# Patient Record
Sex: MALE | Race: WHITE | NOT HISPANIC OR LATINO | Employment: OTHER | URBAN - NONMETROPOLITAN AREA
[De-identification: names, ages, dates, MRNs, and addresses within clinical notes are randomized per-mention and may not be internally consistent; named-entity substitution may affect disease eponyms.]

---

## 2018-11-06 ENCOUNTER — TRANSCRIBE ORDERS (OUTPATIENT)
Dept: ADMINISTRATIVE | Facility: HOSPITAL | Age: 73
End: 2018-11-06

## 2018-11-06 DIAGNOSIS — R94.31 ABNORMAL EKG: Primary | ICD-10-CM

## 2018-11-07 ENCOUNTER — TRANSCRIBE ORDERS (OUTPATIENT)
Dept: ADMINISTRATIVE | Facility: HOSPITAL | Age: 73
End: 2018-11-07

## 2018-11-07 DIAGNOSIS — R94.31 ABNORMAL EKG: Primary | ICD-10-CM

## 2018-11-08 ENCOUNTER — HOSPITAL ENCOUNTER (OUTPATIENT)
Dept: CARDIOLOGY | Facility: HOSPITAL | Age: 73
Discharge: HOME | End: 2018-11-08
Admitting: NURSE PRACTITIONER

## 2018-11-08 ENCOUNTER — APPOINTMENT (OUTPATIENT)
Dept: CARDIOLOGY | Facility: HOSPITAL | Age: 73
End: 2018-11-08

## 2018-11-08 DIAGNOSIS — R94.31 ABNORMAL EKG: ICD-10-CM

## 2018-11-08 PROCEDURE — 93306 TTE W/DOPPLER COMPLETE: CPT | Performed by: INTERNAL MEDICINE

## 2018-11-08 PROCEDURE — 93306 TTE W/DOPPLER COMPLETE: CPT

## 2018-11-11 LAB
BH CV ECHO MEAS - % IVS THICK: 95.5 %
BH CV ECHO MEAS - % LVPW THICK: 95 %
BH CV ECHO MEAS - ACS: 2.6 CM
BH CV ECHO MEAS - AO ROOT AREA (BSA CORRECTED): 1.9
BH CV ECHO MEAS - AO ROOT AREA: 12.3 CM^2
BH CV ECHO MEAS - AO ROOT DIAM: 4 CM
BH CV ECHO MEAS - BSA(HAYCOCK): 2.1 M^2
BH CV ECHO MEAS - BSA: 2.1 M^2
BH CV ECHO MEAS - BZI_BMI: 29.5 KILOGRAMS/M^2
BH CV ECHO MEAS - BZI_METRIC_HEIGHT: 175.3 CM
BH CV ECHO MEAS - BZI_METRIC_WEIGHT: 90.7 KG
BH CV ECHO MEAS - EDV(CUBED): 171.6 ML
BH CV ECHO MEAS - EDV(MOD-SP4): 59 ML
BH CV ECHO MEAS - EDV(TEICH): 150.9 ML
BH CV ECHO MEAS - EF(CUBED): 82.2 %
BH CV ECHO MEAS - EF(MOD-SP4): 83.1 %
BH CV ECHO MEAS - EF(TEICH): 74.4 %
BH CV ECHO MEAS - ESV(CUBED): 30.5 ML
BH CV ECHO MEAS - ESV(MOD-SP4): 10 ML
BH CV ECHO MEAS - ESV(TEICH): 38.7 ML
BH CV ECHO MEAS - FS: 43.8 %
BH CV ECHO MEAS - IVS/LVPW: 1.1
BH CV ECHO MEAS - IVSD: 0.96 CM
BH CV ECHO MEAS - IVSS: 1.9 CM
BH CV ECHO MEAS - LA DIMENSION: 3.7 CM
BH CV ECHO MEAS - LA/AO: 0.94
BH CV ECHO MEAS - LV DIASTOLIC VOL/BSA (35-75): 28.6 ML/M^2
BH CV ECHO MEAS - LV MASS(C)D: 192.3 GRAMS
BH CV ECHO MEAS - LV MASS(C)DI: 93.1 GRAMS/M^2
BH CV ECHO MEAS - LV MASS(C)S: 223.8 GRAMS
BH CV ECHO MEAS - LV MASS(C)SI: 108.3 GRAMS/M^2
BH CV ECHO MEAS - LV SYSTOLIC VOL/BSA (12-30): 4.8 ML/M^2
BH CV ECHO MEAS - LVIDD: 5.6 CM
BH CV ECHO MEAS - LVIDS: 3.1 CM
BH CV ECHO MEAS - LVLD AP4: 8.3 CM
BH CV ECHO MEAS - LVLS AP4: 6.9 CM
BH CV ECHO MEAS - LVOT AREA (M): 3.5 CM^2
BH CV ECHO MEAS - LVOT AREA: 3.3 CM^2
BH CV ECHO MEAS - LVOT DIAM: 2.1 CM
BH CV ECHO MEAS - LVPWD: 0.87 CM
BH CV ECHO MEAS - LVPWS: 1.7 CM
BH CV ECHO MEAS - MV A MAX VEL: 79 CM/SEC
BH CV ECHO MEAS - MV E MAX VEL: 56.3 CM/SEC
BH CV ECHO MEAS - MV E/A: 0.71
BH CV ECHO MEAS - RVDD: 1.6 CM
BH CV ECHO MEAS - SI(CUBED): 68.3 ML/M^2
BH CV ECHO MEAS - SI(MOD-SP4): 23.7 ML/M^2
BH CV ECHO MEAS - SI(TEICH): 54.3 ML/M^2
BH CV ECHO MEAS - SV(CUBED): 141 ML
BH CV ECHO MEAS - SV(MOD-SP4): 49 ML
BH CV ECHO MEAS - SV(TEICH): 112.3 ML
MAXIMAL PREDICTED HEART RATE: 146 BPM
STRESS TARGET HR: 124 BPM

## 2021-11-09 ENCOUNTER — TELEPHONE (OUTPATIENT)
Dept: UROLOGY | Facility: CLINIC | Age: 76
End: 2021-11-09

## 2021-11-18 ENCOUNTER — OFFICE VISIT (OUTPATIENT)
Dept: UROLOGY | Facility: CLINIC | Age: 76
End: 2021-11-18

## 2021-11-18 VITALS — BODY MASS INDEX: 29.1 KG/M2 | HEIGHT: 68 IN | WEIGHT: 192 LBS

## 2021-11-18 DIAGNOSIS — N40.1 BENIGN PROSTATIC HYPERPLASIA WITH WEAK URINARY STREAM: ICD-10-CM

## 2021-11-18 DIAGNOSIS — N40.0 BPH WITHOUT OBSTRUCTION/LOWER URINARY TRACT SYMPTOMS: ICD-10-CM

## 2021-11-18 DIAGNOSIS — N40.0 BPH WITH ELEVATED PSA: Primary | ICD-10-CM

## 2021-11-18 DIAGNOSIS — R39.12 BENIGN PROSTATIC HYPERPLASIA WITH WEAK URINARY STREAM: ICD-10-CM

## 2021-11-18 DIAGNOSIS — R97.20 BPH WITH ELEVATED PSA: Primary | ICD-10-CM

## 2021-11-18 LAB
BILIRUB BLD-MCNC: NEGATIVE MG/DL
CLARITY, POC: CLEAR
COLOR UR: YELLOW
EXPIRATION DATE: NORMAL
GLUCOSE UR STRIP-MCNC: NEGATIVE MG/DL
KETONES UR QL: NEGATIVE
LEUKOCYTE EST, POC: NEGATIVE
Lab: NORMAL
NITRITE UR-MCNC: NEGATIVE MG/ML
PH UR: 6 [PH] (ref 5–8)
PROT UR STRIP-MCNC: NEGATIVE MG/DL
RBC # UR STRIP: NEGATIVE /UL
SP GR UR: 1.02 (ref 1–1.03)
UROBILINOGEN UR QL: NORMAL

## 2021-11-18 PROCEDURE — 99204 OFFICE O/P NEW MOD 45 MIN: CPT | Performed by: NURSE PRACTITIONER

## 2021-11-18 PROCEDURE — 36415 COLL VENOUS BLD VENIPUNCTURE: CPT | Performed by: NURSE PRACTITIONER

## 2021-11-18 PROCEDURE — 81003 URINALYSIS AUTO W/O SCOPE: CPT | Performed by: NURSE PRACTITIONER

## 2021-11-18 PROCEDURE — 51798 US URINE CAPACITY MEASURE: CPT | Performed by: NURSE PRACTITIONER

## 2021-11-18 RX ORDER — PRAVASTATIN SODIUM 20 MG
1 TABLET ORAL DAILY
COMMUNITY
Start: 2021-10-13

## 2021-11-18 RX ORDER — LOSARTAN POTASSIUM 100 MG/1
1 TABLET ORAL DAILY
COMMUNITY
Start: 2021-10-13

## 2021-11-18 RX ORDER — DILTIAZEM HYDROCHLORIDE 180 MG/1
1 CAPSULE, COATED, EXTENDED RELEASE ORAL DAILY
COMMUNITY
Start: 2021-10-13

## 2021-11-18 NOTE — PATIENT INSTRUCTIONS
Prostate Cancer Screening    Prostate cancer screening is a test that is done to check for the presence of prostate cancer in men. The prostate gland is a walnut-sized gland that is located below the bladder and in front of the rectum in males. The function of the prostate is to add fluid to semen during ejaculation. Prostate cancer is the second most common type of cancer in men.  Who should have prostate cancer screening?   Screening recommendations vary based on age and other risk factors.  Screening is recommended if:  · You are older than age 55. If you are age 55-69, talk with your health care provider about your need for screening and how often screening should be done. Because most prostate cancers are slow growing and will not cause death, screening is generally reserved in this age group for men who have a 10-15-year life expectancy.  · You are younger than age 55, and you have these risk factors:  ? Being a black male or a male of  descent.  ? Having a father, brother, or uncle who has been diagnosed with prostate cancer. The risk is higher if your family member's cancer occurred at an early age.  Screening is not recommended if:  · You are younger than age 40.  · You are between the ages of 40 and 54 and you have no risk factors.  · You are 70 years of age or older. At this age, the risks that screening can cause are greater than the benefits that it may provide.  If you are at high risk for prostate cancer, your health care provider may recommend that you have screenings more often or that you start screening at a younger age.  How is screening for prostate cancer done?  The recommended prostate cancer screening test is a blood test called the prostate-specific antigen (PSA) test. PSA is a protein that is made in the prostate. As you age, your prostate naturally produces more PSA. Abnormally high PSA levels may be caused by:  · Prostate cancer.  · An enlarged prostate that is not caused by cancer  (benign prostatic hyperplasia, BPH). This condition is very common in older men.  · A prostate gland infection (prostatitis).  Depending on the PSA results, you may need more tests, such as:  · A physical exam to check the size of your prostate gland.  · Blood and imaging tests.  · A procedure to remove tissue samples from your prostate gland for testing (biopsy).  What are the benefits of prostate cancer screening?  · Screening can help to identify cancer at an early stage, before symptoms start and when the cancer can be treated more easily.  · There is a small chance that screening may lower your risk of dying from prostate cancer. The chance is small because prostate cancer is a slow-growing cancer, and most men with prostate cancer die from a different cause.  What are the risks of prostate cancer screening?  The main risk of prostate cancer screening is diagnosing and treating prostate cancer that would never have caused any symptoms or problems. This is called overdiagnosisand overtreatment. PSA screening cannot tell you if your PSA is high due to cancer or a different cause. A prostate biopsy is the only procedure to diagnose prostate cancer. Even the results of a biopsy may not tell you if your cancer needs to be treated. Slow-growing prostate cancer may not need any treatment other than monitoring, so diagnosing and treating it may cause unnecessary stress or other side effects.  A prostate biopsy may also cause:  · Infection or fever.  · A false negative. This is a result that shows that you do not have prostate cancer when you actually do have prostate cancer.  Questions to ask your health care provider  · When should I start prostate cancer screening?  · What is my risk for prostate cancer?  · How often do I need screening?  · What type of screening tests do I need?  · How do I get my test results?  · What do my results mean?  · Do I need treatment?  Where to find more information  · The American Cancer  Society: www.cancer.org  · American Urological Association: www.auanet.org  Contact a health care provider if:  · You have difficulty urinating.  · You have pain when you urinate or ejaculate.  · You have blood in your urine or semen.  · You have pain in your back or in the area of your prostate.  Summary  · Prostate cancer is a common type of cancer in men. The prostate gland is located below the bladder and in front of the rectum. This gland adds fluid to semen during ejaculation.  · Prostate cancer screening may identify cancer at an early stage, when the cancer can be treated more easily.  · The prostate-specific antigen (PSA) test is the recommended screening test for prostate cancer.  · Discuss the risks and benefits of prostate cancer screening with your health care provider. If you are age 70 or older, the risks that screening can cause are greater than the benefits that it may provide.  This information is not intended to replace advice given to you by your health care provider. Make sure you discuss any questions you have with your health care provider.  Document Revised: 04/09/2021 Document Reviewed: 07/30/2020  [employer] Patient Education © 2021 [employer]  Prostate Cancer    The prostate is a male gland that helps make semen. It is located below a man's bladder, in front of the rectum. Prostate cancer is when abnormal cells grow in this gland.  What are the causes?  The cause of this condition is not known.  What increases the risk?  You are more likely to develop this condition if:  · You are 65 years of age or older.  · You are .  · You have a family history of prostate cancer.  · You have a family history of breast cancer.  What are the signs or symptoms?  Symptoms of this condition include:  · A need to pee often.  · Peeing that is weak, or pee that stops and starts.  · Trouble starting or stopping your pee.  · Inability to pee.  · Blood in your pee or semen.  · Pain in the lower  back, lower belly (abdomen), hips, or upper thighs.  · Trouble getting an erection.  · Trouble emptying all of your pee.  How is this treated?  Treatment for this condition depends on your age, your health, the kind of treatment you like, and how far the cancer has spread. Treatments include:  · Being watched. This is called observation. You will be tested from time to time, but you will not get treated. Tests are to make sure that the cancer is not growing.  · Surgery. This may be done to remove the prostate, to remove the testicles, or to freeze or kill cancer cells.  · Radiation. This uses a strong beam to kill cancer cells.  · Ultrasound energy. This uses strong sound waves to kill cancer cells.  · Chemotherapy. This uses medicines that stop cancer cells from increasing. This kills cancer cells and healthy cells.  · Targeted therapy. This kills cancer cells only. Healthy cells are not affected.  · Hormone treatment. This stops the body from making hormones that help the cancer cells to grow.  Follow these instructions at home:  · Take over-the-counter and prescription medicines only as told by your doctor.  · Eat a healthy diet.  · Get plenty of sleep.  · Ask your doctor for help to find a support group for men with prostate cancer.  · If you have to go to the hospital, let your cancer doctor (oncologist) know.  · Treatment may affect your ability to have sex. Touch, hold, hug, and caress your partner to have intimate moments.  · Keep all follow-up visits as told by your doctor. This is important.  Contact a doctor if:  · You have new or more trouble peeing.  · You have new or more blood in your pee.  · You have new or more pain in your hips, back, or chest.  Get help right away if:  · You have weakness in your legs.  · You lose feeling in your legs.  · You cannot control your pee or your poop (stool).  · You have chills or a fever.  Summary  · The prostate is a male gland that helps make semen.  · Prostate  cancer is when abnormal cells grow in this gland.  · Treatment includes doing surgery, using medicines, using very strong beams, or watching without treatment.  · Ask your doctor for help to find a support group for men with prostate cancer.  · Contact a doctor if you have problems peeing or have any new pain that you did not have before.  This information is not intended to replace advice given to you by your health care provider. Make sure you discuss any questions you have with your health care provider.  Document Revised: 12/01/2020 Document Reviewed: 12/01/2020  [employer] Patient Education © 2021 [employer]

## 2021-11-18 NOTE — PROGRESS NOTES
"Chief Complaint  Elevated PSA (New Patient )    Subjective          Crow Zeng presents to [facility]  History of Present Illness    Mr. Crow Zeng is a very pleasant 76year-old male who presents  to clinic today for evaluation. He has been referred to us by his PCP for an Elevated PSA of 5.1. He is unsure of his PSA prior to this, but reports an increasing trend over the last few years. He has a significant family history of Prostate Cancer-His Brother, diagnosed over 20 years ago- still alive in his 80s     He is glad to be doing better at this age, Patient denies any issues with BPH recently. Besides occasional Nocturia 1-2 times, a weak urinary stream, He denies  having any urinary symptoms of frequency, urgency or dysuria, He does not have Recurrent UTIs, he denies any burning on urination, Urinary hesitancy or post void dribbling. He apparently is on No medication for his prostate.    He is also here for his yearly prostate exam, states his hast PACO was over 30 years ago and was unremarkable. His DREtoday is negative for nodules, He has good rectal tone, AN Enlarged, smooth BUT VERY FIRMHARD AND SUSPICIOUS prostate. There is no nodularity or any suspicious rectal abnormalities.  Denies pelvic pressure or supra pubic discomfort. Denies abdominal, back or flank pain. No N/V/D. Denies chills or fevers. His urine dipstick today is completely negative for any infection, it is negative for gross/microscopic hematuria. His IPSS score is 6, his PVR>24CC    Objective   Vital Signs:   Ht 172.7 cm (68\")   Wt 87.1 kg (192 lb)   BMI 29.19 kg/m²     Physical Exam  Constitutional:       General: He is in acute distress.      Appearance: He is well-developed. He is obese.   HENT:      Head: Normocephalic and atraumatic.      Right Ear: External ear normal.      Left Ear: External ear normal.   Eyes:      General:         Right eye: No discharge.         Left eye: No " discharge.      Conjunctiva/sclera: Conjunctivae normal.      Pupils: Pupils are equal, round, and reactive to light.   Neck:      Thyroid: No thyromegaly.      Trachea: No tracheal deviation.   Cardiovascular:      Rate and Rhythm: Normal rate and regular rhythm.      Heart sounds: No murmur heard.  No friction rub.   Pulmonary:      Effort: Pulmonary effort is normal. No respiratory distress.      Breath sounds: Normal breath sounds. No stridor.   Abdominal:      General: Bowel sounds are normal. There is distension.      Palpations: Abdomen is soft.      Tenderness: There is abdominal tenderness. There is no guarding.   Genitourinary:     Penis: Normal and uncircumcised. No tenderness or discharge.       Testes: Normal.      Rectum: Normal. Guaiac result negative.      Comments: His PACO is negative for nodules, He has good rectal tone, AN Enlarged,  smooth BUT VERY firm/HARD AND SUSPICIOUS prostate. There is no nodularity or any suspicious rectal abnormalities.    Musculoskeletal:         General: Tenderness present. No deformity. Normal range of motion.      Cervical back: Normal range of motion and neck supple.      Comments: ESSENTIAL TREMMORS     Skin:     General: Skin is warm and dry.      Capillary Refill: Capillary refill takes less than 2 seconds.      Coloration: Skin is not pale.   Neurological:      Mental Status: He is alert and oriented to person, place, and time.      Cranial Nerves: No cranial nerve deficit.      Coordination: Coordination normal.   Psychiatric:         Behavior: Behavior normal.         Thought Content: Thought content normal.         Judgment: Judgment normal.     IPSS Questionnaire (AUA-7):  Over the past month…    1)  How often have you had a sensation of not emptying your bladder completely after you finish urinating?  0 - Not at all   2)  How often have you had to urinate again less than two hours after you finished urinating? 1 - Less than 1 time in 5   3)  How often have  you found you stopped and started again several times when you urinated?  0 - Not at all   4) How difficult have you found it to postpone urination?  0 - Not at all   5) How often have you had a weak urinary stream?  3 - About half the time   6) How often have you had to push or strain to begin urination?  0 - Not at all   7) How many times did you most typically get up to urinate from the time you went to bed until the time you got up in the morning?  2 - 2 times   Total score:  0-7 mildly symptomatic                                                          6    8-19 moderately symptomatic    20-35 severely symptomatic        Result Review :e Hesitancy or Post void Dribbling.           UA    Urinalysis 11/18/21   Ketones, UA Negative   Leukocytes, UA Negative               PSA    PSA 11/18/21   PSA 3.7      Comments are available for some flowsheets but are not being displayed.                     Assessment and Plan    Diagnoses and all orders for this visit:    1. BPH with elevated PSA (Primary)  -     PSA, Total & Free  -     POC Urinalysis Dipstick, Automated    2. BPH without obstruction/lower urinary tract symptoms  -     PSA, Total & Free    Other orders  -     Bladder Scan    BPH: WE Discussed the pathophysiology of BPH and obstruction. We discussed the static and dynamic effects of BPH as well as using 5 alpha reductase inhibitors versus alpha blockade.  We discussed the indications for transurethral surgery as well.  And/ or other therapeutic options available including all of the newer techniques. He has  real symptomatology referable to his prostate other than moderate enlargement.  Recommending we proceed with extensive workup if his repeat PSA/free and total percentages become concerning(FREE PSA<10).  I am also recommending an alpha blocker tamsulosin 0.4 mg capsule daily FOR his weak urinary strength.      Elevated PSA :Pt has been referred to us with a PSA of 5.1, He has a fairly enlarged  enlarged/firm/hard prostate with only minor urinary symptoms of nocturia 3-4 times depending on his fluid intake or how late he consumes any beverage prior to bedtime.He has a significant family history of Prostate Cancer-His Brother, diagnosed over 20 years ago- still alive    We discussed the diagnosis of Elevated Prostate-Specific antigen (PSA).  I explained the pathophysiology of PSA.  It is a serine protease. It's  function in the male reproductive tract is to facilitate the liquefaction of semen.  It is for this reason the body does not want it freely floating in the serum and why it is typically bound tightly to albumin.     We discussed why we most typically will use both a PSA free and Total to determine the need for more aggressive therapy. I discussed the normal range, and how it varies with age groups and descent.  Additionally, I explained to him typically PSA was in the range of 1-4 but more recently has been downgraded to something less than 2 or even approaching 1.      I discussed the risk of family history particularly the fact that the average male has a 14% risk of prostate cancer and that in the face of a positive diagnosis in a father it will tablet and any other first-generation relative continued tablet insofar that a father and brother with prostate cancer will produce almost a 50% risk of prostate cancer.  I discussed the use of the temporal use of PSA as the best option for monitoring.  We also discussed the fact that an elevated PSA is an isolated event does not mean that this is prostate cancer and should not engender worry in this regard.     FINALLY, I also discussed other things that can elevate PSA including constipation, prostatitis, infection, recent intercourse etc, as well as the risks and benefits associated with this.  Also discussed the fact that this is a dilutional test and as a consequence of such, will occasionally produce slight variations on a single specimen.                                              PLAN  Further discussed the risks and benefits of a prostate biopsy as well if indicated.      His PSA was repeated today: Free and total PSA pending to complete the work-up    I will call patient with results and definitive plan of care.    DiscussedSTARTING Flomax 0.4 mg as prescribed for BPH.    Patient/wife agreeable plan of care.      Follow Up   Return in about 6 months (around 5/18/2022) for Next scheduled follow up repeat PSA, Next scheduled follow up.  Patient was given instructions and counseling regarding his condition or for health maintenance advice. Please see specific information pulled into the AVS if appropriate.

## 2021-11-18 NOTE — PROGRESS NOTES
"Chief Complaint  Elevated PSA (New Patient )    Subjective          Crow Zeng presents to [facility]  History of Present Illness    Objective   Vital Signs:   Ht 172.7 cm (68\")   Wt 87.1 kg (192 lb)   BMI 29.19 kg/m²     Physical Exam   Result Review :                 Assessment and Plan    Diagnoses and all orders for this visit:    1. BPH with elevated PSA (Primary)  -     PSA, Total & Free    2. BPH without obstruction/lower urinary tract symptoms  -     PSA, Total & Free        Follow Up   No follow-ups on file.  Patient was given instructions and counseling regarding his condition or for health maintenance advice. Please see specific information pulled into the AVS if appropriate.       "

## 2021-11-19 ENCOUNTER — TELEPHONE (OUTPATIENT)
Dept: UROLOGY | Facility: CLINIC | Age: 76
End: 2021-11-19

## 2021-11-19 LAB
PSA FREE MFR SERPL: 22.2 %
PSA FREE SERPL-MCNC: 0.82 NG/ML
PSA SERPL-MCNC: 3.7 NG/ML (ref 0–4)

## 2021-11-30 RX ORDER — TAMSULOSIN HYDROCHLORIDE 0.4 MG/1
1 CAPSULE ORAL DAILY
Qty: 30 CAPSULE | Refills: 5 | Status: SHIPPED | OUTPATIENT
Start: 2021-11-30

## 2022-05-18 ENCOUNTER — OFFICE VISIT (OUTPATIENT)
Dept: UROLOGY | Facility: CLINIC | Age: 77
End: 2022-05-18

## 2022-05-18 VITALS — BODY MASS INDEX: 29.1 KG/M2 | WEIGHT: 192 LBS | HEIGHT: 68 IN

## 2022-05-18 DIAGNOSIS — R33.9 INCOMPLETE EMPTYING OF BLADDER: ICD-10-CM

## 2022-05-18 DIAGNOSIS — N40.1 BENIGN PROSTATIC HYPERPLASIA WITH WEAK URINARY STREAM: ICD-10-CM

## 2022-05-18 DIAGNOSIS — R39.12 BENIGN PROSTATIC HYPERPLASIA WITH WEAK URINARY STREAM: ICD-10-CM

## 2022-05-18 DIAGNOSIS — R97.20 BPH WITH ELEVATED PSA: Primary | ICD-10-CM

## 2022-05-18 DIAGNOSIS — N40.0 BPH WITH ELEVATED PSA: Primary | ICD-10-CM

## 2022-05-18 DIAGNOSIS — N40.0 BPH WITHOUT OBSTRUCTION/LOWER URINARY TRACT SYMPTOMS: ICD-10-CM

## 2022-05-18 PROCEDURE — 84153 ASSAY OF PSA TOTAL: CPT | Performed by: NURSE PRACTITIONER

## 2022-05-18 PROCEDURE — 84154 ASSAY OF PSA FREE: CPT | Performed by: NURSE PRACTITIONER

## 2022-05-18 PROCEDURE — 99214 OFFICE O/P EST MOD 30 MIN: CPT | Performed by: NURSE PRACTITIONER

## 2022-05-18 NOTE — PROGRESS NOTES
"Chief Complaint  Benign Prostatic Hypertrophy (6 MONTH FOLLOW UP FOR BPH/WEAK URINE STREAM/RECHECK PSA)    Subjective          Crow Zeng presents to [facility] for   History of Present Illness     Mr. Crow Zeng is a very pleasant 76-year-old male who returns to clinic today 05/18/2020, for his 6 months follow-up on elevated PSA.  Patient's most  recent PSA IS 3.7, with a free PSA of 22.2% done ON 11/18/2021.  Patient is here for PSA recheck.  He is in no apparent discomfort and reports doing relatively well.    He HAD been referred to us by his PCP for an Elevated PSA of 5.1. He was unsure of his PSA prior to this, but reports an increasing trend over the last few years. He has a significant family history of Prostate Cancer-His Brother, diagnosed over 20 years ago- still alive in his 80s     TODAY, He is glad to be doing better at this age, Patient denies any issues with BPH recently. Besides occasional Nocturia 1-2 times, a weak urinary stream, He denies  having any urinary symptoms of frequency, urgency or dysuria, He does not have Recurrent UTIs, he denies any burning on urination, Urinary hesitancy or post void dribbling. He apparently is on No medication for his prostate.     The patient was started on Flomax 6 months ago but reports he has been unable to tolerate medications due to dizzy spells.  He Denies pelvic pressure or supra pubic discomfort. Denies abdominal, back or flank pain. No N/V/D. Denies chills or fevers. His urine dipstick today is completely negative for any infection, it is negative for gross/microscopic hematuria. His IPSS score is 5, his PVR> 64CC     Objective   Vital Signs:   Ht 172.7 cm (67.99\")   Wt 87.1 kg (192 lb)   BMI 29.20 kg/m²     Physical Exam  Constitutional:       General: He is in acute distress.      Appearance: He is well-developed. He is obese.   HENT:      Head: Normocephalic and atraumatic.      Right Ear: External " ear normal.      Left Ear: External ear normal.   Eyes:      General:         Right eye: No discharge.         Left eye: No discharge.      Conjunctiva/sclera: Conjunctivae normal.      Pupils: Pupils are equal, round, and reactive to light.   Neck:      Thyroid: No thyromegaly.      Trachea: No tracheal deviation.   Cardiovascular:      Rate and Rhythm: Normal rate and regular rhythm.      Heart sounds: No murmur heard.    No friction rub.   Pulmonary:      Effort: Pulmonary effort is normal. No respiratory distress.      Breath sounds: Normal breath sounds. No stridor.   Abdominal:      General: Bowel sounds are normal. There is distension.      Palpations: Abdomen is soft.      Tenderness: There is abdominal tenderness. There is no guarding.   Genitourinary:     Penis: Normal and uncircumcised. No tenderness or discharge.       Testes: Normal.      Rectum: Normal. Guaiac result negative.   Musculoskeletal:         General: Tenderness present. No deformity. Normal range of motion.      Cervical back: Normal range of motion and neck supple.   Skin:     General: Skin is warm and dry.      Capillary Refill: Capillary refill takes less than 2 seconds.      Coloration: Skin is pale.   Neurological:      General: No focal deficit present.      Mental Status: He is alert and oriented to person, place, and time.      Cranial Nerves: No cranial nerve deficit.      Motor: Weakness present.      Coordination: Coordination normal.   Psychiatric:         Behavior: Behavior normal.         Thought Content: Thought content normal.         Judgment: Judgment normal.      IPSS Questionnaire (AUA-7):  Over the past month…    1)  How often have you had a sensation of not emptying your bladder completely after you finish urinating?  1 - Less than 1 time in 5   2)  How often have you had to urinate again less than two hours after you finished urinating? 1 - Less than 1 time in 5   3)  How often have you found you stopped and started  again several times when you urinated?  1 - Less than 1 time in 5   4) How difficult have you found it to postpone urination?  0 - Not at all   5) How often have you had a weak urinary stream?  2 - Less than half the time   6) How often have you had to push or strain to begin urination?  0 - Not at all   7) How many times did you most typically get up to urinate from the time you went to bed until the time you got up in the morning?  0 - None   Total score:  0-7 mildly symptomatic                                                    5    8-19 moderately symptomatic    20-35 severely symptomatic       Result Review :     UA    Urinalysis 11/18/21   Ketones, UA Negative   Leukocytes, UA Negative               PSA    PSA 11/18/21   PSA 3.7      Comments are available for some flowsheets but are not being displayed.                     Assessment and Plan    Problem List Items Addressed This Visit    None     Visit Diagnoses     BPH with elevated PSA    -  Primary    Relevant Orders    PSA Total+% Free (Serial)    Benign prostatic hyperplasia with weak urinary stream        Relevant Orders    PSA Total+% Free (Serial)    BPH without obstruction/lower urinary tract symptoms        Relevant Orders    PSA Total+% Free (Serial)    Incomplete emptying of bladder        Relevant Orders    Bladder Scan (Completed)           ASSESSMMENT       BPH WITH ELEVATED PSA/WEAK URINARY STREAM/LUTS  Mr. Crow Zeng is a very pleasant 76-year-old male who returns to clinic today 05/18/2020, for his 6 months follow-up on elevated PSA.  Patient's most  recent PSA IS 3.7, with a free PSA of 22.2% done ON 11/18/2021.  Patient is here for PSA recheck.  He is in no apparent discomfort and reports doing relatively well.  His PSA on initial evaluation was 5.1, his urine dipstick is negative, his IPSS score is 5    BPH: Again, WE Discussed the pathophysiology of BPH and obstruction.   We discussed the static and dynamic effects of BPH as well as  using 5 alpha reductase inhibitors versus alpha blockade.  We discussed the indications for transurethral surgery as well.  And/ or other therapeutic options available including all of the newer techniques. He has NO  real symptomatology referable to his prostate other than moderate enlargement.    Rather than proceed  with any extensive workup at this time, I am recommending we monitor his PSAs as indicated.  Reports he is unable to tolerate Flomax.    Elevated PSA :Pt HAD been referred to us with a PSA of 5.1, his repeat PSA was 3.7.  He has a fairly enlarged enlarged/firm/hard prostate with only minor urinary symptoms of nocturia depending on his fluid intake or how late he consumes any beverage prior to bedtime.He has a significant family history of Prostate Cancer-His Brother, diagnosed over 20 years ago- still alive.      We discussed the diagnosis of Elevated Prostate-Specific antigen (PSA).  I explained the pathophysiology of PSA.  It is a serine protease. It's  function in the male reproductive tract is to facilitate the liquefaction of semen.  It is for this reason the body does not want it freely floating in the serum and why it is typically bound tightly to albumin.      Again, we discussed why we most typically will use both a PSA free and Total to determine the need for more aggressive therapy. I discussed the normal range, and how it varies with age groups and descent.  Additionally, I explained to him typically PSA was in the range of 1-4 but more recently has been downgraded to something less than 2 or even approaching 1.       I discussed the risk of family history particularly the fact that the average male has a 14% risk of prostate cancer and that in the face of a positive diagnosis in a father it will tablet and any other first-generation relative continued tablet insofar that a father and brother with prostate cancer will produce almost a 50% risk of prostate cancer.  I discussed the use of  the temporal use of PSA as the best option for monitoring.  We also discussed the fact that an elevated PSA is an isolated event does not mean that this is prostate cancer and should not engender worry in this regard.      FINALLY, I also discussed other things that can elevate PSA including constipation, prostatitis, infection, recent intercourse etc, as well as the risks and benefits associated with this.  Also discussed the fact that this is a dilutional test and as a consequence of such, will occasionally produce slight variations on a single specimen.                                              PLAN  Further discussed the risks and benefits of a prostate biopsy as well if indicated, LAST PSA RECHECK WAS 3.7 WITH 22.2 %FREE PSA     His PSA was repeated today: Free and total PSA p    I will call patient with results and definitive plan of care.     Discussed STOPPING Flomax 0.4 mg as prescribed for BPH(patient reports unable to tolerate medication due to dizziness on numerous occasions.  He tried multiple times and failed)    He will follow-up in 1 year, may return sooner if need be     Patient  agreeable plan of care.     Patient reports that he is not currently experiencing any symptoms of urinary incontinence.    BMI is >= 25 and < 30. (Overweight) The following options were offered after discussion: weight loss educational material (shared in after visit summary), exercise counseling/recommendations and nutrition counseling/recommendations    Smoking Cessation Counseling:  Never a smoker.  Patient does not currently use any tobacco products.     Follow Up   Return in about 1 year (around 5/18/2023) for BPH WITH WEAK URINARY STREAM/CHECK PSA/PVR.  Patient was given instructions and counseling regarding his condition or for health maintenance advice. Please see specific information pulled into the AVS if appropriate.

## 2022-05-19 LAB
PSA FREE MFR SERPL: 17.1 %
PSA FREE SERPL-MCNC: 0.84 NG/ML
PSA SERPL-MCNC: 4.9 NG/ML (ref 0–4)

## 2022-05-23 ENCOUNTER — TELEPHONE (OUTPATIENT)
Dept: UROLOGY | Facility: CLINIC | Age: 77
End: 2022-05-23

## 2022-05-23 NOTE — TELEPHONE ENCOUNTER
I called the pt for Vicente to let him know his PSA was slightly elevated at 4.3 compared to last level of 3.7. I told him we would monitor it and make sure it is ok. Left a voicemail with all this because no answer.

## 2023-01-18 ENCOUNTER — OFFICE VISIT (OUTPATIENT)
Dept: UROLOGY | Facility: CLINIC | Age: 78
End: 2023-01-18
Payer: MEDICARE

## 2023-01-18 VITALS
BODY MASS INDEX: 29.55 KG/M2 | HEIGHT: 68 IN | HEART RATE: 67 BPM | DIASTOLIC BLOOD PRESSURE: 110 MMHG | SYSTOLIC BLOOD PRESSURE: 183 MMHG | WEIGHT: 195 LBS

## 2023-01-18 DIAGNOSIS — N40.0 BPH WITHOUT OBSTRUCTION/LOWER URINARY TRACT SYMPTOMS: ICD-10-CM

## 2023-01-18 DIAGNOSIS — R97.20 BPH WITH ELEVATED PSA: Primary | ICD-10-CM

## 2023-01-18 DIAGNOSIS — N40.0 BPH WITH ELEVATED PSA: Primary | ICD-10-CM

## 2023-01-18 DIAGNOSIS — Z80.42 FAMILY HISTORY OF PROSTATE CANCER: ICD-10-CM

## 2023-01-18 LAB
BILIRUB BLD-MCNC: NEGATIVE MG/DL
CLARITY, POC: CLEAR
COLOR UR: YELLOW
EXPIRATION DATE: NORMAL
GLUCOSE UR STRIP-MCNC: NEGATIVE MG/DL
KETONES UR QL: NEGATIVE
LEUKOCYTE EST, POC: NEGATIVE
Lab: NORMAL
NITRITE UR-MCNC: NEGATIVE MG/ML
PH UR: 6.5 [PH] (ref 5–8)
PROT UR STRIP-MCNC: NEGATIVE MG/DL
RBC # UR STRIP: NEGATIVE /UL
SP GR UR: 1.01 (ref 1–1.03)
UROBILINOGEN UR QL: NORMAL

## 2023-01-18 PROCEDURE — 51798 US URINE CAPACITY MEASURE: CPT | Performed by: NURSE PRACTITIONER

## 2023-01-18 PROCEDURE — 81003 URINALYSIS AUTO W/O SCOPE: CPT | Performed by: NURSE PRACTITIONER

## 2023-01-18 PROCEDURE — 99214 OFFICE O/P EST MOD 30 MIN: CPT | Performed by: NURSE PRACTITIONER

## 2023-01-18 RX ORDER — PREDNISONE 2.5 MG
TABLET ORAL
COMMUNITY
Start: 2023-01-08

## 2023-01-18 NOTE — PROGRESS NOTES
Chief Complaint  Benign Prostatic Hypertrophy (6 MONTH Follow up ON BPH WITH ELEVATED PSA) and Elevated PSA    Subjective          Crow Zeng presents to [facility] for BPH WITH ELEVATED PSA  History of Present Illness     Mr. Crow Zeng is a very pleasant 77-year-old male who returns to clinic today 01/19/22 for evaluation.  This is his his 6 months follow-up on elevated PSA.  Patient's most  recent PSA IS 4.9, done 8 months ago on 05/18/2022.  His PSA prior was 3.7, with a free PSA of 22.2% done ON 11/18/2021. Patient is here for PSA recheck. He is in no apparent discomfort and reports doing relatively well.     He HAD  initially been referred to us by his PCP for an Elevated PSA of 5.1. He was unsure of his PSA prior to this, but reports an increasing trend over the last few years. He has a significant family history of Prostate Cancer-His Brother, diagnosed over 20 years ago- still alive in his 80s     TODAY, He is glad to be doing better at this age, Patient denies any issues with BPH recently. Besides occasional Nocturia 1-2 times, a weak urinary stream, He denies  having any urinary symptoms of frequency, urgency or dysuria, He does not have Recurrent UTIs, he denies any burning on urination, Urinary hesitancy or post void dribbling. He apparently is on No medication for his prostate.     The patient was started on Flomax 6 months ago but reports he has been unable to tolerate medications due to dizzy spells.  He Denies pelvic pressure or supra pubic discomfort. Denies abdominal, back or flank pain. No N/V/D. Denies chills or fevers. His urine dipstick today is completely negative for any infection, it is negative for gross/microscopic hematuria. His IPSS score is 10, his PVR> 32    We also discussed his systolic blood pressure 183 and 108 today patient will continue blood pressure monitoring and follow-up with PCP    We also discussed getting a prostate MRI  "due to his rising PSA    Patient is agreeable plan of care     Objective   Vital Signs:   BP (!) 183/110 (BP Location: Right arm, Patient Position: Sitting, Cuff Size: Adult)   Pulse 67   Ht 172.7 cm (68\")   Wt 88.5 kg (195 lb)   BMI 29.65 kg/m²       ROS:   Review of Systems   Constitutional: Negative for activity change, appetite change, diaphoresis, fatigue, fever, unexpected weight gain and unexpected weight loss.   HENT: Negative for congestion, ear discharge, ear pain, nosebleeds, rhinorrhea, sinus pressure and sore throat.    Eyes: Negative for blurred vision, double vision, photophobia, pain, redness and visual disturbance.   Respiratory: Negative for apnea, cough, chest tightness, shortness of breath, wheezing and stridor.    Cardiovascular: Negative for chest pain and palpitations.   Gastrointestinal: Negative for abdominal distention, abdominal pain, constipation, diarrhea, nausea and vomiting.   Endocrine: Negative for polydipsia, polyphagia and polyuria.   Genitourinary: Negative for decreased urine volume, difficulty urinating, dysuria, flank pain, frequency, hematuria, urgency and urinary incontinence.   Musculoskeletal: Negative for arthralgias and joint swelling.   Skin: Negative for pallor, rash and wound.   Neurological: Negative for dizziness, tremors, syncope, weakness, light-headedness, memory problem and confusion.   Hematological: Bruises/bleeds easily.   Psychiatric/Behavioral: Negative for behavioral problems.        Physical Exam  Constitutional:       General: He is in acute distress.      Appearance: He is well-developed. He is obese.   HENT:      Head: Normocephalic and atraumatic.      Right Ear: External ear normal.      Left Ear: External ear normal.   Eyes:      General:         Right eye: No discharge.         Left eye: No discharge.      Conjunctiva/sclera: Conjunctivae normal.      Pupils: Pupils are equal, round, and reactive to light.   Neck:      Thyroid: No thyromegaly. "      Trachea: No tracheal deviation.   Cardiovascular:      Rate and Rhythm: Normal rate and regular rhythm.      Heart sounds: No murmur heard.    No friction rub.   Pulmonary:      Effort: Pulmonary effort is normal. No respiratory distress.      Breath sounds: Normal breath sounds. No stridor.   Abdominal:      General: Bowel sounds are normal. There is distension.      Palpations: Abdomen is soft.      Tenderness: There is abdominal tenderness. There is no guarding.   Genitourinary:     Penis: Normal and uncircumcised. No tenderness or discharge.       Testes: Normal.      Rectum: Normal. Guaiac result negative.      Comments: PACO DEFERRED  Musculoskeletal:         General: Tenderness present. No deformity. Normal range of motion.      Cervical back: Normal range of motion and neck supple.   Skin:     General: Skin is warm and dry.      Capillary Refill: Capillary refill takes less than 2 seconds.      Coloration: Skin is not pale.      Findings: Bruising, erythema and rash present.   Neurological:      Mental Status: He is alert and oriented to person, place, and time.      Cranial Nerves: No cranial nerve deficit.      Coordination: Coordination normal.   Psychiatric:         Behavior: Behavior normal.         Thought Content: Thought content normal.         Judgment: Judgment normal.        Result Review :     UA    Urinalysis 1/18/23   Ketones, UA Negative   Leukocytes, UA Negative               PSA    PSA 5/18/22 1/18/23   PSA 4.9 (A) 5.5 (A)   (A) Abnormal value       Comments are available for some flowsheets but are not being displayed.                Assessment and Plan    Problem List Items Addressed This Visit    None  Visit Diagnoses     BPH with elevated PSA    -  Primary    Relevant Orders    POC Urinalysis Dipstick, Automated (Completed)    PSA Total+% Free (Serial) (Completed)    MRI Prostate With & Without Contrast    BPH without obstruction/lower urinary tract symptoms        Relevant Orders     PSA Total+% Free (Serial) (Completed)    MRI Prostate With & Without Contrast    Family history of prostate cancer        Relevant Orders    PSA Total+% Free (Serial) (Completed)    MRI Prostate With & Without Contrast                                            ASSESSMMENT       BPH WITH ELEVATED PSA/WEAK URINARY STREAM/LUTS  Mr. Crow Zeng is a very pleasant 77-year-old male who returns to clinic today 01/18/23, EARLIER than anticipated  for his 12months follow-up on BPH elevated PSA.  Patient's most  recent PSA is 4.9, with a free PSA of 17%, done on 05/18/22. HIS PSA prior was  3.7, with a free PSA of 22.2%, done on 11/18/2021 .  Patient is here for PSA recheck.  He is in no apparent discomfort and reports doing relatively well.  His PSA on initial evaluation was 5.1, his urine dipstick today is negative, his IPSS score is 10, PVR is 32CC.  Patient reports a family history of prostate cancer-his 2 brothers diagnosed in the 60s now age 80 and 83.     BPH: Again, WE Discussed the pathophysiology of BPH and obstruction.   We discussed the static and dynamic effects of BPH as well as using 5 alpha reductase inhibitors versus alpha blockade.  We discussed the indications for transurethral surgery as well.  And/ or other therapeutic options available including all of the newer techniques. He has NO  real symptomatology referable to his prostate other than moderate enlargement.   He denies any irritative LUTS symptoms. Rather than proceed  with any extensive workup at this time, I am recommending we monitor his PSAs as indicated.  Reports he is unable to tolerate Flomax.     Elevated PSA :Pt HAD been referred to us with a PSA of 5.1, his repeat PSA was 3.7, then 4.9.  He has a fairly enlarged enlarged/firm/hard prostate with only minor urinary symptoms of nocturia depending on his fluid intake or how late he consumes any beverage prior to bedtime.He has a significant family history of Prostate Cancer-His Brother,  diagnosed over 20 years ago- still alive.      We discussed the diagnosis of Elevated Prostate-Specific antigen (PSA).  I explained the pathophysiology of PSA.  It is a serine protease. It's  function in the male reproductive tract is to facilitate the liquefaction of semen.  It is for this reason the body does not want it freely floating in the serum and why it is typically bound tightly to albumin.      Again, we discussed why we most typically will use both a PSA free and Total to determine the need for more aggressive therapy. I discussed the normal range, and how it varies with age groups and descent.  Additionally, I explained to him typically PSA was in the range of 1-4 but more recently has been downgraded to something less than 2 or even approaching 1.       I discussed the risk of family history particularly the fact that the average male has a 14% risk of prostate cancer and that in the face of a positive diagnosis in a father it will tablet and any other first-generation relative continued tablet insofar that a father and brother with prostate cancer will produce almost a 50% risk of prostate cancer.  I discussed the use of the temporal use of PSA as the best option for monitoring.  We also discussed the fact that an elevated PSA is an isolated event does not mean that this is prostate cancer and should not engender worry in this regard.      FINALLY, I also discussed other things that can elevate PSA including constipation, prostatitis, infection, recent intercourse etc, as well as the risks and benefits associated with this.  Also discussed the fact that this is a dilutional test and as a consequence of such, will occasionally produce slight variations on a single specimen.                                              PLAN  Further discussed the risks and benefits of a prostate biopsy as well if indicated.     His PSA was repeated today: Free and total PSA pending    We discussed getting an MRI of his  prostate. If PIRADS >4, we discussed getting a biopsy due to his positive family his of prostate CA (2 brothers now in 80S).    Pt had Stopped Flomax 0.4 mg as prescribed for BPH(patient reports unable to tolerate medication due to dizziness on numerous occasions.  He tried multiple times and failed)    I will call patient with results and definitive plan of care. if OK,      He will follow-up in 6 months      may return sooner if need be     Patient  agreeable plan of care.    Patient reports that he is not currently experiencing any symptoms of urinary incontinence.    BMI is >= 25 and <30. (Overweight) The following options were offered after discussion;: weight loss educational material (shared in after visit summary), exercise counseling/recommendations and nutrition counseling/recommendations    RADIOLOGY (CT AND/OR KUB):    CT Abdomen and Pelvis: No results found for this or any previous visit.     CT Stone Protocol: No results found for this or any previous visit.     KUB: No results found for this or any previous visit.     LABS (3 MONTHS):    Office Visit on 01/18/2023   Component Date Value Ref Range Status   • Color 01/18/2023 Yellow  Yellow, Straw, Dark Yellow, Maureen Final   • Clarity, UA 01/18/2023 Clear  Clear Final   • Specific Gravity  01/18/2023 1.015  1.005 - 1.030 Final   • pH, Urine 01/18/2023 6.5  5.0 - 8.0 Final   • Leukocytes 01/18/2023 Negative  Negative Final   • Nitrite, UA 01/18/2023 Negative  Negative Final   • Protein, POC 01/18/2023 Negative  Negative mg/dL Final   • Glucose, UA 01/18/2023 Negative  Negative mg/dL Final   • Ketones, UA 01/18/2023 Negative  Negative Final   • Urobilinogen, UA 01/18/2023 Normal  Normal, 0.2 E.U./dL Final   • Bilirubin 01/18/2023 Negative  Negative Final   • Blood, UA 01/18/2023 Negative  Negative Final   • Lot Number 01/18/2023 n   Final   • Expiration Date 01/18/2023 n   Final   • PSA 01/18/2023 5.5 (H)  0.0 - 4.0 ng/mL Final    Comment: Roche ECLIA  methodology.  According to the American Urological Association, Serum PSA should  decrease and remain at undetectable levels after radical  prostatectomy. The AUA defines biochemical recurrence as an initial  PSA value 0.2 ng/mL or greater followed by a subsequent confirmatory  PSA value 0.2 ng/mL or greater.  Values obtained with different assay methods or kits cannot be used  interchangeably. Results cannot be interpreted as absolute evidence  of the presence or absence of malignant disease.     • PSA, Free 01/18/2023 1.09  N/A ng/mL Final    Roche ECLIA methodology.   • PSA, Free % 01/18/2023 19.8  % Final    Comment: The table below lists the probability of prostate cancer for  men with non-suspicious PACO results and total PSA between  4 and 10 ng/mL, by patient age (Nina et al, RENNY 1998,  279:1542).                    % Free PSA       50-64 yr        65-75 yr                    0.00-10.00%        56%             55%                   10.01-15.00%        24%             35%                   15.01-20.00%        17%             23%                   20.01-25.00%        10%             20%                        >25.00%         5%              9%  Please note:  Nina et al did not make specific                recommendations regarding the use of                percent free PSA for any other population                of men.          IPSS Questionnaire (AUA-7):  Over the past month…    1)  How often have you had a sensation of not emptying your bladder completely after you finish urinating?  1 - Less than 1 time in 5   2)  How often have you had to urinate again less than two hours after you finished urinating? 2 - Less than half the time   3)  How often have you found you stopped and started again several times when you urinated?  0 - Not at all   4) How difficult have you found it to postpone urination?  2 - Less than half the time   5) How often have you had a weak urinary stream?  2 - Less than half the  time   6) How often have you had to push or strain to begin urination?  1 - Less than 1 time in 5   7) How many times did you most typically get up to urinate from the time you went to bed until the time you got up in the morning?  2 - 2 times   Total Score:  10     Smoking Cessation Counseling:  Never a smoker.  Patient does not currently use any tobacco products.     Follow Up   Return in about 6 months (around 7/18/2023) for Next scheduled follow up, FOR BPH WITH LUTS/PSA.    Patient was given instructions and counseling regarding his condition or for health maintenance advice. Please see specific information pulled into the AVS if appropriate.          This document has been electronically signed by Griselda Cheng-Akwa, APRN   January 19, 2023 20:35 EST      Dictated Utilizing Dragon Dictation: Part of this note may be an electronic transcription/translation of spoken language to printed text using the Dragon Dictation System.

## 2023-01-19 LAB
PSA FREE MFR SERPL: 19.8 %
PSA FREE SERPL-MCNC: 1.09 NG/ML
PSA SERPL-MCNC: 5.5 NG/ML (ref 0–4)

## 2023-01-20 ENCOUNTER — TELEPHONE (OUTPATIENT)
Dept: UROLOGY | Facility: CLINIC | Age: 78
End: 2023-01-20
Payer: MEDICARE

## 2023-01-20 NOTE — TELEPHONE ENCOUNTER
I called the pt and he said they called him andt it scheduled fo2/6    ----- Message from Griselda Cheng-Akwa, APRN sent at 1/19/2023  3:19 PM EST -----  Please kindly let patient know PSA results elevated at 5.5 at this time with a free PSA of 19.8%.  Although this only gives him a 20% chance of prostate cancer, due to significant family history I recommend a prostate MRI at this point.  Tell him it has been scheduled, and I will call him with results after he gets it for definitive plan of care including prostate biopsy if indicated.

## 2023-02-06 ENCOUNTER — HOSPITAL ENCOUNTER (OUTPATIENT)
Dept: MRI IMAGING | Facility: HOSPITAL | Age: 78
Discharge: HOME | End: 2023-02-06
Admitting: NURSE PRACTITIONER
Payer: MEDICARE

## 2023-02-06 LAB — CREAT BLDA-MCNC: 1.1 MG/DL (ref 0.6–1.3)

## 2023-02-06 PROCEDURE — 72197 MRI PELVIS W/O & W/DYE: CPT

## 2023-02-06 PROCEDURE — 72197 MRI PELVIS W/O & W/DYE: CPT | Performed by: RADIOLOGY

## 2023-02-06 PROCEDURE — A9577 INJ MULTIHANCE: HCPCS | Performed by: NURSE PRACTITIONER

## 2023-02-06 PROCEDURE — 82565 ASSAY OF CREATININE: CPT

## 2023-02-06 PROCEDURE — 0 GADOBENATE DIMEGLUMINE 529 MG/ML SOLUTION: Performed by: NURSE PRACTITIONER

## 2023-02-06 RX ADMIN — GADOBENATE DIMEGLUMINE 18 ML: 529 INJECTION, SOLUTION INTRAVENOUS at 11:05

## 2023-02-21 ENCOUNTER — TELEPHONE (OUTPATIENT)
Dept: UROLOGY | Facility: CLINIC | Age: 78
End: 2023-02-21
Payer: MEDICARE

## 2023-02-21 NOTE — TELEPHONE ENCOUNTER
I called the pt and read him the radiologist report and told him that Vicente would discuss this further with him and she would call or have us call if anything was abnormal

## 2023-04-24 ENCOUNTER — TELEPHONE (OUTPATIENT)
Dept: GASTROENTEROLOGY | Facility: CLINIC | Age: 78
End: 2023-04-24
Payer: MEDICARE

## 2023-04-24 NOTE — TELEPHONE ENCOUNTER
CALLED PATIENT TO CHECK ON HIM, ALSO REVIEWED MRI RESULTS WITH HIM/WIFE AT THIS TIME WHICH SHOW                                      FINDINGS:    PROSTATE:  Left anterior transitional zone 8 mm heterogeneous T2  signal with diffusion restriction consistent with PI-RAD 4 lesion. The  gland is enlarged and shows multiple otherwise probable BPH nodules.    SEMINAL VESICLES:  Unremarkable.  Normal signal.  No lesion  identified.    NEUROVASCULAR BUNDLES:  Unremarkable.    LYMPH NODES:  Unremarkable.  No enlarged lymph nodes.      IMPRESSION:    Left anterior transitional zone 8 mm heterogeneous T2 signal with  diffusion restriction consistent with PI-RAD 4 lesion.    Patient was initially seen in clinic in February WITH PSA results elevated at 5.5  with a free PSA of 19.8%.  Although this only gives him a 20% chance of prostate cancer, due to significant family history-HIS BROTHER,  I recommendED a prostate MRI at this point.     PATIENT HAD DECLINED A PACO ON INTIAL EVALUATION.    HE WANTS TO PROCEED WITH BIOPSY.    PLEASE CALL TO SCHEDULE. THANK YOU

## 2023-04-24 NOTE — TELEPHONE ENCOUNTER
Called patient back and he wanted MRI prostate results. Route to Vicente to call back or tell me what to tell patient.

## 2023-04-25 DIAGNOSIS — R97.20 BPH WITH ELEVATED PSA: Primary | ICD-10-CM

## 2023-04-25 DIAGNOSIS — N40.0 BPH WITH ELEVATED PSA: Primary | ICD-10-CM

## 2023-04-25 RX ORDER — CIPROFLOXACIN 500 MG/1
TABLET, FILM COATED ORAL
Qty: 4 TABLET | Refills: 0 | Status: SHIPPED | OUTPATIENT
Start: 2023-04-25

## 2023-04-25 RX ORDER — DIAZEPAM 10 MG/1
TABLET ORAL
Qty: 2 TABLET | Refills: 0 | Status: SHIPPED | OUTPATIENT
Start: 2023-04-25

## 2023-04-25 RX ORDER — SODIUM PHOSPHATE,MONO-DIBASIC 19G-7G/118
ENEMA (ML) RECTAL
Qty: 1 ENEMA | Refills: 0 | Status: SHIPPED | OUTPATIENT
Start: 2023-04-25

## 2023-04-25 RX ORDER — CLINDAMYCIN HYDROCHLORIDE 300 MG/1
300 CAPSULE ORAL 2 TIMES DAILY
Qty: 6 CAPSULE | Refills: 0 | Status: SHIPPED | OUTPATIENT
Start: 2023-04-25 | End: 2023-04-28

## 2023-06-06 ENCOUNTER — TELEPHONE (OUTPATIENT)
Dept: UROLOGY | Facility: CLINIC | Age: 78
End: 2023-06-06
Payer: MEDICARE

## 2023-06-07 ENCOUNTER — TELEPHONE (OUTPATIENT)
Dept: UROLOGY | Facility: CLINIC | Age: 78
End: 2023-06-07
Payer: MEDICARE

## 2023-06-08 ENCOUNTER — PROCEDURE VISIT (OUTPATIENT)
Dept: UROLOGY | Facility: CLINIC | Age: 78
End: 2023-06-08
Payer: MEDICARE

## 2023-06-08 VITALS
WEIGHT: 200.2 LBS | HEIGHT: 68 IN | HEART RATE: 72 BPM | DIASTOLIC BLOOD PRESSURE: 95 MMHG | SYSTOLIC BLOOD PRESSURE: 170 MMHG | BODY MASS INDEX: 30.34 KG/M2

## 2023-06-08 DIAGNOSIS — R97.20 BPH WITH ELEVATED PSA: Primary | ICD-10-CM

## 2023-06-08 DIAGNOSIS — N40.0 BPH WITH ELEVATED PSA: Primary | ICD-10-CM

## 2023-06-08 RX ORDER — GENTAMICIN SULFATE 40 MG/ML
80 INJECTION, SOLUTION INTRAMUSCULAR; INTRAVENOUS ONCE
Status: COMPLETED | OUTPATIENT
Start: 2023-06-08 | End: 2023-06-08

## 2023-06-08 RX ADMIN — GENTAMICIN SULFATE 80 MG: 40 INJECTION, SOLUTION INTRAMUSCULAR; INTRAVENOUS at 14:47

## 2023-06-08 NOTE — PROGRESS NOTES
Chief Complaint:      Chief Complaint   Patient presents with    Elevated PSA     Prostate Bx        HPI:   77 y.o. male 77-year-old male who returns to clinic today 01/19/22 for evaluation.  This is his his 6 months follow-up on elevated PSA.  Patient's most  recent PSA IS 4.9, done 8 months ago on 05/18/2022.  His PSA prior was 3.7, with a free PSA of 22.2% done ON 11/18/2021. Patient is here for PSA recheck. He is in no apparent discomfort and reports doing relatively well.     He HAD  initially been referred to us by his PCP for an Elevated PSA of 5.1. He was unsure of his PSA prior to this, but reports an increasing trend over the last few years. He has a significant family history of Prostate Cancer-His Brother, diagnosed over 20 years ago- still alive in his 80s     TODAY, He is glad to be doing better at this age, Patient denies any issues with BPH recently. Besides occasional Nocturia 1-2 times, a weak urinary stream, He denies  having any urinary symptoms of frequency, urgency or dysuria, He does not have Recurrent UTIs, he denies any burning on urination, Urinary hesitancy or post void dribbling. He apparently is on No medication for his prostate.     The patient was started on Flomax 6 months ago but reports he has been unable to tolerate medications due to dizzy spells.  He Denies pelvic pressure or supra pubic discomfort. Denies abdominal, back or flank pain. No N/V/D. Denies chills or fevers. His urine dipstick today is completely negative for any infection, it is negative for gross/microscopic hematuria. His IPSS score is 10, his PVR> 32     We also discussed his systolic blood pressure 183 and 108 today patient will continue blood pressure monitoring and follow-up with PCP     We also discussed getting a prostate MRI due to his rising PSA     He is here  for ultrasound with biopsy.  He has an impressive history of longevity with his father living well past the age of 100          Past Medical  History:     Past Medical History:   Diagnosis Date    Elevated PSA     Hypertension        Current Meds:     Current Outpatient Medications   Medication Sig Dispense Refill    ciprofloxacin (Cipro) 500 MG tablet Take one tablet twice a day before procedure 4 tablet 0    diazePAM (VALIUM) 10 MG tablet One tablet night before procedure at bedtime and one morning of procedure 2 tablet 0    dilTIAZem CD (CARDIZEM CD) 180 MG 24 hr capsule Take 1 capsule by mouth Daily.      losartan (COZAAR) 100 MG tablet Take 1 tablet by mouth Daily.      pravastatin (PRAVACHOL) 20 MG tablet Take 1 tablet by mouth Daily.      predniSONE (DELTASONE) 2.5 MG tablet       Sodium Phosphates ([employer] Enema Disposable) 19-7 GM/118ML enema Use morning of the procedure 1 enema 0    tamsulosin (FLOMAX) 0.4 MG capsule 24 hr capsule Take 1 capsule by mouth Daily. 30 capsule 5     No current facility-administered medications for this visit.        Allergies:      No Known Allergies     Past Surgical History:     Past Surgical History:   Procedure Laterality Date    APPENDECTOMY      SKIN GRAFT         Social History:     Social History     Socioeconomic History    Marital status:    Tobacco Use    Smoking status: Never    Smokeless tobacco: Never   Substance and Sexual Activity    Alcohol use: Never    Drug use: Never    Sexual activity: Defer       Family History:     Family History   Problem Relation Age of Onset    No Known Problems Father     Cancer Mother        Review of Systems:     Review of Systems   Constitutional: Negative.    HENT: Negative.     Eyes: Negative.    Respiratory: Negative.     Cardiovascular: Negative.    Gastrointestinal: Negative.    Endocrine: Negative.    Musculoskeletal: Negative.    Allergic/Immunologic: Negative.    Neurological: Negative.    Hematological: Negative.    Psychiatric/Behavioral: Negative.       Physical Exam:     Physical Exam  Vitals and nursing note reviewed.   Constitutional:       Appearance:  He is well-developed.   HENT:      Head: Normocephalic and atraumatic.   Eyes:      Conjunctiva/sclera: Conjunctivae normal.      Pupils: Pupils are equal, round, and reactive to light.   Cardiovascular:      Rate and Rhythm: Normal rate and regular rhythm.      Heart sounds: Normal heart sounds.   Pulmonary:      Effort: Pulmonary effort is normal.      Breath sounds: Normal breath sounds.   Abdominal:      General: Bowel sounds are normal.      Palpations: Abdomen is soft.   Genitourinary:     Penis: Normal.       Testes: Normal.      Prostate: Normal.      Rectum: Normal.   Musculoskeletal:         General: Normal range of motion.      Cervical back: Normal range of motion.   Skin:     General: Skin is warm and dry.   Neurological:      Mental Status: He is alert and oriented to person, place, and time.      Deep Tendon Reflexes: Reflexes are normal and symmetric.   Psychiatric:         Behavior: Behavior normal.         Thought Content: Thought content normal.         Judgment: Judgment normal.       I have reviewed the following portions of the patient's history: Allergies, current medications, past family history, past medical history, past social history, past surgical history, problem list, and ROS and confirm it is accurate.    Recent Image (CT and/or KUB):      CT Abdomen and Pelvis: No results found for this or any previous visit.       CT Stone Protocol: No results found for this or any previous visit.       KUB: No results found for this or any previous visit.       Labs (past 3 months):      No visits with results within 3 Month(s) from this visit.   Latest known visit with results is:   Hospital Outpatient Visit on 02/06/2023   Component Date Value Ref Range Status    Creatinine 02/06/2023 1.10  0.60 - 1.30 mg/dL Final    Serial Number: [id number]  [id number]        Procedure:   Prostate ultrasound and biopsy:  77 year-old white male with a history of an elevated PSA and a significant increase in his  risk for prostate cancer.  We discussed the prostate biopsy at length.  We discussed the significant risks of anesthesia, bleeding, infection, urosepsis, purported effects on erectile function, and rectal bleeding requiring surgical intervention.  He was given a pre-procedural enema.  He was pretreated with ciprofloxacin for 3 days.  He was given periprocedural gentamicin at the dose of 80 mg in an intramuscular fashion and given post biopsy clindamycin for 3 days.  Following an extensive informed consent, he was brought to the operative suite, placed in the left lateral decubitus position.  He was given his prophylactic gentamicin.  The rectum was anesthetized with 20 mL of 2% viscous Xylocaine jelly.  I then used the BK biplanar probe inserted into the rectum and made measurements of the prostate.  The height was 3.85 cm, the width was 5.02 cm, and the length was 4.74 cm for a volume of 47.63 g.  There were no obvious hypoechoic areas.  There was no intravesical median lobe.  There was minimal calcification between the transition and peripheral zone.  I then injected 20 mL of 1% Xylocaine in the perirectal area and raised of skin wheal to provide anesthesia after an adequate period of topical anesthesia. I used the Biopty gun to take a total of 10 cores without complication.  He tolerated this extremely well.  Cores.  There was no bleeding or complications.   Impression: Elevated PSA s/p biopsy.    Assessment/Plan:   Elevated prostate specific antigen-we discussed the diagnosis of elevated prostate-specific antigen.  I explained the pathophysiology of PSA.  It is a serine protease that's function in the male reproductive tract is to facilitate the liquefaction of semen.  It is for this reason the body does not want it freely floating in the serum and why typically bound tightly to albumin.  We discussed why we used both a PSA free and total to determine the need for more aggressive therapy. I discussed the normal  range.  Additionally, it was in the range of 1 to 4, but more recently has been downgraded to something less than 2 or even approaching 1.  I discussed the risk of family history, particularly the fact that the average male has a 14% risk of prostate cancer and that in the face of a positive diagnosis in a father it will tablet and any other first-generation relative continued tablet insofar that a father and brother with prostate cancer will produce almost a 50% risk of prostate cancer.  I discussed the use of the temporal use of PSA as the best option for monitoring.  We also discussed the fact that an elevated PSA is an isolated event does not mean that this is prostate cancer and should not engender worry in this regard. I discussed other things that can elevate PSA including constipation, prostatitis, infection, recent intercourse etc., as well as the risks and benefits associated with this.  Also discussed the fact that this is with a dilutional test and as a consequence of such were present produce slight variations on a single specimen.  Further discussed the risks and benefits of a prostate biopsy as well.  Results                         This document has been electronically signed by FRANCISCO ELAINE MD June 8, 2023 13:21 EDT    Dictated Utilizing Dragon Dictation: Part of this note may be an electronic transcription/translation of spoken language to printed text using the Dragon Dictation System.

## 2023-06-09 PROBLEM — N40.0 BPH WITH ELEVATED PSA: Status: ACTIVE | Noted: 2023-06-09

## 2023-06-09 PROBLEM — R97.20 BPH WITH ELEVATED PSA: Status: ACTIVE | Noted: 2023-06-09

## 2023-06-15 ENCOUNTER — OFFICE VISIT (OUTPATIENT)
Dept: UROLOGY | Facility: CLINIC | Age: 78
End: 2023-06-15
Payer: MEDICARE

## 2023-06-15 VITALS
HEIGHT: 68 IN | DIASTOLIC BLOOD PRESSURE: 97 MMHG | BODY MASS INDEX: 30.31 KG/M2 | WEIGHT: 200 LBS | HEART RATE: 87 BPM | SYSTOLIC BLOOD PRESSURE: 169 MMHG

## 2023-06-15 DIAGNOSIS — C61 GLEASON GRADE GROUP 1 ADENOCARCINOMA OF PROSTATE: Primary | ICD-10-CM

## 2023-06-15 NOTE — PROGRESS NOTES
"Chief Complaint:    Chief Complaint   Patient presents with    BPH with elevated PSA       Vital Signs:   /97 (BP Location: Left arm, Patient Position: Sitting, Cuff Size: Adult)   Pulse 87   Ht 172.7 cm (67.99\")   Wt 90.7 kg (200 lb)   BMI 30.42 kg/m²   Body mass index is 30.42 kg/m².      HPI:  Crow Zeng is a 77 y.o. male who presents today for initial evaluation     History of Present Illness  Mr. Hobson presents to the clinic for a 1 week follow-up after undergoing a prostate biopsy by Dr. Hernandez.  He did have an MRI completed by [facility] that showed a PI-RADS 4 lesion measuring roughly 8 mm.  Patient denies any significant problems since biopsy including but not limited to fever, chills, obstipation, hematochezia, or gross hematuria.  His pathology report shows that he has 1 core positive out of 8.  All other core biopsy results showed BPH tissue with atrophic and acute/chronic inflammation.  He has 1 core biopsy prostatic acinar adenocarcinoma was a grade group 1 with a Jason score 3+3.  His prostate cancer involves less than 5% of the specimen.  He has no other complaints at this time.  It is to note that he does have a significant family history of prostate cancer in his brother who was diagnosed and treated at the age of 62 with no reoccurrence.    Past Medical History:  Past Medical History:   Diagnosis Date    BPH with elevated PSA 6/9/2023    Elevated PSA     Hypertension        Current Meds:  Current Outpatient Medications   Medication Sig Dispense Refill    diazePAM (VALIUM) 10 MG tablet One tablet night before procedure at bedtime and one morning of procedure 2 tablet 0    dilTIAZem CD (CARDIZEM CD) 180 MG 24 hr capsule Take 1 capsule by mouth Daily.      losartan (COZAAR) 100 MG tablet Take 1 tablet by mouth Daily.      pravastatin (PRAVACHOL) 20 MG tablet Take 1 tablet by mouth Daily.      predniSONE (DELTASONE) 2.5 MG tablet       Sodium Phosphates ([employer] Enema Disposable) 19-7 GM/118ML " enema Use morning of the procedure 1 enema 0    tamsulosin (FLOMAX) 0.4 MG capsule 24 hr capsule Take 1 capsule by mouth Daily. 30 capsule 5    ciprofloxacin (Cipro) 500 MG tablet Take one tablet twice a day before procedure (Patient not taking: Reported on 6/15/2023) 4 tablet 0     No current facility-administered medications for this visit.        Allergies:   No Known Allergies     Past Surgical History:  Past Surgical History:   Procedure Laterality Date    APPENDECTOMY      SKIN GRAFT         Social History:  Social History     Socioeconomic History    Marital status:    Tobacco Use    Smoking status: Never    Smokeless tobacco: Never   Substance and Sexual Activity    Alcohol use: Never    Drug use: Never    Sexual activity: Defer       Family History:  Family History   Problem Relation Age of Onset    No Known Problems Father     Cancer Mother        Review of Systems:  Review of Systems   Constitutional:  Negative for fatigue, fever and unexpected weight change.   Respiratory:  Negative for chest tightness and shortness of breath.    Cardiovascular:  Negative for chest pain.   Gastrointestinal:  Negative for abdominal pain, constipation, diarrhea, nausea and vomiting.   Genitourinary:  Negative for decreased urine volume, difficulty urinating, dysuria, enuresis, flank pain, frequency, genital sores, hematuria, penile discharge, penile pain, penile swelling, scrotal swelling, testicular pain and urgency.   Skin:  Negative for rash.   Psychiatric/Behavioral:  Negative for confusion and suicidal ideas.      Physical Exam:  Physical Exam  Constitutional:       General: He is not in acute distress.     Appearance: Normal appearance.   HENT:      Head: Normocephalic and atraumatic.      Nose: Nose normal.      Mouth/Throat:      Mouth: Mucous membranes are moist.   Eyes:      Conjunctiva/sclera: Conjunctivae normal.   Cardiovascular:      Rate and Rhythm: Normal rate and regular rhythm.      Pulses: Normal  pulses.      Heart sounds: Normal heart sounds.   Pulmonary:      Effort: Pulmonary effort is normal.      Breath sounds: Normal breath sounds.   Abdominal:      General: Bowel sounds are normal.      Palpations: Abdomen is soft.      Tenderness: There is no right CVA tenderness or left CVA tenderness.   Musculoskeletal:         General: Normal range of motion.      Cervical back: Normal range of motion.   Skin:     General: Skin is warm.   Neurological:      General: No focal deficit present.      Mental Status: He is alert and oriented to person, place, and time.   Psychiatric:         Mood and Affect: Mood normal.         Behavior: Behavior normal.         Thought Content: Thought content normal.         Judgment: Judgment normal.         Recent Image (CT and/or KUB):   CT Abdomen and Pelvis: No results found for this or any previous visit.     CT Stone Protocol: No results found for this or any previous visit.     KUB: No results found for this or any previous visit.       Labs:  Brief Urine Lab Results  (Last result in the past 365 days)        Color   Clarity   Blood   Leuk Est   Nitrite   Protein   CREAT   Urine HCG        01/18/23 1121 Yellow   Clear   Negative   Negative   Negative   Negative                 No visits with results within 3 Month(s) from this visit.   Latest known visit with results is:   Hospital Outpatient Visit on 02/06/2023   Component Date Value Ref Range Status    Creatinine 02/06/2023 1.10  0.60 - 1.30 mg/dL Final    Serial Number: [id number]:  [id number]        Procedure: None  Procedures     I have reviewed and agree with the above PMH, PSH, FMH, social history, medications, allergies, and labs.     Assessment/Plan:   Problem List Items Addressed This Visit          Other    [place]ust grade group 1 adenocarcinoma of prostate - Primary       Health Maintenance:   Health Maintenance Due   Topic Date Due    COVID-19 Vaccine (1) Never done    TDAP/TD VACCINES (1 - Tdap) Never done     ZOSTER VACCINE (1 of 2) Never done    Pneumococcal Vaccine 65+ (1 - PCV) Never done    HEPATITIS C SCREENING  Never done        Smoking Counseling: Never smoked or use smokeless tobacco    Urine Incontinence: Patient reports that he is not currently experiencing any symptoms of urinary incontinence.    Patient was given instructions and counseling regarding his condition or for health maintenance advice. Please see specific information pulled into the AVS if appropriate.    Patient Education:   Prostate carcinoma -I discussed with the patient his most recent pathology report showing a grade group 1 with a [place] score of 3+3 adenocarcinoma of the prostate.  Advised him that there was only 1 core positive out of 7.  This was only 5% of the tissue sample.  Given the patient's low risk prostate carcinoma at this time I will send off for an Oncotype.  I did advise patient that the Oncotype will give us an informed look of his risk for death within 10 years, metastasis within 10 years, and recurrence rate posttreatment within 10 years.  I did discuss with the patient the use of hormonal replacement therapy such as Lupron injections and chemotherapy tablets such as Casodex.  Also discussed the use of referral to radiation/oncology for further treatment.  Given the patient's 1 core positive biopsy will hold off until he received Oncotype.  I will call patient with results.  I will also discuss this with Dr. Hernandez upon his return.  I believe patient will most likely benefit from careful watching and waiting.  Advised patient that if he begins to have any problems to return to the clinic sooner.  Patient verbalized understanding and agreed to plan of care.    Visit Diagnoses:    ICD-10-CM ICD-9-CM   1. Jason grade group 1 adenocarcinoma of prostate  [id number] [id number]       Meds Ordered During Visit:  No orders of the defined types were placed in this encounter.      Follow Up Appointment: 1 month pending Oncotype.  No follow-ups  on file.      This document has been electronically signed by Dhruv Rider PA-C   June 16, 2023 08:26 EDT    Part of this note may be an electronic transcription/translation of spoken language to printed text using the Dragon Dictation System.

## 2023-06-16 PROBLEM — C61 GLEASON GRADE GROUP 1 ADENOCARCINOMA OF PROSTATE: Status: ACTIVE | Noted: 2023-06-16

## 2024-01-08 ENCOUNTER — OFFICE VISIT (OUTPATIENT)
Dept: UROLOGY | Facility: CLINIC | Age: 79
End: 2024-01-08
Payer: MEDICARE

## 2024-01-08 VITALS
HEIGHT: 68 IN | HEART RATE: 72 BPM | SYSTOLIC BLOOD PRESSURE: 168 MMHG | BODY MASS INDEX: 31.13 KG/M2 | WEIGHT: 205.4 LBS | DIASTOLIC BLOOD PRESSURE: 104 MMHG

## 2024-01-08 DIAGNOSIS — C61 GLEASON GRADE GROUP 1 ADENOCARCINOMA OF PROSTATE: Primary | ICD-10-CM

## 2024-01-08 DIAGNOSIS — R33.9 INCOMPLETE EMPTYING OF BLADDER: ICD-10-CM

## 2024-01-08 PROCEDURE — 36415 COLL VENOUS BLD VENIPUNCTURE: CPT

## 2024-01-08 PROCEDURE — 99213 OFFICE O/P EST LOW 20 MIN: CPT

## 2024-01-08 PROCEDURE — 1159F MED LIST DOCD IN RCRD: CPT

## 2024-01-08 PROCEDURE — 1160F RVW MEDS BY RX/DR IN RCRD: CPT

## 2024-01-08 RX ORDER — DOXAZOSIN MESYLATE 4 MG/1
4 TABLET ORAL NIGHTLY
Qty: 30 TABLET | Refills: 5 | Status: SHIPPED | OUTPATIENT
Start: 2024-01-08

## 2024-01-08 NOTE — PROGRESS NOTES
"Chief Complaint:    Chief Complaint   Patient presents with    Prostate Cancer    Benign Prostatic Hypertrophy     6 month fu        Vital Signs:   BP (!) 168/104   Pulse 72   Ht 172.7 cm (67.99\")   Wt 93.2 kg (205 lb 6.4 oz)   BMI 31.24 kg/m²   Body mass index is 31.24 kg/m².      HPI:  Crow Zeng is a 78 y.o. male who presents today for follow up    History of Present Illness  Mr. Hobson presents to the clinic for elevated PSA and prostate adenocarcinoma.  Patient had a elevated PSA and underwent an MRI that revealed a PI-RADS 4 lesion.  He then underwent a biopsy by Dr. Hernandez in mid 2023.  Pathology report came back with 1 core +3+3 Jason score with only 5% of tissue sampled including the prostate adenocarcinoma.  Recommended to send the patient's pathology off for Oncotype and.  Due to the insufficient amount of cancer present on biopsy piece they are unable to perform an Oncotype in.  Patient was scheduled to follow-up a month later however no showed several appointments.  He presents today for reevaluation.  He complains of some difficulty urinating.  He has a current IPSS score of 10.  He gets up roughly 2 times throughout the night.  He endorses weak stream, urgency, frequency, and sensation of incomplete emptying.  He has not had a PSA checked since last office visit.  He has taken Flomax in the past however discontinued medication due to side effects.      Past Medical History:  Past Medical History:   Diagnosis Date    BPH with elevated PSA 6/9/2023    Elevated PSA     Hypertension        Current Meds:  Current Outpatient Medications   Medication Sig Dispense Refill    dilTIAZem CD (CARDIZEM CD) 180 MG 24 hr capsule Take 1 capsule by mouth Daily.      losartan (COZAAR) 100 MG tablet Take 1 tablet by mouth Daily.      pravastatin (PRAVACHOL) 20 MG tablet Take 1 tablet by mouth Daily.      predniSONE (DELTASONE) 2.5 MG tablet       doxazosin (CARDURA) 4 MG tablet Take 1 tablet by mouth Every " Night. 30 tablet 5     No current facility-administered medications for this visit.        Allergies:   No Known Allergies     Past Surgical History:  Past Surgical History:   Procedure Laterality Date    APPENDECTOMY      SKIN GRAFT         Social History:  Social History     Socioeconomic History    Marital status:    Tobacco Use    Smoking status: Never    Smokeless tobacco: Never   Substance and Sexual Activity    Alcohol use: Never    Drug use: Never    Sexual activity: Defer       Family History:  Family History   Problem Relation Age of Onset    No Known Problems Father     Cancer Mother        Review of Systems:  Review of Systems   Constitutional:  Negative for chills, fatigue and fever.   HENT:  Negative for congestion and sinus pressure.    Respiratory:  Negative for chest tightness and shortness of breath.    Cardiovascular:  Negative for chest pain.   Gastrointestinal:  Positive for constipation. Negative for abdominal pain, diarrhea, nausea and vomiting.   Genitourinary:  Positive for difficulty urinating and frequency. Negative for dysuria, flank pain, hematuria and urgency.   Musculoskeletal:  Negative for back pain and neck pain.   Skin:  Negative for rash.   Allergic/Immunologic: Negative for food allergies.   Neurological:  Negative for dizziness and headaches.   Hematological:  Bruises/bleeds easily.   Psychiatric/Behavioral:  The patient is not nervous/anxious.        Physical Exam:  Physical Exam  Constitutional:       General: He is not in acute distress.     Appearance: Normal appearance.   HENT:      Head: Normocephalic and atraumatic.      Nose: Nose normal.      Mouth/Throat:      Mouth: Mucous membranes are moist.   Eyes:      Conjunctiva/sclera: Conjunctivae normal.   Cardiovascular:      Rate and Rhythm: Normal rate and regular rhythm.      Pulses: Normal pulses.      Heart sounds: Normal heart sounds.   Pulmonary:      Effort: Pulmonary effort is normal.      Breath sounds:  Normal breath sounds.   Abdominal:      General: Bowel sounds are normal.      Palpations: Abdomen is soft.   Musculoskeletal:         General: Normal range of motion.      Cervical back: Normal range of motion.   Skin:     General: Skin is warm.   Neurological:      General: No focal deficit present.      Mental Status: He is alert and oriented to person, place, and time.   Psychiatric:         Mood and Affect: Mood normal.         Behavior: Behavior normal.         Thought Content: Thought content normal.         Judgment: Judgment normal.         IPSS Questionnaire (AUA-7):  IPSS Questionnaire (AUA-7):                  IPSS Questionnaire (AUA-7):  Over the past month…    1)  Incomplete Emptying  How often have you had a sensation of not emptying your bladder?  2 - Less than half the time   2)  Frequency  How often have you had to urinate less than every two hours? 1 - Less than 1 time in 5   3)  Intermittency  How often have you found you stopped and started again several times when you urinated?  1 - Less than 1 time in 5   4) Urgency  How often have you found it difficult to postpone urination?  2 - Less than half the time   5) Weak Stream  How often have you had a weak urinary stream?  2 - Less than half the time   6) Straining  How often have you had to push or strain to begin urination?  0 - Not at all   7) Nocturia  How many times did you typically get up at night to urinate?  2 - 2 times   Total Score:  10   The International Prostate Symptom Score (IPSS) is used to screen, diagnose, track symptoms of benign prostatic hyperplasia (BPH).    0-7 pts (Mild Symptoms)  / 8-19 pts (Moderate) / 20-35 (Severe)    Quality of life due to urinary symptoms:  If you were to spend the rest of your life with your urinary condition the way it is now, how would you feel about that? 3-Mixed   Urine Leakage (Incontinence) 0-No Leakage       Recent Image (CT and/or KUB):   CT Abdomen and Pelvis: No results found for this or  any previous visit.     CT Stone Protocol: No results found for this or any previous visit.     KUB: No results found for this or any previous visit.       Labs:  Brief Urine Lab Results  (Last result in the past 365 days)        Color   Clarity   Blood   Leuk Est   Nitrite   Protein   CREAT   Urine HCG        01/18/23 1121 Yellow   Clear   Negative   Negative   Negative   Negative                 No visits with results within 3 Month(s) from this visit.   Latest known visit with results is:   Hospital Outpatient Visit on 02/06/2023   Component Date Value Ref Range Status    Creatinine 02/06/2023 1.10  0.60 - 1.30 mg/dL Final    Serial Number: [id number]:  [id number]        Procedure: None  Procedures     Assessment/Plan:   Problem List Items Addressed This Visit          Other    [place] grade group 1 adenocarcinoma of prostate - Primary    Relevant Medications    doxazosin (CARDURA) 4 MG tablet    Other Relevant Orders    PSA Total+% Free (Serial)     Other Visit Diagnoses       Incomplete emptying of bladder        Relevant Medications    doxazosin (CARDURA) 4 MG tablet    Other Relevant Orders    PSA Total+% Free (Serial)            Health Maintenance:   Health Maintenance Due   Topic Date Due    COVID-19 Vaccine (1) Never done    TDAP/TD VACCINES (1 - Tdap) Never done    ZOSTER VACCINE (1 of 2) Never done    Pneumococcal Vaccine 65+ (1 of 1 - PCV) Never done    HEPATITIS C SCREENING  Never done    INFLUENZA VACCINE  Never done    ANNUAL WELLNESS VISIT  01/06/2024        Smoking Counseling: Never smoked or use smokeless tobacco    Urine Incontinence: Patient reports that he is not currently experiencing any symptoms of urinary incontinence.    Patient was given instructions and counseling regarding his condition or for health maintenance advice. Please see specific information pulled into the AVS if appropriate.    Patient Education:   Jason grade group 1 adenocarcinoma the prostate with incomplete emptying  -at this time I am recommending a repeat PSA free and total in office today.  Did discuss the use of PSA including identifying for any increased risk for spreading of prostatic carcinoma.  Advised patient that if PSA remains elevated we will continue forward with a CT scan of the abdomen pelvis with and without contrast as well as nuclear medicine whole-body scan.  Discussed these procedures in detail including risk and benefits.  Given patient's lower urinary tract symptoms and failure of therapy in the past due to side effects and recommended trial of doxazosin 4 mg once nightly.  Discussed the risk benefits of this medication.  Advised him to take as prescribed.  I will call patient with PSA results once obtained.  Advised him if PSA is high we can also consider hormonal androgen deprivation therapy versus chemotherapy tablet such as Casodex.  Discussed the risk and benefits of both of these in detail with the patient.  Also discussed referral to oncology if there is concerns for metastatic disease.  Otherwise we will place him back in 3 months.    Visit Diagnoses:    ICD-10-CM ICD-9-CM   1. [place] grade group 1 adenocarcinoma of prostate  C61 185   2. Incomplete emptying of bladder  R33.9 788.21       Meds Ordered During Visit:  New Medications Ordered This Visit   Medications    doxazosin (CARDURA) 4 MG tablet     Sig: Take 1 tablet by mouth Every Night.     Dispense:  30 tablet     Refill:  5       Follow Up Appointment: 3 months  No follow-ups on file.      This document has been electronically signed by Dhruv Rider PA-C   January 8, 2024 15:24 EST    Part of this note may be an electronic transcription/translation of spoken language to printed text using the Dragon Dictation System.

## 2024-01-09 LAB
PSA FREE MFR SERPL: 25.2 %
PSA FREE SERPL-MCNC: 1.26 NG/ML
PSA SERPL-MCNC: 5 NG/ML (ref 0–4)

## 2024-01-10 ENCOUNTER — TELEPHONE (OUTPATIENT)
Dept: UROLOGY | Facility: CLINIC | Age: 79
End: 2024-01-10
Payer: MEDICARE

## 2024-01-10 PROBLEM — N40.0 BPH WITH ELEVATED PSA: Status: RESOLVED | Noted: 2023-06-09 | Resolved: 2024-01-10

## 2024-01-10 PROBLEM — R97.20 BPH WITH ELEVATED PSA: Status: RESOLVED | Noted: 2023-06-09 | Resolved: 2024-01-10

## 2024-01-10 NOTE — TELEPHONE ENCOUNTER
Called patient advised that PSA had decreased from 5.5-5.  His free percentage was still great.  Recommend to continue with observation.  Advised patient to report to the clinic sooner if needed.

## 2024-04-11 ENCOUNTER — OFFICE VISIT (OUTPATIENT)
Dept: UROLOGY | Facility: CLINIC | Age: 79
End: 2024-04-11
Payer: MEDICARE

## 2024-04-11 VITALS
DIASTOLIC BLOOD PRESSURE: 92 MMHG | HEIGHT: 68 IN | SYSTOLIC BLOOD PRESSURE: 170 MMHG | BODY MASS INDEX: 30.98 KG/M2 | HEART RATE: 69 BPM | WEIGHT: 204.4 LBS

## 2024-04-11 DIAGNOSIS — R33.9 INCOMPLETE EMPTYING OF BLADDER: ICD-10-CM

## 2024-04-11 DIAGNOSIS — C61 GLEASON GRADE GROUP 1 ADENOCARCINOMA OF PROSTATE: Primary | ICD-10-CM

## 2024-04-11 RX ORDER — TADALAFIL 5 MG/1
5 TABLET ORAL DAILY
Qty: 90 TABLET | Refills: 3 | Status: SHIPPED | OUTPATIENT
Start: 2024-04-11

## 2024-04-11 NOTE — PROGRESS NOTES
"Chief Complaint:    Chief Complaint   Patient presents with    [place] grade group 1 adenocarcinoma of prostate     3 month follow up       Vital Signs:   /92   Pulse 69   Ht 172.7 cm (67.99\")   Wt 92.7 kg (204 lb 6.4 oz)   BMI 31.09 kg/m²   Body mass index is 31.09 kg/m².      HPI:  Crow Zeng is a 78 y.o. male who presents today for follow up    History of Present Illness  Mr. Zeng presents to the clinic for elevated PSA and prostate adenocarcinoma.  Patient had a elevated PSA and underwent an MRI that revealed a PI-RADS 4 lesion.  He then underwent a biopsy by Dr. Hernandez in mid 2023.  Pathology report came back with 1 core 3+3 Jason score with only 5% of tissue sampled including the prostate adenocarcinoma.  Recommended to send the patient's pathology off for Oncotype and due to the insufficient amount of cancer present on biopsy piece they are unable to perform an Oncotype.  Patient was scheduled to follow-up a month later however no showed several appointments.  He was last seen 3 months ago due to worsening lower urinary tract symptoms.  He has taken Flomax in the past with minimal improvement was started on doxazosin at that time.  Patient states he discontinued medication due to significant sinus pressure and congestion.  He had a PSA taken in January of this year that remained stable at roughly 5 with a free percentage of 25.  He has a current IPSS score is 7.  He gets up roughly 2 times throughout the night and endorses a weak stream with intermittency and some straining to begin with urination.  He denies any urgency, frequency, or sensation of incomplete emptying.      Past Medical History:  Past Medical History:   Diagnosis Date    BPH with elevated PSA 6/9/2023    Elevated PSA     Hypertension        Current Meds:  Current Outpatient Medications   Medication Sig Dispense Refill    dilTIAZem CD (CARDIZEM CD) 180 MG 24 hr capsule Take 1 capsule by mouth Daily.      losartan (COZAAR) 100 " MG tablet Take 1 tablet by mouth Daily.      pravastatin (PRAVACHOL) 20 MG tablet Take 1 tablet by mouth Daily.      predniSONE (DELTASONE) 2.5 MG tablet       tadalafil (CIALIS) 5 MG tablet Take 1 tablet by mouth Daily. 90 tablet 3     No current facility-administered medications for this visit.        Allergies:   No Known Allergies     Past Surgical History:  Past Surgical History:   Procedure Laterality Date    APPENDECTOMY      SKIN GRAFT         Social History:  Social History     Socioeconomic History    Marital status:    Tobacco Use    Smoking status: Never    Smokeless tobacco: Never   Vaping Use    Vaping status: Never Used   Substance and Sexual Activity    Alcohol use: Never    Drug use: Never    Sexual activity: Defer       Family History:  Family History   Problem Relation Age of Onset    No Known Problems Father     Cancer Mother        Review of Systems:  Review of Systems   Constitutional:  Negative for chills, fatigue, fever and unexpected weight change.   HENT:  Negative for congestion and sinus pressure.    Respiratory:  Negative for chest tightness and shortness of breath.    Cardiovascular:  Negative for chest pain.   Gastrointestinal:  Negative for abdominal pain, constipation, diarrhea, nausea and vomiting.   Genitourinary:  Positive for difficulty urinating and frequency. Negative for dysuria, flank pain, hematuria and urgency.   Musculoskeletal:  Negative for back pain and neck pain.   Skin:  Negative for rash.   Allergic/Immunologic: Negative for food allergies.   Neurological:  Negative for dizziness and headaches.   Hematological:  Bruises/bleeds easily.   Psychiatric/Behavioral:  Negative for confusion and suicidal ideas. The patient is not nervous/anxious.        Physical Exam:  Physical Exam  Constitutional:       General: He is not in acute distress.     Appearance: Normal appearance.   HENT:      Head: Normocephalic and atraumatic.      Nose: Nose normal.      Mouth/Throat:       Mouth: Mucous membranes are moist.   Eyes:      Conjunctiva/sclera: Conjunctivae normal.   Cardiovascular:      Rate and Rhythm: Normal rate and regular rhythm.      Pulses: Normal pulses.      Heart sounds: Normal heart sounds.   Pulmonary:      Effort: Pulmonary effort is normal.      Breath sounds: Normal breath sounds.   Abdominal:      General: Bowel sounds are normal.      Palpations: Abdomen is soft.   Musculoskeletal:         General: Normal range of motion.      Cervical back: Normal range of motion.   Skin:     General: Skin is warm.   Neurological:      General: No focal deficit present.      Mental Status: He is alert and oriented to person, place, and time.   Psychiatric:         Mood and Affect: Mood normal.         Behavior: Behavior normal.         Thought Content: Thought content normal.         Judgment: Judgment normal.         IPSS Questionnaire (AUA-7):  IPSS Questionnaire (AUA-7):                  IPSS Questionnaire (AUA-7):  Over the past month…    1)  Incomplete Emptying  How often have you had a sensation of not emptying your bladder?  0 - Not at all   2)  Frequency  How often have you had to urinate less than every two hours? 0 - Not at all   3)  Intermittency  How often have you found you stopped and started again several times when you urinated?  2 - Less than half the time   4) Urgency  How often have you found it difficult to postpone urination?  0 - Not at all   5) Weak Stream  How often have you had a weak urinary stream?  2 - Less than half the time   6) Straining  How often have you had to push or strain to begin urination?  1 - Less than 1 time in 5   7) Nocturia  How many times did you typically get up at night to urinate?  2 - 2 times   Total Score:  7   The International Prostate Symptom Score (IPSS) is used to screen, diagnose, track symptoms of benign prostatic hyperplasia (BPH).    0-7 pts (Mild Symptoms)  / 8-19 pts (Moderate) / 20-35 (Severe)    Quality of life due to  urinary symptoms:  If you were to spend the rest of your life with your urinary condition the way it is now, how would you feel about that? 2-Mostly Satisfied   Urine Leakage (Incontinence) 0-No Leakage       Recent Image (CT and/or KUB):   CT Abdomen and Pelvis: No results found for this or any previous visit.     CT Stone Protocol: No results found for this or any previous visit.     KUB: No results found for this or any previous visit.       Labs:  Brief Urine Lab Results       None          No visits with results within 3 Month(s) from this visit.   Latest known visit with results is:   Office Visit on 01/08/2024   Component Date Value Ref Range Status    PSA 01/08/2024 5.0 (H)  0.0 - 4.0 ng/mL Final    Comment: Roche ECLIA methodology.  According to the American Urological Association, Serum PSA should  decrease and remain at undetectable levels after radical  prostatectomy. The AUA defines biochemical recurrence as an initial  PSA value 0.2 ng/mL or greater followed by a subsequent confirmatory  PSA value 0.2 ng/mL or greater.  Values obtained with different assay methods or kits cannot be used  interchangeably. Results cannot be interpreted as absolute evidence  of the presence or absence of malignant disease.      PSA, Free 01/08/2024 1.26  N/A ng/mL Final    Roche ECLIA methodology.    PSA, Free % 01/08/2024 25.2  % Final    Comment: The table below lists the probability of prostate cancer for  men with non-suspicious PACO results and total PSA between  4 and 10 ng/mL, by patient age (Nina et al, RENNY 1998,  279:1542).                    % Free PSA       50-64 yr        65-75 yr                    0.00-10.00%        56%             55%                   10.01-15.00%        24%             35%                   15.01-20.00%        17%             23%                   20.01-25.00%        10%             20%                        >25.00%         5%              9%  Please note:  [facility]y did not  make specific                recommendations regarding the use of                percent free PSA for any other population                of men.          Procedure: None  Procedures     I have reviewed and agree with the above PMH, PSH, FMH, social history, medications, allergies, and labs.     Assessment/Plan:   Problem List Items Addressed This Visit          Other    [place] grade group 1 adenocarcinoma of prostate - Primary    Relevant Medications    tadalafil (CIALIS) 5 MG tablet    Other Relevant Orders    PSA Total+% Free (Serial)     Other Visit Diagnoses       Incomplete emptying of bladder        Relevant Medications    tadalafil (CIALIS) 5 MG tablet            Health Maintenance:   Health Maintenance Due   Topic Date Due    TDAP/TD VACCINES (1 - Tdap) Never done    ZOSTER VACCINE (1 of 2) Never done    RSV Vaccine - Adults (1 - 1-dose 60+ series) Never done    Pneumococcal Vaccine 65+ (1 of 1 - PCV) Never done    HEPATITIS C SCREENING  Never done    COVID-19 Vaccine (1 - 2023-24 season) Never done    ANNUAL WELLNESS VISIT  01/06/2024    BMI FOLLOWUP  01/18/2024        Smoking Counseling: Never smoked or use smokeless tobacco    Urine Incontinence: Patient reports that he is not currently experiencing any symptoms of urinary incontinence.    Patient was given instructions and counseling regarding his condition or for health maintenance advice. Please see specific information pulled into the AVS if appropriate.    Patient Education:   Prostate carcinoma -patient had positive biopsy results with 5% tissue concerning for grade group 1 prostate adenocarcinoma.  We did attempt to send this off for Oncotype and however was unable to perform due to insufficient amount of cancer.  Patient's most recent PSA was remaining stable with a high free percentage.  Given patient's continuation of lower urinary tract symptoms with no improvement on doxycycline or Flomax we will start the patient on trial of tadalafil 5 mg  once daily.  Discussed the risk and benefits of this medication in detail with the patient.  Vies him to discontinue if he begins to experience lightheadedness, dizziness, chest pain, shortness of breath, or syncope.  Advised patient to discontinue medication if he experience an erection lasting longer than 2 hours or when that becomes painful.  Advised him to seek urgent medical attention to the risk of for priapism.  Otherwise I will repeat a PSA in office today and call patient with results.  Will see him back in 3 months for reevaluation of lower urinary tract symptoms.    Visit Diagnoses:    ICD-10-CM ICD-9-CM   1. [place] grade group 1 adenocarcinoma of prostate  C61 185   2. Incomplete emptying of bladder  R33.9 788.21       Meds Ordered During Visit:  New Medications Ordered This Visit   Medications    tadalafil (CIALIS) 5 MG tablet     Sig: Take 1 tablet by mouth Daily.     Dispense:  90 tablet     Refill:  3       Follow Up Appointment: 3 months or sooner if needed  No follow-ups on file.      This document has been electronically signed by Dhruv Rider PA-C   April 11, 2024 14:43 EDT    Part of this note may be an electronic transcription/translation of spoken language to printed text using the Dragon Dictation System.

## 2024-04-12 ENCOUNTER — TELEPHONE (OUTPATIENT)
Dept: UROLOGY | Facility: CLINIC | Age: 79
End: 2024-04-12
Payer: MEDICARE

## 2024-04-12 LAB
PSA FREE MFR SERPL: 27.4 %
PSA FREE SERPL-MCNC: 0.93 NG/ML
PSA SERPL-MCNC: 3.4 NG/ML (ref 0–4)

## 2024-04-12 NOTE — TELEPHONE ENCOUNTER
Called patient to discuss PSA results.  Advised him PSA dropped a whole point and a half.  Recommend to keep 6-month follow-up with observation.  He verbalized understanding.

## 2024-05-22 ENCOUNTER — TELEPHONE (OUTPATIENT)
Dept: UROLOGY | Facility: CLINIC | Age: 79
End: 2024-05-22
Payer: MEDICARE

## 2024-05-22 DIAGNOSIS — C61 GLEASON GRADE GROUP 1 ADENOCARCINOMA OF PROSTATE: ICD-10-CM

## 2024-05-22 DIAGNOSIS — R33.9 INCOMPLETE EMPTYING OF BLADDER: ICD-10-CM

## 2024-05-22 RX ORDER — TADALAFIL 5 MG/1
5 TABLET ORAL DAILY
Qty: 90 TABLET | Refills: 3 | Status: SHIPPED | OUTPATIENT
Start: 2024-05-22

## 2024-05-22 NOTE — TELEPHONE ENCOUNTER
Patient came in to office with approval letter from [facility] for tadalafil 5 mg tablet. Authorization good un 12/31/24. He is asking that a prescription be sent to [facility] on [address]

## 2024-07-11 ENCOUNTER — OFFICE VISIT (OUTPATIENT)
Dept: UROLOGY | Facility: CLINIC | Age: 79
End: 2024-07-11
Payer: MEDICARE

## 2024-07-11 VITALS
DIASTOLIC BLOOD PRESSURE: 98 MMHG | SYSTOLIC BLOOD PRESSURE: 176 MMHG | WEIGHT: 200 LBS | HEART RATE: 73 BPM | BODY MASS INDEX: 30.31 KG/M2 | HEIGHT: 68 IN

## 2024-07-11 DIAGNOSIS — C61 GLEASON GRADE GROUP 1 ADENOCARCINOMA OF PROSTATE: Primary | ICD-10-CM

## 2024-07-11 PROBLEM — E78.2 MIXED HYPERLIPIDEMIA: Status: ACTIVE | Noted: 2017-07-13

## 2024-07-11 PROBLEM — I10 ESSENTIAL HYPERTENSION: Status: ACTIVE | Noted: 2024-07-11

## 2024-07-11 NOTE — PROGRESS NOTES
"Chief Complaint:    Chief Complaint   Patient presents with    Prostate Cancer     3 month       Vital Signs:   /98 (BP Location: Right arm, Patient Position: Sitting)   Pulse 73   Ht 172.7 cm (67.99\")   Wt 90.7 kg (200 lb)   BMI 30.42 kg/m²   Body mass index is 30.42 kg/m².      HPI:  Crow Zeng is a 78 y.o. male who presents today for follow up    History of Present Illness  Mr. Zeng presents to the clinic for follow-up for prostate carcinoma.  He was initially evaluated doing elevated PSA around 5 April 2022.  He had an MRI completed in February 2023 that showed a PI-RADS 4 lesion concerning for prostate carcinoma.  He underwent a biopsy shortly after that came back positive with a [place] grade group 1 adenocarcinoma the prostate involving only roughly 15% of prostatic pieces.  Due to low risk prostate carcinoma patient wishes to proceed forward with close observation.  He had a recent PSA taken in April 2023 that showed decrease in his overall PSA to 3.4 with a 27.4 free percentage.  He was also started on tadalafil 5 mg once daily for lower urinary tract symptoms at last office visit.  States he has had improvement with symptoms and gets up roughly only 1 time throughout the night.  He does endorse some frequency throughout the day but denies any difficulty urinating, dysuria, gross hematuria, intermittency, or split urinary stream.  He is overall pleased at this time and has no complaints.      Past Medical History:  Past Medical History:   Diagnosis Date    BPH with elevated PSA 6/9/2023    Elevated PSA     Hypertension        Current Meds:  Current Outpatient Medications   Medication Sig Dispense Refill    dilTIAZem CD (CARDIZEM CD) 180 MG 24 hr capsule Take 1 capsule by mouth Daily.      losartan (COZAAR) 100 MG tablet Take 1 tablet by mouth Daily.      pravastatin (PRAVACHOL) 20 MG tablet Take 1 tablet by mouth Daily.      predniSONE (DELTASONE) 2.5 MG tablet       tadalafil (CIALIS) 5 MG " tablet Take 1 tablet by mouth Daily. 90 tablet 3     No current facility-administered medications for this visit.        Allergies:   No Known Allergies     Past Surgical History:  Past Surgical History:   Procedure Laterality Date    APPENDECTOMY      SKIN GRAFT         Social History:  Social History     Socioeconomic History    Marital status:    Tobacco Use    Smoking status: Never    Smokeless tobacco: Never   Vaping Use    Vaping status: Never Used   Substance and Sexual Activity    Alcohol use: Never    Drug use: Never    Sexual activity: Defer       Family History:  Family History   Problem Relation Age of Onset    No Known Problems Father     Cancer Mother        Review of Systems:  Review of Systems   Constitutional:  Negative for chills, fatigue, fever and unexpected weight change.   Respiratory:  Negative for cough, chest tightness, shortness of breath and wheezing.    Cardiovascular:  Negative for chest pain and leg swelling.   Gastrointestinal:  Negative for abdominal pain, constipation, diarrhea, nausea and vomiting.   Genitourinary:  Negative for difficulty urinating, dysuria, frequency, hematuria, penile swelling, scrotal swelling, testicular pain and urgency.   Musculoskeletal:  Negative for back pain and joint swelling.   Skin:  Negative for rash.   Neurological:  Negative for dizziness and headaches.   Psychiatric/Behavioral:  Negative for confusion and suicidal ideas.        Physical Exam:  Physical Exam  Constitutional:       General: He is not in acute distress.     Appearance: Normal appearance.   HENT:      Head: Normocephalic and atraumatic.      Nose: Nose normal.      Mouth/Throat:      Mouth: Mucous membranes are moist.   Eyes:      Conjunctiva/sclera: Conjunctivae normal.   Cardiovascular:      Rate and Rhythm: Normal rate and regular rhythm.      Pulses: Normal pulses.      Heart sounds: Normal heart sounds.   Pulmonary:      Effort: Pulmonary effort is normal.      Breath  sounds: Normal breath sounds.   Abdominal:      General: Bowel sounds are normal.      Palpations: Abdomen is soft.   Musculoskeletal:         General: Normal range of motion.      Cervical back: Normal range of motion.   Skin:     General: Skin is warm.   Neurological:      General: No focal deficit present.      Mental Status: He is alert and oriented to person, place, and time.   Psychiatric:         Mood and Affect: Mood normal.         Behavior: Behavior normal.         Thought Content: Thought content normal.         Judgment: Judgment normal.             Recent Image (CT and/or KUB):   CT Abdomen and Pelvis: No results found for this or any previous visit.     CT Stone Protocol: No results found for this or any previous visit.     KUB: No results found for this or any previous visit.       Labs:  Brief Urine Lab Results       None          No visits with results within 3 Month(s) from this visit.   Latest known visit with results is:   Office Visit on 04/11/2024   Component Date Value Ref Range Status    PSA 04/11/2024 3.4  0.0 - 4.0 ng/mL Final    Comment: Roche ECLIA methodology.  According to the American Urological Association, Serum PSA should  decrease and remain at undetectable levels after radical  prostatectomy. The AUA defines biochemical recurrence as an initial  PSA value 0.2 ng/mL or greater followed by a subsequent confirmatory  PSA value 0.2 ng/mL or greater.  Values obtained with different assay methods or kits cannot be used  interchangeably. Results cannot be interpreted as absolute evidence  of the presence or absence of malignant disease.      PSA, Free 04/11/2024 0.93  N/A ng/mL Final    Roche ECLIA methodology.    PSA, Free % 04/11/2024 27.4  % Final    Comment: The table below lists the probability of prostate cancer for  men with non-suspicious PACO results and total PSA between  4 and 10 ng/mL, by patient age (Nina et al, RENNY 1998,  279:1542).                    % Free PSA        50-64 yr        65-75 yr                    0.00-10.00%        56%             55%                   10.01-15.00%        24%             35%                   15.01-20.00%        17%             23%                   20.01-25.00%        10%             20%                        >25.00%         5%              9%  Please note:  Nina et al did not make specific                recommendations regarding the use of                percent free PSA for any other population                of men.          Procedure: None  Procedures     I have reviewed and agree with the above PMH, PSH, FMH, social history, medications, allergies, and labs.     Assessment/Plan:   Problem List Items Addressed This Visit       [place] grade group 1 adenocarcinoma of prostate - Primary       Health Maintenance:   Health Maintenance Due   Topic Date Due    LIPID PANEL  Never done    TDAP/TD VACCINES (1 - Tdap) Never done    ZOSTER VACCINE (1 of 2) Never done    RSV Vaccine - Adults (1 - 1-dose 60+ series) Never done    Pneumococcal Vaccine 65+ (1 of 1 - PCV) Never done    HEPATITIS C SCREENING  Never done    ANNUAL WELLNESS VISIT  Never done    COVID-19 Vaccine (1 - 2023-24 season) Never done    BMI FOLLOWUP  01/18/2024        Smoking Counseling: Never smoked.  Never used smokeless tobacco.    Urine Incontinence: Patient reports that he is not currently experiencing any symptoms of urinary incontinence.    Patient was given instructions and counseling regarding his condition or for health maintenance advice. Please see specific information pulled into the AVS if appropriate.    Patient Education:   [place] grade group 1 adenocarcinoma the prostate -patient's most recent PSA showed an overall decreased to 3.4 and recommend to continue with close observation at this time.  Will continue with tadalafil 5 mg daily for lower urinary tract symptoms since patient has had improvement.  Will repeat a PSA in 3 months had a close follow-up visit.   Advised patient return to clinic sooner if he begins to experience hematospermia, hematuria, fever, chills, unexplained weight loss, or any changes in lower urinary tract symptoms.  Patient verbalized understanding.    Visit Diagnoses:    ICD-10-CM ICD-9-CM   1. [place] grade group 1 adenocarcinoma of prostate  C61 185     A total of 15 minutes were spent coordinating this patient’s care in clinic today; 10 minutes of which were face-to-face with the patient, reviewing medical history and counseling on the current treatment and followup plan.  All questions were answered to patient's satisfaction.    Meds Ordered During Visit:  No orders of the defined types were placed in this encounter.      Follow Up Appointment: 3 months  No follow-ups on file.      This document has been electronically signed by Dhruv Rider PA-C   July 11, 2024 13:00 EDT    Part of this note may be an electronic transcription/translation of spoken language to printed text using the Dragon Dictation System.

## 2024-10-09 ENCOUNTER — OFFICE VISIT (OUTPATIENT)
Dept: UROLOGY | Facility: CLINIC | Age: 79
End: 2024-10-09
Payer: MEDICARE

## 2024-10-09 VITALS
DIASTOLIC BLOOD PRESSURE: 89 MMHG | SYSTOLIC BLOOD PRESSURE: 148 MMHG | WEIGHT: 199 LBS | HEART RATE: 70 BPM | HEIGHT: 68 IN | BODY MASS INDEX: 30.16 KG/M2 | RESPIRATION RATE: 18 BRPM

## 2024-10-09 DIAGNOSIS — C61 GLEASON GRADE GROUP 1 ADENOCARCINOMA OF PROSTATE: Primary | ICD-10-CM

## 2024-10-09 DIAGNOSIS — N32.81 OVERACTIVE BLADDER: ICD-10-CM

## 2024-10-09 PROCEDURE — 84153 ASSAY OF PSA TOTAL: CPT

## 2024-10-09 PROCEDURE — 84154 ASSAY OF PSA FREE: CPT

## 2024-10-09 NOTE — PROGRESS NOTES
"Chief Complaint:    Chief Complaint   Patient presents with    [place] grade group 1 adenocarcinoma of prostat       Vital Signs:   /89 (BP Location: Left arm, Patient Position: Sitting, Cuff Size: Adult)   Pulse 70   Resp 18   Ht 172.7 cm (67.99\")   Wt 90.3 kg (199 lb)   BMI 30.27 kg/m²   Body mass index is 30.27 kg/m².      HPI:  Crow Zeng is a 79 y.o. male who presents today for follow up    History of Present Illness  Mr. Hobson returns to the clinic for follow-up for prostate carcinoma.  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  He has been started on tadalafil 5 mg previously with improvement in urinary stream.  He returns today with some plaints of frequency, urgency's with mild incontinence, and intermittent stream.  States the tadalafil still helps with his weak stream and nocturia.  He does endorse getting up 2-3 times throughout the night.  He was last seen in April and had a PSA taken at that time that came back great at 3.2 with a greater than 20% free percentage.  He is interested in starting a medication to help with his frequency, urgency, and urge incontinence.      Past Medical History:  Past Medical History:   Diagnosis Date    BPH with elevated PSA 6/9/2023    Elevated PSA     Hypertension        Current Meds:  Current Outpatient Medications   Medication Sig Dispense Refill    dilTIAZem CD (CARDIZEM CD) 180 MG 24 hr capsule Take 1 capsule by mouth Daily.      losartan (COZAAR) 100 MG tablet Take 1 tablet by mouth Daily.      pravastatin (PRAVACHOL) 20 MG tablet Take 1 tablet by mouth Daily.      predniSONE (DELTASONE) 2.5 MG tablet       tadalafil (CIALIS) 5 MG tablet Take 1 tablet by mouth Daily. 90 tablet 3    Vibegron 75 MG tablet Take 1 tablet by mouth Every Night. 42 tablet 0     No current facility-administered medications for this visit.        Allergies:   No Known Allergies     Past Surgical History:  Past Surgical History: "   Procedure Laterality Date    APPENDECTOMY      SKIN GRAFT         Social History:  Social History     Socioeconomic History    Marital status:    Tobacco Use    Smoking status: Never    Smokeless tobacco: Never   Vaping Use    Vaping status: Never Used   Substance and Sexual Activity    Alcohol use: Never    Drug use: Never    Sexual activity: Defer       Family History:  Family History   Problem Relation Age of Onset    No Known Problems Father     Cancer Mother        Review of Systems:  Review of Systems   Constitutional:  Negative for fatigue, fever and unexpected weight change.   Respiratory:  Negative for chest tightness and shortness of breath.    Cardiovascular:  Negative for chest pain.   Gastrointestinal:  Negative for abdominal pain, constipation, diarrhea, nausea and vomiting.   Genitourinary:  Negative for difficulty urinating, dysuria, frequency and urgency.   Skin:  Negative for rash.   Psychiatric/Behavioral:  Negative for confusion and suicidal ideas.        Physical Exam:  Physical Exam  Constitutional:       General: He is not in acute distress.     Appearance: Normal appearance.   HENT:      Head: Normocephalic and atraumatic.      Nose: Nose normal.      Mouth/Throat:      Mouth: Mucous membranes are moist.   Eyes:      Conjunctiva/sclera: Conjunctivae normal.   Cardiovascular:      Rate and Rhythm: Normal rate and regular rhythm.      Pulses: Normal pulses.      Heart sounds: Normal heart sounds.   Pulmonary:      Effort: Pulmonary effort is normal.      Breath sounds: Normal breath sounds.   Abdominal:      General: Bowel sounds are normal.      Palpations: Abdomen is soft.   Musculoskeletal:         General: Normal range of motion.      Cervical back: Normal range of motion.   Skin:     General: Skin is warm.   Neurological:      General: No focal deficit present.      Mental Status: He is alert and oriented to person, place, and time.   Psychiatric:         Mood and Affect: Mood  normal.         Behavior: Behavior normal.         Thought Content: Thought content normal.         Judgment: Judgment normal.           Recent Image (CT and/or KUB):   CT Abdomen and Pelvis: No results found for this or any previous visit.     CT Stone Protocol: No results found for this or any previous visit.     KUB: No results found for this or any previous visit.       Labs:  Brief Urine Lab Results       None          No visits with results within 3 Month(s) from this visit.   Latest known visit with results is:   Office Visit on 04/11/2024   Component Date Value Ref Range Status    PSA 04/11/2024 3.4  0.0 - 4.0 ng/mL Final    Comment: Roche ECLIA methodology.  According to the American Urological Association, Serum PSA should  decrease and remain at undetectable levels after radical  prostatectomy. The AUA defines biochemical recurrence as an initial  PSA value 0.2 ng/mL or greater followed by a subsequent confirmatory  PSA value 0.2 ng/mL or greater.  Values obtained with different assay methods or kits cannot be used  interchangeably. Results cannot be interpreted as absolute evidence  of the presence or absence of malignant disease.      PSA, Free 04/11/2024 0.93  N/A ng/mL Final    Roche ECLIA methodology.    PSA, Free % 04/11/2024 27.4  % Final    Comment: The table below lists the probability of prostate cancer for  men with non-suspicious PACO results and total PSA between  4 and 10 ng/mL, by patient age (Nina et al, RENNY 1998,  [id number]).                    % Free PSA       50-64 yr        65-75 yr                    0.00-10.00%        56%             55%                   10.01-15.00%        24%             35%                   15.01-20.00%        17%             23%                   20.01-25.00%        10%             20%                        >25.00%         5%              9%  Please note:  Nina et al did not make specific                recommendations regarding the use of                 percent free PSA for any other population                of men.          Procedure: None  Procedures     I have reviewed and agree with the above PMH, PSH, FMH, social history, medications, allergies, and labs.     Assessment/Plan:   Problem List Items Addressed This Visit       [place] grade group 1 adenocarcinoma of prostate - Primary    Relevant Orders    PSA Total+% Free (Serial)     Other Visit Diagnoses       Overactive bladder        Relevant Medications    Vibegron 75 MG tablet            Health Maintenance:   Health Maintenance Due   Topic Date Due    LIPID PANEL  Never done    TDAP/TD VACCINES (1 - Tdap) Never done    ZOSTER VACCINE (1 of 2) Never done    RSV Vaccine - Adults (1 - 1-dose 60+ series) Never done    Pneumococcal Vaccine 65+ (1 of 1 - PCV) Never done    ANNUAL WELLNESS VISIT  Never done    BMI FOLLOWUP  01/18/2024    INFLUENZA VACCINE  Never done    COVID-19 Vaccine (1 - 2023-24 season) Never done        Smoking Counseling: Never smoked.  Never used smokeless tobacco.    Urine Incontinence: Patient reports that he is experiencing mild urge incontinence.    Patient was given instructions and counseling regarding his condition or for health maintenance advice. Please see specific information pulled into the AVS if appropriate.    Patient Education:   Adenocarcinoma the prostate -discussed with the patient the pathophysiology of this condition in detail.  Did rediscussed with the patient his low grade grade group 1 prostate carcinoma on pathology report.  Given patient's current age and symptomology we will continue with observation and repeat a PSA free and total in office today to identify any significant increase or elevation.  Last PSA was unremarkable.  Will continue with tadalafil 5 mg once daily.  Overactive bladder -discussed with the patient the pathophysiology of this condition in detail.  Discussed causes which can include but not limited to detrusor instability, acute urinary tract  infection, prostatitis, bladder tumor, nephrolithiasis, no other urological abnormalities.  Patient does wish to try medications at this time to help with lower urinary tract symptoms we will start him on a trial of Gemtesa 75 mg once nightly.  Discussed the risk and benefits of this medication in detail with the patient.  Advised to discontinue medication if begins experiencing increasing difficulty urinating, decreased urinary output, or inability urinate.  Otherwise we will place him back in 6 months for repeat PSA    Visit Diagnoses:    ICD-10-CM ICD-9-CM   1. [place] grade group 1 adenocarcinoma of prostate  C61 185   2. Overactive bladder  N32.81 596.51     A total of 20 minutes were spent coordinating this patient’s care in clinic today; 12 minutes of which were face-to-face with the patient, reviewing medical history and counseling on the current treatment and followup plan.  All questions were answered to patient's satisfaction.    Meds Ordered During Visit:  New Medications Ordered This Visit   Medications    Vibegron 75 MG tablet     Sig: Take 1 tablet by mouth Every Night.     Dispense:  42 tablet     Refill:  0       Follow Up Appointment: 6 months  No follow-ups on file.      This document has been electronically signed by Dhruv Rider PA-C   October 9, 2024 13:24 EDT    Part of this note may be an electronic transcription/translation of spoken language to printed text using the Dragon Dictation System.

## 2024-10-11 ENCOUNTER — TELEPHONE (OUTPATIENT)
Dept: UROLOGY | Facility: CLINIC | Age: 79
End: 2024-10-11
Payer: MEDICARE

## 2024-10-11 LAB
PSA FREE MFR SERPL: 24.5 %
PSA FREE SERPL-MCNC: 1.25 NG/ML
PSA SERPL-MCNC: 5.1 NG/ML (ref 0–4)

## 2024-10-11 NOTE — TELEPHONE ENCOUNTER
Called patient advised him PSA did slightly increase again to 5 however remained stable from prior PSAs.  Free percentage was good and recommend to continue with observation.  Vies patient if symptoms worsen to call back and we will repeat a PSA at next office visit.  He verbalized understanding

## 2025-01-28 ENCOUNTER — TELEPHONE (OUTPATIENT)
Dept: UROLOGY | Facility: CLINIC | Age: 80
End: 2025-01-28
Payer: MEDICARE

## 2025-01-29 ENCOUNTER — TELEPHONE (OUTPATIENT)
Dept: UROLOGY | Facility: CLINIC | Age: 80
End: 2025-01-29
Payer: MEDICARE

## 2025-01-29 NOTE — TELEPHONE ENCOUNTER
PA for Cialis has been sent to pt's insurance company and waiting for a response back.                 Sent to Plan today  Drug  Cialis 5MG tablets  ePA cloud logo  Form  [employer] Electronic PA Form

## 2025-02-19 ENCOUNTER — TELEPHONE (OUTPATIENT)
Dept: UROLOGY | Facility: CLINIC | Age: 80
End: 2025-02-19
Payer: MEDICARE

## 2025-02-19 NOTE — TELEPHONE ENCOUNTER
The patient brought in denial paper work on his tadalafil and he would like us to fill them out to appeal the denial. Looks like we already have them in his chart.

## 2025-02-20 ENCOUNTER — TELEPHONE (OUTPATIENT)
Dept: UROLOGY | Facility: CLINIC | Age: 80
End: 2025-02-20
Payer: MEDICARE

## 2025-02-20 NOTE — TELEPHONE ENCOUNTER
I called the pt letting him know that his tadalafil has been denied. I let him know that his insurance would not pay for the prescription, but he could get it filled at his pharmacy for $6.56. The pt verbalized understanding.

## 2025-04-09 ENCOUNTER — OFFICE VISIT (OUTPATIENT)
Dept: UROLOGY | Facility: CLINIC | Age: 80
End: 2025-04-09
Payer: MEDICARE

## 2025-04-09 VITALS
HEART RATE: 80 BPM | WEIGHT: 199.4 LBS | BODY MASS INDEX: 30.22 KG/M2 | HEIGHT: 68 IN | SYSTOLIC BLOOD PRESSURE: 163 MMHG | DIASTOLIC BLOOD PRESSURE: 94 MMHG

## 2025-04-09 DIAGNOSIS — N40.0 PROSTATE ENLARGEMENT: ICD-10-CM

## 2025-04-09 DIAGNOSIS — R33.9 INCOMPLETE EMPTYING OF BLADDER: ICD-10-CM

## 2025-04-09 DIAGNOSIS — C61 GLEASON GRADE GROUP 1 ADENOCARCINOMA OF PROSTATE: Primary | ICD-10-CM

## 2025-04-09 RX ORDER — TADALAFIL 5 MG/1
5 TABLET ORAL DAILY
Qty: 90 TABLET | Refills: 3 | Status: SHIPPED | OUTPATIENT
Start: 2025-04-09

## 2025-04-09 NOTE — PROGRESS NOTES
"Chief Complaint:    Chief Complaint   Patient presents with    Prostate Cancer     6 month follow up        Vital Signs:   /94 (BP Location: Right arm, Patient Position: Sitting, Cuff Size: Large Adult)   Pulse 80   Ht 172.7 cm (67.99\")   Wt 90.4 kg (199 lb 6.4 oz)   BMI 30.33 kg/m²   Body mass index is 30.33 kg/m².      HPI:  Crow Zeng is a 79 y.o. male who presents today for follow up    History of Present Illness  Mr. Hobson returns to the clinic for follow-up for prostate carcinoma.  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  He has been started on tadalafil 5 mg previously with improvement in urinary stream.  He returns today with some plaints of frequency, urgency's with mild incontinence, and intermittent stream.  States the tadalafil still helps with his weak stream and nocturia.  He does endorse getting up 2-3 times throughout the night.  His PSA at last office visit remains grossly stable at 5.1.  We were unable to obtain Oncotype due to limited specimen volume.  He does not wish to seek aggressive treatment at this time.      Past Medical History:  Past Medical History:   Diagnosis Date    BPH with elevated PSA 6/9/2023    Elevated PSA     Hypertension        Current Meds:  Current Outpatient Medications   Medication Sig Dispense Refill    dilTIAZem CD (CARDIZEM CD) 180 MG 24 hr capsule Take 1 capsule by mouth Daily.      losartan (COZAAR) 100 MG tablet Take 1 tablet by mouth Daily.      pravastatin (PRAVACHOL) 20 MG tablet Take 1 tablet by mouth Daily.      predniSONE (DELTASONE) 2.5 MG tablet       tadalafil (CIALIS) 5 MG tablet Take 1 tablet by mouth Daily. 90 tablet 3     No current facility-administered medications for this visit.        Allergies:   No Known Allergies     Past Surgical History:  Past Surgical History:   Procedure Laterality Date    APPENDECTOMY      SKIN GRAFT         Social History:  Social History     Socioeconomic History    " Marital status:    Tobacco Use    Smoking status: Never    Smokeless tobacco: Never   Vaping Use    Vaping status: Never Used   Substance and Sexual Activity    Alcohol use: Never    Drug use: Never    Sexual activity: Defer       Family History:  Family History   Problem Relation Age of Onset    No Known Problems Father     Cancer Mother        Review of Systems:  Review of Systems   Constitutional:  Positive for fatigue. Negative for chills, fever and unexpected weight change.   HENT:  Negative for congestion and sinus pressure.    Respiratory:  Negative for chest tightness and shortness of breath.    Cardiovascular:  Negative for chest pain.   Gastrointestinal:  Positive for constipation. Negative for abdominal pain, diarrhea, nausea and vomiting.   Genitourinary:  Positive for frequency and urgency. Negative for difficulty urinating, dysuria, flank pain and hematuria.   Musculoskeletal:  Negative for back pain and neck pain.   Skin:  Negative for rash.   Neurological:  Negative for dizziness and headaches.   Hematological:  Bruises/bleeds easily.   Psychiatric/Behavioral:  Negative for confusion and suicidal ideas. The patient is not nervous/anxious.        Physical Exam:  Physical Exam  Constitutional:       General: He is not in acute distress.     Appearance: Normal appearance.   HENT:      Head: Normocephalic and atraumatic.      Nose: Nose normal.      Mouth/Throat:      Mouth: Mucous membranes are moist.   Eyes:      Conjunctiva/sclera: Conjunctivae normal.   Cardiovascular:      Rate and Rhythm: Normal rate.      Pulses: Normal pulses.   Pulmonary:      Effort: Pulmonary effort is normal.   Abdominal:      Palpations: Abdomen is soft.   Musculoskeletal:         General: Normal range of motion.      Cervical back: Normal range of motion.   Skin:     General: Skin is warm.   Neurological:      General: No focal deficit present.      Mental Status: He is alert and oriented to person, place, and time.    Psychiatric:         Mood and Affect: Mood normal.         Behavior: Behavior normal.         Thought Content: Thought content normal.         Judgment: Judgment normal.         IPSS Questionnaire (AUA-7):  IPSS Questionnaire (AUA-7):                  IPSS Questionnaire (AUA-7):  Over the past month…    1)  Incomplete Emptying  How often have you had a sensation of not emptying your bladder?  1 - Less than 1 time in 5   2)  Frequency  How often have you had to urinate less than every two hours? 1 - Less than 1 time in 5   3)  Intermittency  How often have you found you stopped and started again several times when you urinated?  2 - Less than half the time   4) Urgency  How often have you found it difficult to postpone urination?  2 - Less than half the time   5) Weak Stream  How often have you had a weak urinary stream?  3 - About half the time   6) Straining  How often have you had to push or strain to begin urination?  2 - Less than half the time   7) Nocturia  How many times did you typically get up at night to urinate?  2 - 2 times   Total Score:  13   The International Prostate Symptom Score (IPSS) is used to screen, diagnose, track symptoms of benign prostatic hyperplasia (BPH).    0-7 pts (Mild Symptoms)  / 8-19 pts (Moderate) / 20-35 (Severe)    Quality of life due to urinary symptoms:  If you were to spend the rest of your life with your urinary condition the way it is now, how would you feel about that? 3-Mixed   Urine Leakage (Incontinence) 1-Mild (A few drops a day, no pad use)       Recent Image (CT and/or KUB):   CT Abdomen and Pelvis: No results found for this or any previous visit.     CT Stone Protocol: No results found for this or any previous visit.     KUB: No results found for this or any previous visit.       Labs:  Brief Urine Lab Results       None          Office Visit on 04/09/2025   Component Date Value Ref Range Status    PSA 04/09/2025 4.860 (H)  0.000 - 4.000 ng/mL Final    Comment:  Testing Method: [employer] Electrochemiluminescence  Immunoassay(ECLIA)  Values obtained with different assay methods or kits cannot  be used interchangeably.          Procedure: None  Procedures     I have reviewed and agree with the above PMH, PSH, FMH, social history, medications, allergies, and labs.     Assessment/Plan:   Problem List Items Addressed This Visit       Jason grade group 1 adenocarcinoma of prostate - Primary    Relevant Medications    tadalafil (CIALIS) 5 MG tablet    Other Relevant Orders    PSA Diagnostic (Completed)     Other Visit Diagnoses         Incomplete emptying of bladder        Relevant Medications    tadalafil (CIALIS) 5 MG tablet      Prostate enlargement        Relevant Medications    tadalafil (CIALIS) 5 MG tablet            Health Maintenance:   Health Maintenance Due   Topic Date Due    LIPID PANEL  Never done    TDAP/TD VACCINES (1 - Tdap) Never done    Pneumococcal Vaccine 50+ (1 of 1 - PCV) Never done    ZOSTER VACCINE (1 of 2) Never done    RSV Vaccine - Adults (1 - 1-dose 75+ series) Never done    ANNUAL WELLNESS VISIT  Never done    COVID-19 Vaccine (1 - 2024-25 season) Never done        Smoking Counseling: Never smoked.  Never used smokeless tobacco.    Urine Incontinence: Patient reports that he is having some mild stress incontinence.    Patient was given instructions and counseling regarding his condition or for health maintenance advice. Please see specific information pulled into the AVS if appropriate.    Patient Education:   Grade group 1 adenocarcinoma the prostate -discussed with the patient the pathophysiology of this condition in detail.  Patient has had an Oncotype that shows a low risk concerning carcinoma and he does wish to proceed forward with close observation.  Did discuss the risk of delayed treatment of prostate cancer which could lead to metastatic disease.  Did discuss the use of other medications such as androgen deprivation  therapy/chemotherapy tablets.  Discussed the risk of these medications in detail.  Will repeat a PSA in office today and call him with results once available.  Incomplete emptying/prostatic enlargement -patient is doing well on tadalafil 5 mg once a day and will continue with medications as prescribed.  Did discuss the risk of PDE 5 inhibitors in detail with the patient.  Positive return to the clinic if he notices any changes in his lower urinary tract symptoms.  Otherwise we will place him back in 6 months for repeat PSA    Visit Diagnoses:    ICD-10-CM ICD-9-CM   1. Jason grade group 1 adenocarcinoma of prostate  C61 185   2. Incomplete emptying of bladder  R33.9 788.21   3. Prostate enlargement  N40.0 600.00     A total of 30 minutes were spent coordinating this patient’s care in clinic today; 20 minutes of which were face-to-face with the patient, reviewing medical history and counseling on the current treatment and followup plan.  All questions were answered to patient's satisfaction.    Meds Ordered During Visit:  New Medications Ordered This Visit   Medications    tadalafil (CIALIS) 5 MG tablet     Sig: Take 1 tablet by mouth Daily.     Dispense:  90 tablet     Refill:  3       Follow Up Appointment: 6 months pending PSA  No follow-ups on file.      This document has been electronically signed by Dhruv Rider PA-C   April 10, 2025 09:08 EDT    Part of this note may be an electronic transcription/translation of spoken language to printed text using the Dragon Dictation System.

## 2025-04-10 ENCOUNTER — RESULTS FOLLOW-UP (OUTPATIENT)
Dept: UROLOGY | Facility: CLINIC | Age: 80
End: 2025-04-10
Payer: MEDICARE

## 2025-04-10 LAB — PSA SERPL-MCNC: 4.86 NG/ML (ref 0–4)

## 2025-04-10 NOTE — TELEPHONE ENCOUNTER
I called to discuss PSA and advised patient to keep follow up in 6 months. He verbalized understanding.

## 2025-04-16 ENCOUNTER — TELEPHONE (OUTPATIENT)
Dept: UROLOGY | Facility: CLINIC | Age: 80
End: 2025-04-16
Payer: MEDICARE

## 2025-04-16 NOTE — TELEPHONE ENCOUNTER
PA for Tadalafil has been sent to pt's insurance company and APPROVED!!                Approved today by [employer] PITTMAN 2017  PA Case: [id number], Status: Approved, Coverage Starts on: 1/1/2025 12:00:00 AM, Coverage Ends on: 12/31/2025 12:00:00 AM. Questions? Contact [phone].  Effective Date: 1/1/2025  Authorization Expiration Date: 12/31/2025  Drug  Tadalafil 5MG tablets  ePA cloud logo  Form  [employer] Form

## 2025-07-25 ENCOUNTER — HOSPITAL ENCOUNTER (INPATIENT)
Facility: HOSPITAL | Age: 80
LOS: 6 days | Discharge: HOME | End: 2025-07-31
Admitting: STUDENT IN AN ORGANIZED HEALTH CARE EDUCATION/TRAINING PROGRAM
Payer: MEDICARE

## 2025-07-25 ENCOUNTER — APPOINTMENT (OUTPATIENT)
Dept: GENERAL RADIOLOGY | Facility: HOSPITAL | Age: 80
End: 2025-07-25
Payer: MEDICARE

## 2025-07-25 ENCOUNTER — APPOINTMENT (OUTPATIENT)
Dept: CT IMAGING | Facility: HOSPITAL | Age: 80
End: 2025-07-25
Payer: MEDICARE

## 2025-07-25 DIAGNOSIS — I48.91 ATRIAL FIBRILLATION WITH RVR: Primary | ICD-10-CM

## 2025-07-25 DIAGNOSIS — K56.690 OTHER PARTIAL INTESTINAL OBSTRUCTION: ICD-10-CM

## 2025-07-25 DIAGNOSIS — Z90.49 STATUS POST LAPAROSCOPIC CHOLECYSTECTOMY: ICD-10-CM

## 2025-07-25 DIAGNOSIS — K80.20 GALLSTONES: ICD-10-CM

## 2025-07-25 LAB
ALBUMIN SERPL-MCNC: 4.2 G/DL (ref 3.5–5.2)
ALBUMIN/GLOB SERPL: 1.6 G/DL
ALP SERPL-CCNC: 87 U/L (ref 39–117)
ALT SERPL W P-5'-P-CCNC: 10 U/L (ref 1–41)
ANION GAP SERPL CALCULATED.3IONS-SCNC: 13.8 MMOL/L (ref 5–15)
APTT PPP: 29 SECONDS (ref 24.5–35.9)
AST SERPL-CCNC: 20 U/L (ref 1–40)
BASOPHILS # BLD AUTO: 0.02 10*3/MM3 (ref 0–0.2)
BASOPHILS # BLD AUTO: 0.04 10*3/MM3 (ref 0–0.2)
BASOPHILS NFR BLD AUTO: 0.2 % (ref 0–1.5)
BASOPHILS NFR BLD AUTO: 0.4 % (ref 0–1.5)
BILIRUB SERPL-MCNC: 0.9 MG/DL (ref 0–1.2)
BILIRUB UR QL STRIP: NEGATIVE
BUN SERPL-MCNC: 25.7 MG/DL (ref 8–23)
BUN/CREAT SERPL: 17.4 (ref 7–25)
CALCIUM SPEC-SCNC: 8.9 MG/DL (ref 8.6–10.5)
CHLORIDE SERPL-SCNC: 102 MMOL/L (ref 98–107)
CHOLEST SERPL-MCNC: 125 MG/DL (ref 0–200)
CK SERPL-CCNC: 134 U/L (ref 20–200)
CLARITY UR: CLEAR
CO2 SERPL-SCNC: 20.2 MMOL/L (ref 22–29)
COLOR UR: YELLOW
CREAT SERPL-MCNC: 1.48 MG/DL (ref 0.76–1.27)
D-LACTATE SERPL-SCNC: 1.4 MMOL/L (ref 0.5–2)
DEPRECATED RDW RBC AUTO: 50.8 FL (ref 37–54)
DEPRECATED RDW RBC AUTO: 52.7 FL (ref 37–54)
EGFRCR SERPLBLD CKD-EPI 2021: 47.5 ML/MIN/1.73
EOSINOPHIL # BLD AUTO: 0.02 10*3/MM3 (ref 0–0.4)
EOSINOPHIL # BLD AUTO: 0.1 10*3/MM3 (ref 0–0.4)
EOSINOPHIL NFR BLD AUTO: 0.2 % (ref 0.3–6.2)
EOSINOPHIL NFR BLD AUTO: 1.1 % (ref 0.3–6.2)
ERYTHROCYTE [DISTWIDTH] IN BLOOD BY AUTOMATED COUNT: 14.6 % (ref 12.3–15.4)
ERYTHROCYTE [DISTWIDTH] IN BLOOD BY AUTOMATED COUNT: 14.6 % (ref 12.3–15.4)
GEN 5 1HR TROPONIN T REFLEX: 17 NG/L
GLOBULIN UR ELPH-MCNC: 2.7 GM/DL
GLUCOSE SERPL-MCNC: 112 MG/DL (ref 65–99)
GLUCOSE UR STRIP-MCNC: NEGATIVE MG/DL
HBA1C MFR BLD: 6.06 % (ref 4.8–5.6)
HCT VFR BLD AUTO: 43.3 % (ref 37.5–51)
HCT VFR BLD AUTO: 44.8 % (ref 37.5–51)
HDLC SERPL-MCNC: 38 MG/DL (ref 40–60)
HGB BLD-MCNC: 13.4 G/DL (ref 13–17.7)
HGB BLD-MCNC: 14.5 G/DL (ref 13–17.7)
HGB UR QL STRIP.AUTO: NEGATIVE
IMM GRANULOCYTES # BLD AUTO: 0.04 10*3/MM3 (ref 0–0.05)
IMM GRANULOCYTES # BLD AUTO: 0.04 10*3/MM3 (ref 0–0.05)
IMM GRANULOCYTES NFR BLD AUTO: 0.4 % (ref 0–0.5)
IMM GRANULOCYTES NFR BLD AUTO: 0.4 % (ref 0–0.5)
INR PPP: 1.05 (ref 0.9–1.1)
INR PPP: 1.12 (ref 0.9–1.1)
KETONES UR QL STRIP: ABNORMAL
LDLC SERPL CALC-MCNC: 68 MG/DL (ref 0–100)
LDLC/HDLC SERPL: 1.75 {RATIO}
LEUKOCYTE ESTERASE UR QL STRIP.AUTO: NEGATIVE
LYMPHOCYTES # BLD AUTO: 1.55 10*3/MM3 (ref 0.7–3.1)
LYMPHOCYTES # BLD AUTO: 1.98 10*3/MM3 (ref 0.7–3.1)
LYMPHOCYTES NFR BLD AUTO: 16.1 % (ref 19.6–45.3)
LYMPHOCYTES NFR BLD AUTO: 21.7 % (ref 19.6–45.3)
MAGNESIUM SERPL-MCNC: 2.1 MG/DL (ref 1.6–2.4)
MCH RBC QN AUTO: 30.2 PG (ref 26.6–33)
MCH RBC QN AUTO: 30.5 PG (ref 26.6–33)
MCHC RBC AUTO-ENTMCNC: 30.9 G/DL (ref 31.5–35.7)
MCHC RBC AUTO-ENTMCNC: 32.4 G/DL (ref 31.5–35.7)
MCV RBC AUTO: 94.1 FL (ref 79–97)
MCV RBC AUTO: 97.7 FL (ref 79–97)
MONOCYTES # BLD AUTO: 1.04 10*3/MM3 (ref 0.1–0.9)
MONOCYTES # BLD AUTO: 1.13 10*3/MM3 (ref 0.1–0.9)
MONOCYTES NFR BLD AUTO: 10.8 % (ref 5–12)
MONOCYTES NFR BLD AUTO: 12.4 % (ref 5–12)
NEUTROPHILS NFR BLD AUTO: 5.84 10*3/MM3 (ref 1.7–7)
NEUTROPHILS NFR BLD AUTO: 6.97 10*3/MM3 (ref 1.7–7)
NEUTROPHILS NFR BLD AUTO: 64 % (ref 42.7–76)
NEUTROPHILS NFR BLD AUTO: 72.3 % (ref 42.7–76)
NITRITE UR QL STRIP: NEGATIVE
NRBC BLD AUTO-RTO: 0 /100 WBC (ref 0–0.2)
NRBC BLD AUTO-RTO: 0 /100 WBC (ref 0–0.2)
PH UR STRIP.AUTO: 5.5 [PH] (ref 5–8)
PLATELET # BLD AUTO: 224 10*3/MM3 (ref 140–450)
PLATELET # BLD AUTO: 297 10*3/MM3 (ref 140–450)
PMV BLD AUTO: 9.5 FL (ref 6–12)
PMV BLD AUTO: 9.9 FL (ref 6–12)
POTASSIUM SERPL-SCNC: 4.3 MMOL/L (ref 3.5–5.2)
PROT SERPL-MCNC: 6.9 G/DL (ref 6–8.5)
PROT UR QL STRIP: NEGATIVE
PROTHROMBIN TIME: 14.3 SECONDS (ref 12.5–15.2)
PROTHROMBIN TIME: 15.1 SECONDS (ref 12.5–15.2)
QT INTERVAL: 282 MS
QTC INTERVAL: 396 MS
RBC # BLD AUTO: 4.43 10*6/MM3 (ref 4.14–5.8)
RBC # BLD AUTO: 4.76 10*6/MM3 (ref 4.14–5.8)
SODIUM SERPL-SCNC: 136 MMOL/L (ref 136–145)
SP GR UR STRIP: 1.02 (ref 1–1.03)
TRIGL SERPL-MCNC: 102 MG/DL (ref 0–150)
TROPONIN T % DELTA: -23
TROPONIN T NUMERIC DELTA: -5 NG/L
TROPONIN T SERPL HS-MCNC: 22 NG/L
TSH SERPL DL<=0.05 MIU/L-ACNC: 1.33 UIU/ML (ref 0.27–4.2)
UFH PPP CHRO-ACNC: 0.18 IU/ML (ref 0.3–0.7)
UFH PPP CHRO-ACNC: <0.1 IU/ML (ref 0.3–0.7)
UROBILINOGEN UR QL STRIP: ABNORMAL
VLDLC SERPL-MCNC: 19 MG/DL (ref 5–40)
WBC NRBC COR # BLD AUTO: 9.13 10*3/MM3 (ref 3.4–10.8)
WBC NRBC COR # BLD AUTO: 9.64 10*3/MM3 (ref 3.4–10.8)

## 2025-07-25 PROCEDURE — 83735 ASSAY OF MAGNESIUM: CPT | Performed by: STUDENT IN AN ORGANIZED HEALTH CARE EDUCATION/TRAINING PROGRAM

## 2025-07-25 PROCEDURE — 25010000002 AMIODARONE IN DEXTROSE 5% 150-4.21 MG/100ML-% SOLUTION

## 2025-07-25 PROCEDURE — 25810000003 SODIUM CHLORIDE 0.9 % SOLUTION: Performed by: STUDENT IN AN ORGANIZED HEALTH CARE EDUCATION/TRAINING PROGRAM

## 2025-07-25 PROCEDURE — 25510000001 IOPAMIDOL 61 % SOLUTION

## 2025-07-25 PROCEDURE — 93010 ELECTROCARDIOGRAM REPORT: CPT | Performed by: SPECIALIST

## 2025-07-25 PROCEDURE — 5A2204Z RESTORATION OF CARDIAC RHYTHM, SINGLE: ICD-10-PCS

## 2025-07-25 PROCEDURE — 83605 ASSAY OF LACTIC ACID: CPT

## 2025-07-25 PROCEDURE — 25010000002 ADENOSINE PER 6 MG

## 2025-07-25 PROCEDURE — 85610 PROTHROMBIN TIME: CPT | Performed by: STUDENT IN AN ORGANIZED HEALTH CARE EDUCATION/TRAINING PROGRAM

## 2025-07-25 PROCEDURE — 25810000003 LACTATED RINGERS PER 1000 ML: Performed by: STUDENT IN AN ORGANIZED HEALTH CARE EDUCATION/TRAINING PROGRAM

## 2025-07-25 PROCEDURE — 85520 HEPARIN ASSAY: CPT | Performed by: STUDENT IN AN ORGANIZED HEALTH CARE EDUCATION/TRAINING PROGRAM

## 2025-07-25 PROCEDURE — 71045 X-RAY EXAM CHEST 1 VIEW: CPT

## 2025-07-25 PROCEDURE — 25010000002 DILTIAZEM 25 MG/5ML SOLUTION

## 2025-07-25 PROCEDURE — 82550 ASSAY OF CK (CPK): CPT | Performed by: STUDENT IN AN ORGANIZED HEALTH CARE EDUCATION/TRAINING PROGRAM

## 2025-07-25 PROCEDURE — 25010000002 AMIODARONE IN DEXTROSE 5% 360-4.14 MG/200ML-% SOLUTION: Performed by: STUDENT IN AN ORGANIZED HEALTH CARE EDUCATION/TRAINING PROGRAM

## 2025-07-25 PROCEDURE — 99223 1ST HOSP IP/OBS HIGH 75: CPT

## 2025-07-25 PROCEDURE — 74177 CT ABD & PELVIS W/CONTRAST: CPT

## 2025-07-25 PROCEDURE — 93010 ELECTROCARDIOGRAM REPORT: CPT | Performed by: INTERNAL MEDICINE

## 2025-07-25 PROCEDURE — 83036 HEMOGLOBIN GLYCOSYLATED A1C: CPT | Performed by: STUDENT IN AN ORGANIZED HEALTH CARE EDUCATION/TRAINING PROGRAM

## 2025-07-25 PROCEDURE — 71045 X-RAY EXAM CHEST 1 VIEW: CPT | Performed by: RADIOLOGY

## 2025-07-25 PROCEDURE — 85610 PROTHROMBIN TIME: CPT

## 2025-07-25 PROCEDURE — 85730 THROMBOPLASTIN TIME PARTIAL: CPT

## 2025-07-25 PROCEDURE — 25010000002 HEPARIN (PORCINE) 25000-0.45 UT/250ML-% SOLUTION: Performed by: STUDENT IN AN ORGANIZED HEALTH CARE EDUCATION/TRAINING PROGRAM

## 2025-07-25 PROCEDURE — 93005 ELECTROCARDIOGRAM TRACING: CPT

## 2025-07-25 PROCEDURE — 25010000002

## 2025-07-25 PROCEDURE — 83735 ASSAY OF MAGNESIUM: CPT

## 2025-07-25 PROCEDURE — 74177 CT ABD & PELVIS W/CONTRAST: CPT | Performed by: RADIOLOGY

## 2025-07-25 PROCEDURE — 84484 ASSAY OF TROPONIN QUANT: CPT

## 2025-07-25 PROCEDURE — 99285 EMERGENCY DEPT VISIT HI MDM: CPT

## 2025-07-25 PROCEDURE — 85025 COMPLETE CBC W/AUTO DIFF WBC: CPT

## 2025-07-25 PROCEDURE — 36415 COLL VENOUS BLD VENIPUNCTURE: CPT | Performed by: STUDENT IN AN ORGANIZED HEALTH CARE EDUCATION/TRAINING PROGRAM

## 2025-07-25 PROCEDURE — 84443 ASSAY THYROID STIM HORMONE: CPT

## 2025-07-25 PROCEDURE — 80053 COMPREHEN METABOLIC PANEL: CPT | Performed by: STUDENT IN AN ORGANIZED HEALTH CARE EDUCATION/TRAINING PROGRAM

## 2025-07-25 PROCEDURE — 81003 URINALYSIS AUTO W/O SCOPE: CPT

## 2025-07-25 PROCEDURE — 25010000002 AMIODARONE IN DEXTROSE 5% 360-4.14 MG/200ML-% SOLUTION

## 2025-07-25 PROCEDURE — 85025 COMPLETE CBC W/AUTO DIFF WBC: CPT | Performed by: STUDENT IN AN ORGANIZED HEALTH CARE EDUCATION/TRAINING PROGRAM

## 2025-07-25 PROCEDURE — 80061 LIPID PANEL: CPT | Performed by: STUDENT IN AN ORGANIZED HEALTH CARE EDUCATION/TRAINING PROGRAM

## 2025-07-25 PROCEDURE — 80053 COMPREHEN METABOLIC PANEL: CPT

## 2025-07-25 RX ORDER — DILTIAZEM HYDROCHLORIDE 180 MG/1
180 CAPSULE, COATED, EXTENDED RELEASE ORAL NIGHTLY
COMMUNITY
End: 2025-07-31 | Stop reason: HOSPADM

## 2025-07-25 RX ORDER — LOSARTAN POTASSIUM 100 MG/1
100 TABLET ORAL DAILY
COMMUNITY

## 2025-07-25 RX ORDER — HEPARIN SODIUM 5000 [USP'U]/ML
2000 INJECTION, SOLUTION INTRAVENOUS; SUBCUTANEOUS AS NEEDED
Status: DISCONTINUED | OUTPATIENT
Start: 2025-07-25 | End: 2025-07-31 | Stop reason: HOSPADM

## 2025-07-25 RX ORDER — ADENOSINE 3 MG/ML
INJECTION, SOLUTION INTRAVENOUS
Status: COMPLETED
Start: 2025-07-25 | End: 2025-07-25

## 2025-07-25 RX ORDER — SODIUM CHLORIDE, SODIUM LACTATE, POTASSIUM CHLORIDE, CALCIUM CHLORIDE 600; 310; 30; 20 MG/100ML; MG/100ML; MG/100ML; MG/100ML
75 INJECTION, SOLUTION INTRAVENOUS CONTINUOUS
Status: ACTIVE | OUTPATIENT
Start: 2025-07-25 | End: 2025-07-26

## 2025-07-25 RX ORDER — ADENOSINE 3 MG/ML
12 INJECTION, SOLUTION INTRAVENOUS ONCE
Status: COMPLETED | OUTPATIENT
Start: 2025-07-25 | End: 2025-07-25

## 2025-07-25 RX ORDER — HEPARIN SODIUM 5000 [USP'U]/ML
4000 INJECTION, SOLUTION INTRAVENOUS; SUBCUTANEOUS AS NEEDED
Status: DISCONTINUED | OUTPATIENT
Start: 2025-07-25 | End: 2025-07-25 | Stop reason: ALTCHOICE

## 2025-07-25 RX ORDER — DILTIAZEM HYDROCHLORIDE 5 MG/ML
15 INJECTION INTRAVENOUS ONCE
Status: DISCONTINUED | OUTPATIENT
Start: 2025-07-25 | End: 2025-07-25

## 2025-07-25 RX ORDER — SODIUM CHLORIDE 0.9 % (FLUSH) 0.9 %
10 SYRINGE (ML) INJECTION EVERY 12 HOURS SCHEDULED
Status: DISCONTINUED | OUTPATIENT
Start: 2025-07-25 | End: 2025-07-31 | Stop reason: HOSPADM

## 2025-07-25 RX ORDER — HEPARIN SODIUM 10000 [USP'U]/100ML
9.6 INJECTION, SOLUTION INTRAVENOUS
Status: DISCONTINUED | OUTPATIENT
Start: 2025-07-25 | End: 2025-07-28

## 2025-07-25 RX ORDER — DILTIAZEM HYDROCHLORIDE 5 MG/ML
10 INJECTION INTRAVENOUS ONCE
Status: COMPLETED | OUTPATIENT
Start: 2025-07-25 | End: 2025-07-25

## 2025-07-25 RX ORDER — MORPHINE SULFATE 2 MG/ML
2 INJECTION, SOLUTION INTRAMUSCULAR; INTRAVENOUS
Status: DISCONTINUED | OUTPATIENT
Start: 2025-07-25 | End: 2025-07-31 | Stop reason: HOSPADM

## 2025-07-25 RX ORDER — SODIUM CHLORIDE 9 MG/ML
40 INJECTION, SOLUTION INTRAVENOUS AS NEEDED
Status: DISCONTINUED | OUTPATIENT
Start: 2025-07-25 | End: 2025-07-31 | Stop reason: HOSPADM

## 2025-07-25 RX ORDER — ONDANSETRON 2 MG/ML
4 INJECTION INTRAMUSCULAR; INTRAVENOUS EVERY 6 HOURS PRN
Status: DISCONTINUED | OUTPATIENT
Start: 2025-07-25 | End: 2025-07-31 | Stop reason: HOSPADM

## 2025-07-25 RX ORDER — NITROGLYCERIN 0.4 MG/1
0.4 TABLET SUBLINGUAL
Status: DISCONTINUED | OUTPATIENT
Start: 2025-07-25 | End: 2025-07-27

## 2025-07-25 RX ORDER — PROCHLORPERAZINE EDISYLATE 5 MG/ML
10 INJECTION INTRAMUSCULAR; INTRAVENOUS EVERY 6 HOURS PRN
Status: DISCONTINUED | OUTPATIENT
Start: 2025-07-25 | End: 2025-07-31 | Stop reason: HOSPADM

## 2025-07-25 RX ORDER — HEPARIN SODIUM 10000 [USP'U]/100ML
1000 INJECTION, SOLUTION INTRAVENOUS
Status: DISCONTINUED | OUTPATIENT
Start: 2025-07-25 | End: 2025-07-25

## 2025-07-25 RX ORDER — IOPAMIDOL 612 MG/ML
85 INJECTION, SOLUTION INTRAVASCULAR
Status: COMPLETED | OUTPATIENT
Start: 2025-07-25 | End: 2025-07-25

## 2025-07-25 RX ORDER — SODIUM CHLORIDE 0.9 % (FLUSH) 0.9 %
10 SYRINGE (ML) INJECTION AS NEEDED
Status: DISCONTINUED | OUTPATIENT
Start: 2025-07-25 | End: 2025-07-31 | Stop reason: HOSPADM

## 2025-07-25 RX ORDER — PANTOPRAZOLE SODIUM 40 MG/10ML
40 INJECTION, POWDER, LYOPHILIZED, FOR SOLUTION INTRAVENOUS
Status: DISCONTINUED | OUTPATIENT
Start: 2025-07-26 | End: 2025-07-26

## 2025-07-25 RX ORDER — TADALAFIL 5 MG/1
5 TABLET ORAL NIGHTLY
COMMUNITY

## 2025-07-25 RX ORDER — ONDANSETRON 4 MG/1
4 TABLET, FILM COATED ORAL EVERY 8 HOURS PRN
COMMUNITY

## 2025-07-25 RX ADMIN — AMIODARONE HYDROCHLORIDE 1 MG/MIN: 1.8 INJECTION, SOLUTION INTRAVENOUS at 13:24

## 2025-07-25 RX ADMIN — ADENOSINE 12 MG: 3 INJECTION, SOLUTION INTRAVENOUS at 12:20

## 2025-07-25 RX ADMIN — SODIUM CHLORIDE 2000 ML: 9 INJECTION, SOLUTION INTRAVENOUS at 17:16

## 2025-07-25 RX ADMIN — HEPARIN SODIUM 11.6 UNITS/KG/HR: 10000 INJECTION, SOLUTION INTRAVENOUS at 17:16

## 2025-07-25 RX ADMIN — Medication 10 ML: at 21:48

## 2025-07-25 RX ADMIN — AMIODARONE HYDROCHLORIDE 150 MG: 1.5 INJECTION, SOLUTION INTRAVENOUS at 13:14

## 2025-07-25 RX ADMIN — AMIODARONE HYDROCHLORIDE 0.5 MG/MIN: 1.8 INJECTION, SOLUTION INTRAVENOUS at 18:51

## 2025-07-25 RX ADMIN — IOPAMIDOL 85 ML: 612 INJECTION, SOLUTION INTRAVENOUS at 13:41

## 2025-07-25 RX ADMIN — DILTIAZEM HYDROCHLORIDE 10 MG: 5 INJECTION INTRAVENOUS at 12:23

## 2025-07-25 RX ADMIN — ADENOSINE: 3 INJECTION, SOLUTION INTRAVENOUS at 12:16

## 2025-07-25 RX ADMIN — SODIUM CHLORIDE, POTASSIUM CHLORIDE, SODIUM LACTATE AND CALCIUM CHLORIDE 75 ML/HR: 600; 310; 30; 20 INJECTION, SOLUTION INTRAVENOUS at 18:52

## 2025-07-25 NOTE — H&P
[facility] Medicine Services  HISTORY & PHYSICAL    Patient Identification:  Name:  Crow Zeng  Age:  79 y.o.  Sex:  male  :  1945  MRN:  [id number]   Visit Number:  [id number]  Admit Date: 2025   Primary Care Physician:  Ruby Choudhary APRN     Subjective     Chief complaint:   Chief Complaint   Patient presents with    Constipation    Rapid Heart Rate     History of presenting illness:   Patient is a 79 y.o. male with past medical history significant for prostate cancer, BPH, hypertension that presented to the [facility] emergency department for evaluation of constipation and rapid heart rate.    Patient presented to the ED today complaining of abdominal distention and pain.  He saw his PCP prior to arrival to the ED for the symptoms as well as tachycardia, and they recommended him go to the emergency department.  He reports he actually felt somewhat better this morning abdominal pain wise, but due to his fast heart rate he was instructed to come to the emergency department.He states he had no prior history of atrial fibrillation.  He states he has been on Cardizem for many years for blood pressure control.  He states he began feeling unwell Tuesday.  He states he had some more vomiting on Wednesday and Thursday, but no more vomiting today.  He states he has been passing gas and had liquid bowel movements over the past several days.  Denies any constipation.  Denies any chest pain.    Upon arrival to the ED, vitals were temperature 97.7, , RR 20, /75, SpO2 98% on room air.  Did have brief episode of hypotension, 88/62, but since improved.  Heart rate now 78.  Received adenosine, diltiazem, and amiodarone in the ED.  Placed on amiodarone drip.Initial troponin 22, repeat 17.  BUN 25.7, creatinine 1.48.  Hemoglobin 6.06. CT abdomen/pelvis shows fluid-filled distended small bowel with largest area in left upper/lower quadrant, caliber transition point  within the right mid quadrants.  Early/partial small bowel obstruction.    Patient has been admitted to the PCU  ---------------------------------------------------------------------------------------------------------------------   Review of Systems   Constitutional:  Negative for chills and fever.   Respiratory:  Negative for cough and shortness of breath.    Cardiovascular:  Negative for chest pain and palpitations.   Gastrointestinal:  Positive for abdominal pain, nausea and vomiting. Negative for constipation and diarrhea.   Genitourinary:  Negative for difficulty urinating and dysuria.   Musculoskeletal:  Negative for arthralgias and myalgias.   Skin:  Negative for rash and wound.   Neurological:  Negative for dizziness, weakness and light-headedness.   Psychiatric/Behavioral:  Negative for agitation and confusion.       ---------------------------------------------------------------------------------------------------------------------   Past Medical History:   Diagnosis Date    BPH with elevated PSA 6/9/2023    Elevated PSA     Hypertension      Past Surgical History:   Procedure Laterality Date    APPENDECTOMY      SKIN GRAFT       Family History   Problem Relation Age of Onset    No Known Problems Father     Cancer Mother      Social History     Socioeconomic History    Marital status:    Tobacco Use    Smoking status: Never    Smokeless tobacco: Never   Vaping Use    Vaping status: Never Used   Substance and Sexual Activity    Alcohol use: Never    Drug use: Never    Sexual activity: Defer     ---------------------------------------------------------------------------------------------------------------------   Allergies:  Patient has no known allergies.  ---------------------------------------------------------------------------------------------------------------------   Medications below are reported home medications pulling from within the system; at this time, these medications have not been  reconciled unless otherwise specified and are in the verification process for further verifcation as current home medications.    Prior to Admission Medications       Prescriptions Last Dose Informant Patient Reported? Taking?    dilTIAZem CD (CARDIZEM CD) 180 MG 24 hr capsule   Yes No    Take 1 capsule by mouth Daily.    losartan (COZAAR) 100 MG tablet   Yes No    Take 1 tablet by mouth Daily.    pravastatin (PRAVACHOL) 20 MG tablet   Yes No    Take 1 tablet by mouth Daily.    predniSONE (DELTASONE) 2.5 MG tablet   Yes No    tadalafil (CIALIS) 5 MG tablet   No No    Take 1 tablet by mouth Daily.          ---------------------------------------------------------------------------------------------------------------------    Objective     Hospital Scheduled Meds:       amiodarone, 1 mg/min, Last Rate: 1 mg/min (07/25/25 1402)   Followed by  amiodarone, 0.5 mg/min  heparin, 11.6 Units/kg/hr (Dosing Weight), Last Rate: 11.6 Units/kg/hr (07/25/25 1716)  Pharmacy to Dose Heparin,         Current listed hospital scheduled medications may not yet reflect those currently placed in orders that are signed and held, awaiting patient's arrival to floor/unit.    ---------------------------------------------------------------------------------------------------------------------   Vital Signs:  Temp:  [97.4 °F (36.3 °C)-97.7 °F (36.5 °C)] 97.4 °F (36.3 °C)  Heart Rate:  [] 78  Resp:  [20-23] 23  BP: ()/() 151/87  Mean Arterial Pressure (Non-Invasive) for the past 24 hrs (Last 3 readings):   Noninvasive MAP (mmHg)   07/25/25 1730 115   07/25/25 1701 113   07/25/25 1620 113     SpO2 Percentage    07/25/25 1715 07/25/25 1730 07/25/25 1745   SpO2: 98% 95% 95%     SpO2:  [90 %-99 %] 95 %  on   ;   Device (Oxygen Therapy): room air    Body mass index is 27.74 kg/m².  Wt Readings from Last 3 Encounters:   07/25/25 85.2 kg (187 lb 13.3 oz)   04/09/25 90.4 kg (199 lb 6.4 oz)   10/09/24 90.3 kg (199 lb)      ---------------------------------------------------------------------------------------------------------------------   Physical Exam:  Physical Exam  Constitutional:       General: He is not in acute distress.     Appearance: Normal appearance.   HENT:      Head: Normocephalic and atraumatic.      Right Ear: External ear normal.      Left Ear: External ear normal.      Nose: No nasal deformity.      Mouth/Throat:      Lips: Pink.      Mouth: Mucous membranes are moist.   Eyes:      Conjunctiva/sclera: Conjunctivae normal.      Pupils: Pupils are equal, round, and reactive to light.   Cardiovascular:      Rate and Rhythm: Normal rate. Rhythm irregularly irregular.      Pulses:           Dorsalis pedis pulses are 2+ on the right side and 2+ on the left side.      Heart sounds: Normal heart sounds.   Pulmonary:      Effort: Pulmonary effort is normal.      Breath sounds: Normal breath sounds. No wheezing, rhonchi or rales.   Abdominal:      General: Abdomen is flat. Bowel sounds are decreased. There is distension.      Palpations: Abdomen is soft.      Tenderness: There is no guarding or rebound.   Genitourinary:     Comments: No simon catheter in place   Musculoskeletal:      Cervical back: Neck supple. Normal range of motion.      Right lower leg: No edema.      Left lower leg: No edema.   Skin:     General: Skin is warm and dry.   Neurological:      General: No focal deficit present.      Mental Status: He is alert and oriented to person, place, and time.   Psychiatric:         Mood and Affect: Mood normal.         Behavior: Behavior normal.       ---------------------------------------------------------------------------------------------------------------------  EKG:        I have personally reviewed the EKG/Telemetry strip  ---------------------------------------------------------------------------------------------------------------------   Results from last 7 days   Lab Units 07/25/25  1421 07/25/25  1235   CK  "TOTAL U/L 134  --    HSTROP T ng/L 17 22*       Results from last 7 days   Lab Units 07/25/25  1421   CHOLESTEROL mg/dL 125   TRIGLYCERIDES mg/dL 102   HDL CHOL mg/dL 38*   LDL CHOL mg/dL 68       Results from last 7 days   Lab Units 07/25/25  1235   LACTATE mmol/L 1.4   WBC 10*3/mm3 9.64   HEMOGLOBIN g/dL 14.5   HEMATOCRIT % 44.8   MCV fL 94.1   MCHC g/dL 32.4   PLATELETS 10*3/mm3 297   INR  1.05     Results from last 7 days   Lab Units 07/25/25  1235   SODIUM mmol/L 136   POTASSIUM mmol/L 4.3   MAGNESIUM mg/dL 2.1   CHLORIDE mmol/L 102   CO2 mmol/L 20.2*   BUN mg/dL 25.7*   CREATININE mg/dL 1.48*   CALCIUM mg/dL 8.9   GLUCOSE mg/dL 112*   ALBUMIN g/dL 4.2   BILIRUBIN mg/dL 0.9   ALK PHOS U/L 87   AST (SGOT) U/L 20   ALT (SGPT) U/L 10   Estimated Creatinine Clearance: 43.1 mL/min (A) (by C-G formula based on SCr of 1.48 mg/dL (H)).  No results found for: \"AMMONIA\"    Hemoglobin A1C   Date/Time Value Ref Range Status   07/25/2025 1235 6.06 (H) 4.80 - 5.60 % Final     Lab Results   Component Value Date    HGBA1C 6.06 (H) 07/25/2025     Lab Results   Component Value Date    TSH 1.330 07/25/2025       No results found for: \"BLOODCX\"  No results found for: \"URINECX\"  No results found for: \"WOUNDCX\"  No results found for: \"STOOLCX\"  No results found for: \"RESPCX\"  No results found for: \"MRSACX\"  Pain Management Panel           No data to display              I have personally reviewed the above laboratory results.   ---------------------------------------------------------------------------------------------------------------------  Imaging Results (Last 7 Days)       Procedure Component Value Units Date/Time    CT Abdomen Pelvis With Contrast [[id number]] Collected: 07/25/25 1353     Updated: 07/25/25 4099    Narrative:      EXAM:    CT Abdomen and Pelvis With Intravenous Contrast     EXAM DATE:    7/25/2025 1:53 PM     CLINICAL HISTORY:    Abdominal distention with nausea and periumbilical abdominal pain.   "   TECHNIQUE:    Axial computed tomography images of the abdomen and pelvis with  intravenous contrast.  Sagittal and coronal reformatted images were  created and reviewed.  This CT exam was performed using one or more of  the following dose reduction techniques:  automated exposure control,  adjustment of the mA and/or kV according to patient size, and/or use of  iterative reconstruction technique.     COMPARISON:    No relevant prior studies available.     FINDINGS:    Lung bases:  Unremarkable as visualized.  No mass.  No consolidation.      ABDOMEN:    Liver:  Unremarkable as visualized.  No mass.    Gallbladder and bile ducts:  Gallstones.  Gallbladder otherwise  unremarkable.  No ductal dilation.    Pancreas:  Unremarkable as visualized.  No mass.  No ductal dilation.    Spleen:  Unremarkable as visualized.  No splenomegaly.    Adrenals:  Unremarkable as visualized.  No mass.    Kidneys and ureters:  Right simple appearing renal cyst measuring 5.9  cm. Left renal collecting system renal cyst measuring 5.6 cm also  appearing simple.  No hydronephrosis.    Stomach and bowel:  Numerous loops of fluid-filled distended small  bowel with the largest in the left upper lower quadrant measuring about  4.4 cm with caliber transition point somewhere within the right mid  quadrants nonspecific. This could represent early or partial small bowel  obstruction.  Scattered diverticula in the colon.  No diverticulitis.      PELVIS:    Appendix:  The appendix is not definitively identified.    Bladder:  Unremarkable as visualized.  No mass.    Reproductive:  Unremarkable as visualized.      ABDOMEN and PELVIS:    Intraperitoneal space:  Unremarkable as visualized.  No free air.  No  significant fluid collection.    Bones/joints:  No acute fracture.  No dislocation.    Soft tissues:  Small umbilical hernia containing only fat.    Vasculature:  Unremarkable as visualized.  No abdominal aortic  aneurysm.    Lymph nodes:   Unremarkable as visualized.  No enlarged lymph nodes.       Impression:      1.  Numerous loops of fluid-filled distended small bowel with the  largest in the left upper lower quadrant measuring about 4.4 cm with  caliber transition point somewhere within the right mid quadrants  nonspecific. This could represent early or partial small bowel  obstruction.  2.  Gallstones.  Gallbladder otherwise unremarkable.  3.  Right simple appearing renal cyst measuring 5.9 cm. Left renal  collecting system renal cyst measuring 5.6 cm also appearing simple.     This report was finalized on 7/25/2025 1:56 PM by Dr. Doug Martinez MD.       XR Chest 1 View [[id number]] Collected: 07/25/25 1350     Updated: 07/25/25 1353    Narrative:      EXAM:    XR Chest, 1 View     EXAM DATE:    7/25/2025 1:50 PM     CLINICAL HISTORY:    Tachycardia     TECHNIQUE:    Frontal view of the chest.     COMPARISON:    No relevant prior studies available.     FINDINGS:    Lungs and pleural spaces:  Left diaphragm not well seen and possibly  due to small left pleural effusion or atelectasis.  No pneumothorax.    Heart:  Unremarkable as visualized.  No cardiomegaly.    Mediastinum:  Unremarkable as visualized.  Normal mediastinal contour.    Bones/joints:  Unremarkable as visualized.  No acute fracture.       Impression:        Left diaphragm not well seen and possibly due to small left pleural  effusion or atelectasis.     This report was finalized on 7/25/2025 1:51 PM by Dr. Doug Martinez MD.             I have personally reviewed the above radiology results.     Last Echocardiogram:  Results for orders placed during the hospital encounter of 11/08/18    Adult Transthoracic Echo Complete W/ Cont if Necessary Per Protocol 11/11/2018  5:25 PM    Interpretation Summary  · Normal left ventricular cavity size and wall thickness noted. All left ventricular wall segments contract normally.  · Estimated EF appears to be in the range of 61 - 65%.  · The aortic  valve is not well visualized. The aortic valve is grossly normal in structure. No aortic valve regurgitation is present. No aortic valve stenosis is present.  · The mitral valve is normal in structure. No mitral valve regurgitation is present. No significant mitral valve stenosis is present.  · The tricuspid valve is normal. No tricuspid valve stenosis is present. No tricuspid valve regurgitation is present.  · There is no evidence of pericardial effusion.    ---------------------------------------------------------------------------------------------------------------------    Assessment & Plan      Active Hospital Problems    Diagnosis  POA    **Atrial fibrillation with RVR [I48.91]  Yes     Atrial fibrillation with RVR, new onset, POA  Hypertension   Patient does not have a known history of atrial fibrillation   Presented in atrial fibrillation with RVR,   Received adenosine, Cardizem, and placed on amiodarone drip in the ED. Continue amiodarone, titrate dosing as needed  Plan to resume home Cardizem  Anticoagulation with heparin drip  Pharmacy to price DOAC  TTE ordered  Continue to monitor closely on telemetry   Small bowel obstruction, POA  CT abdomen/pelvis shows fluid-filled distended small bowel with largest area in left upper/lower quadrant, caliber transition point within the right mid quadrants.  Early/partial small bowel obstruction.  Patient currently not nauseous or vomiting, passing gas and having bowel movements.  Plan to keep n.p.o. with ice chips overnight and potentially trial  IV fluid hydration  Nausea medication    F/E/N: IV LR.  Continue monitor electrolytes.  NPO sips with meds/ice chips    ---------------------------------------------------  DVT Prophylaxis: Heparin  Activity: Up with assistance    The patient is considered to be a high risk patient due to: New onset atrial fibrillation, SBO    INPATIENT status due to the need for care which can only be reasonably provided in an  hospital setting such as aggressive/expedited ancillary services and/or consultation services, the necessity for IV medications, close physician monitoring and/or the possible need for procedures.  In such, I feel patient’s risk for adverse outcomes and need for care warrant INPATIENT evaluation and predict the patient’s care encounter to likely last beyond 2 midnights.      Code Status: Full code    Disposition/Discharge planning: Pending clinical course    I have discussed the patient's assessment and plan with Dr. Sanjeev Chappell PA-C  Hospitalist Service -- [facility]       07/25/25  18:24 EDT    Attending Physician: Delvis Pace*

## 2025-07-25 NOTE — PROGRESS NOTES
HEPARIN INFUSION  Crow Zeng is a  79 y.o. male receiving heparin infusion.     Therapy for (VTE/Cardiac):   cardiac  Patient Dosing Weight:   86.2  kg  Initial Bolus (Y/N):     no  Any Bolus (Y/N):       yes    Signs or Symptoms of Bleeding:     no    Cardiac or Other (Not VTE)   Initial Bolus: 60 units/kg (Max 4,000 units)  Initial rate: 12 units/kg/hr (Max 1,000 units/hr)   Anti Xa Rebolus Infusion Hold time Change infusion Dose (Units/kg/hr) Next Anti Xa or aPTT Level Due   < 0.11 50 Units/kg  (4000 Units Max) None Increase by  3 Units/kg/hr 6 hours   0.11- 0.19 25 Units/kg  (2000 Units Max) None Increase by  2 Units/kg/hr 6 hours   0.2 - 0.29 0 None Increase by  1 Units/kg/hr 6 hours   0.3 - 0.5 0 None No Change 6 hours (after 2 consecutive levels in range check q24h @0700)   0.51 - 0.6 0 None Decrease by  1 Units/kg/hr 6 hours   0.61 - 0.8 0 30 Minutes Decrease by  2 Units/kg/hr 6 hours   0.81 - 1 0 60 Minutes Decrease by  3 Units/kg/hr 6 hours   >1 0 Hold  After Anti Xa less than 0.5 decrease previous rate by  4 Units/kg/hr  Every 2 hours until Anti Xa  less than 0.5 then when infusion restarts in 6 hours         Recommend anti-Xa every 6 hours.          Date   Time   Anti-Xa Current Rate (Unit/kg/hr) Bolus   (Units) Rate Change   (Unit/kg/hr) New Rate (Unit/kg/hr) Next   Anti-Xa Comments  Pump Check Daily   7/25 1640 pending 0 no 11.60 11.60 2300 No initial bolus, rate adjusted, no s/s bleeding, d/w   Shan ROCHE                                                                                                                                                                                                                                 Pharmacy will continue to follow anti-Xa results and monitor for signs and symptoms of bleeding or thrombosis.    Marcos Anderson, PharmD  07/25/25 16:21 EDT

## 2025-07-25 NOTE — ED PROVIDER NOTES
Subjective   History of Present Illness  Patient presents today with abdominal distention and suprapubic/periumbilical pain that has been since Tuesday.  The patient's had nausea but no vomiting.  The patient feels generally unwell.  The patient was sent in by his family doctor for the symptoms as well as tachycardia.  The patient denies any chest pain, palpitations, shortness of breath, or other cardiorespiratory symptoms.  The patient denies any other GI symptoms, except as stated above.      Review of Systems   Constitutional:  Positive for activity change, appetite change and fatigue. Negative for chills, diaphoresis and fever.   HENT: Negative.     Respiratory: Negative.     Cardiovascular: Negative.    Gastrointestinal:  Positive for abdominal distention, abdominal pain and vomiting. Negative for constipation and diarrhea.   Genitourinary: Negative.    Musculoskeletal: Negative.    Skin: Negative.    Neurological: Negative.    Psychiatric/Behavioral: Negative.         Past Medical History:   Diagnosis Date    BPH with elevated PSA 6/9/2023    Elevated PSA     Hypertension        Allergies   Allergen Reactions    Doxazosin Unknown - High Severity     Unknown - Side effects    Flomax [Tamsulosin] Unknown - High Severity     Unknown - Side effects       Past Surgical History:   Procedure Laterality Date    APPENDECTOMY      SKIN GRAFT         Family History   Problem Relation Age of Onset    No Known Problems Father     Cancer Mother        Social History     Socioeconomic History    Marital status:    Tobacco Use    Smoking status: Never    Smokeless tobacco: Never   Vaping Use    Vaping status: Never Used   Substance and Sexual Activity    Alcohol use: Never    Drug use: Never    Sexual activity: Defer           Objective   Physical Exam  Vitals and nursing note reviewed.   Constitutional:       General: He is awake. He is not in acute distress.     Appearance: He is overweight. He is ill-appearing. He  is not toxic-appearing or diaphoretic.   HENT:      Head: Normocephalic and atraumatic.      Mouth/Throat:      Pharynx: Oropharynx is clear. No oropharyngeal exudate or posterior oropharyngeal erythema.   Eyes:      General:         Right eye: No discharge.         Left eye: No discharge.      Extraocular Movements: Extraocular movements intact.      Conjunctiva/sclera: Conjunctivae normal.      Pupils: Pupils are equal, round, and reactive to light.   Cardiovascular:      Rate and Rhythm: Regular rhythm. Tachycardia present.      Heart sounds: Normal heart sounds. No murmur heard.     No friction rub. No gallop.   Pulmonary:      Effort: Pulmonary effort is normal. No respiratory distress.      Breath sounds: Normal breath sounds. No stridor.   Abdominal:      General: There is distension.      Palpations: Abdomen is soft. There is no mass.      Tenderness: There is abdominal tenderness in the epigastric area and periumbilical area. There is no guarding or rebound.      Comments: Umbilical hernia.   Skin:     Coloration: Skin is pale. Skin is not jaundiced.      Findings: No bruising, erythema or rash.   Neurological:      General: No focal deficit present.      Mental Status: He is alert and oriented to person, place, and time. Mental status is at baseline.   Psychiatric:         Behavior: Behavior normal. Behavior is cooperative.         Thought Content: Thought content normal.         Judgment: Judgment normal.         Chemical Cardioversion    Date/Time: 7/25/2025 5:54 PM    Performed by: Tyson Soliz MD  Authorized by: Delvis Pace DO    Consent:     Consent obtained:  Verbal and written    Consent given by:  Patient    Risks discussed:  Death, induced arrhythmia and pain    Alternatives discussed:  No treatment, electrical cardioversion, delayed treatment, alternative treatment and observation  Pre-procedure details:     Cardioversion basis:  Emergent    Rhythm:  Atrial  fibrillation  Attempt one:     Cardioversion medication:  Adenosine 6mg      Medication outcome:  No change in rhythm  Attempt two:     Cardioversion medication:  Adenosine 12mg      Shock outcome:  No change in rhythm   Post-procedure details:     Patient status:  Awake    Patient tolerance of procedure:  Tolerated well, no immediate complications  Comments:      Electronically signed by Tyson Soliz MD, 07/25/25, 5:55 PM EDT.               ED Course  ED Course as of 07/29/25 1546   Fri Jul 25, 2025   1202 BP: 100/75 [RA]   1202 Temp: 97.7 °F (36.5 °C) [RA]   1202 Heart Rate(!): 162 [RA]   1202 Resp: 20 [RA]   1202 SpO2: 98 % [RA]   1202 Device (Oxygen Therapy): room air [RA]   1407 ECG demonstrates atrial fibrillation at rate of 119 bpm.  QRS duration is 70 ms.  QTc is 396 ms.  There are no acute ST-T wave changes. [RA]   1536 Glucose(!): 112 [RA]   1536 BUN(!): 25.7 [RA]   1536 Creatinine(!): 1.48 [RA]   1536 CO2(!): 20.2 [RA]   1536 HS Troponin T(!): 22 [RA]   1536 HS Troponin T: 17 [RA]   1536 Troponin T Numeric Delta: -5 [RA]   1536 Troponin T % Delta: -23 [RA]   1537 CT Abdomen Pelvis With Contrast  1.  Numerous loops of fluid-filled distended small bowel with the  largest in the left upper lower quadrant measuring about 4.4 cm with  caliber transition point somewhere within the right mid quadrants  nonspecific. This could represent early or partial small bowel  obstruction.  2.  Gallstones.  Gallbladder otherwise unremarkable.  3.  Right simple appearing renal cyst measuring 5.9 cm. Left renal  collecting system renal cyst measuring 5.6 cm also appearing simple.   [RA]   1756 ECG at 11:58 AM demonstrates tachycardia rate of 163 bpm with concerns for atrial flutter/fibrillation.  QRS duration is 78 ms.  QTc 392 ms.  There are no acute ST-T wave changes. [RA]      ED Course User Index  [RA] Tyson Soliz MD                                                       Medical Decision Making  Problems  Addressed:  Atrial fibrillation with RVR: complicated acute illness or injury  Other partial intestinal obstruction: complicated acute illness or injury    Amount and/or Complexity of Data Reviewed  Labs: ordered. Decision-making details documented in ED Course.  Radiology: ordered. Decision-making details documented in ED Course.  ECG/medicine tests: ordered.    Risk  OTC drugs.  Prescription drug management.  Decision regarding hospitalization.        Final diagnoses:   Atrial fibrillation with RVR   Other partial intestinal obstruction       ED Disposition  ED Disposition       ED Disposition   Decision to Admit    Condition   --    Comment   Level of Care: Progressive Care [20]   Diagnosis: Atrial fibrillation with RVR [215740]   Certification: I Certify That Inpatient Hospital Services Are Medically Necessary For Greater Than 2 Midnights                 No follow-up provider specified.       Medication List        ASK your doctor about these medications      dilTIAZem  MG 24 hr capsule  Commonly known as: CARDIZEM CD  Ask about: Which instructions should I use?     losartan 100 MG tablet  Commonly known as: COZAAR  Ask about: Which instructions should I use?     tadalafil 5 MG tablet  Commonly known as: CIALIS  Ask about: Which instructions should I use?                 Tyson Soliz MD  07/25/25 1633       Tyson Soliz MD  07/25/25 5786       Tyson Soliz MD  07/29/25 1542

## 2025-07-25 NOTE — PLAN OF CARE
Problem: Adult Inpatient Plan of Care  Goal: Plan of Care Review  Outcome: Progressing  Goal: Patient-Specific Goal (Individualized)  Outcome: Progressing  Goal: Absence of Hospital-Acquired Illness or Injury  Outcome: Progressing  Intervention: Identify and Manage Fall Risk  Recent Flowsheet Documentation  Taken 7/25/2025 1700 by Tara Wallace RN  Safety Promotion/Fall Prevention:   activity supervised   assistive device/personal items within reach   clutter free environment maintained   fall prevention program maintained   lighting adjusted   nonskid shoes/slippers when out of bed   room organization consistent   safety round/check completed  Intervention: Prevent Skin Injury  Recent Flowsheet Documentation  Taken 7/25/2025 1730 by Tara Wallace RN  Skin Protection: transparent dressing maintained  Intervention: Prevent Infection  Recent Flowsheet Documentation  Taken 7/25/2025 1700 by Tara Wallace RN  Infection Prevention:   single patient room provided   visitors restricted/screened   rest/sleep promoted  Goal: Optimal Comfort and Wellbeing  Outcome: Progressing  Intervention: Provide Person-Centered Care  Recent Flowsheet Documentation  Taken 7/25/2025 1730 by Tara Wallace RN  Trust Relationship/Rapport:   care explained   choices provided   questions answered   questions encouraged  Goal: Readiness for Transition of Care  Outcome: Progressing   Goal Outcome Evaluation:

## 2025-07-26 ENCOUNTER — APPOINTMENT (OUTPATIENT)
Dept: CARDIOLOGY | Facility: HOSPITAL | Age: 80
End: 2025-07-26
Payer: MEDICARE

## 2025-07-26 LAB
ALBUMIN SERPL-MCNC: 3.2 G/DL (ref 3.5–5.2)
ALBUMIN/GLOB SERPL: 1.3 G/DL
ALP SERPL-CCNC: 68 U/L (ref 39–117)
ALT SERPL W P-5'-P-CCNC: 8 U/L (ref 1–41)
ANION GAP SERPL CALCULATED.3IONS-SCNC: 11.8 MMOL/L (ref 5–15)
AORTIC DIMENSIONLESS INDEX: 0.76 (DI)
AST SERPL-CCNC: 18 U/L (ref 1–40)
AV MEAN PRESS GRAD SYS DOP V1V2: 3 MMHG
AV VMAX SYS DOP: 117 CM/SEC
BH CV ECHO MEAS - ACS: 2 CM
BH CV ECHO MEAS - AO MAX PG: 5.5 MMHG
BH CV ECHO MEAS - AO ROOT DIAM: 3.9 CM
BH CV ECHO MEAS - AO V2 VTI: 21.7 CM
BH CV ECHO MEAS - AVA(I,D): 2.9 CM2
BH CV ECHO MEAS - EDV(CUBED): 140.6 ML
BH CV ECHO MEAS - EDV(MOD-SP2): 65.2 ML
BH CV ECHO MEAS - EDV(MOD-SP4): 114 ML
BH CV ECHO MEAS - EF(MOD-SP2): 51.5 %
BH CV ECHO MEAS - EF(MOD-SP4): 52.7 %
BH CV ECHO MEAS - ESV(CUBED): 54.9 ML
BH CV ECHO MEAS - ESV(MOD-SP2): 31.6 ML
BH CV ECHO MEAS - ESV(MOD-SP4): 53.9 ML
BH CV ECHO MEAS - FS: 26.9 %
BH CV ECHO MEAS - IVS/LVPW: 1 CM
BH CV ECHO MEAS - IVSD: 1 CM
BH CV ECHO MEAS - LA DIMENSION: 3.3 CM
BH CV ECHO MEAS - LAT PEAK E' VEL: 15.3 CM/SEC
BH CV ECHO MEAS - LV DIASTOLIC VOL/BSA (35-75): 56.4 CM2
BH CV ECHO MEAS - LV MASS(C)D: 194.2 GRAMS
BH CV ECHO MEAS - LV MAX PG: 2.41 MMHG
BH CV ECHO MEAS - LV MEAN PG: 1 MMHG
BH CV ECHO MEAS - LV SYSTOLIC VOL/BSA (12-30): 26.7 CM2
BH CV ECHO MEAS - LV V1 MAX: 77.7 CM/SEC
BH CV ECHO MEAS - LV V1 VTI: 16.5 CM
BH CV ECHO MEAS - LVIDD: 5.2 CM
BH CV ECHO MEAS - LVIDS: 3.8 CM
BH CV ECHO MEAS - LVOT AREA: 3.8 CM2
BH CV ECHO MEAS - LVOT DIAM: 2.2 CM
BH CV ECHO MEAS - LVPWD: 1 CM
BH CV ECHO MEAS - MED PEAK E' VEL: 12.1 CM/SEC
BH CV ECHO MEAS - MR MAX PG: 37.5 MMHG
BH CV ECHO MEAS - MR MAX VEL: 306 CM/SEC
BH CV ECHO MEAS - MV A MAX VEL: 60 CM/SEC
BH CV ECHO MEAS - MV DEC SLOPE: 733 CM/SEC2
BH CV ECHO MEAS - MV DEC TIME: 0.15 SEC
BH CV ECHO MEAS - MV E MAX VEL: 107 CM/SEC
BH CV ECHO MEAS - MV E/A: 1.78
BH CV ECHO MEAS - MV MAX PG: 8.9 MMHG
BH CV ECHO MEAS - MV MEAN PG: 4 MMHG
BH CV ECHO MEAS - MV V2 VTI: 26.5 CM
BH CV ECHO MEAS - MVA(VTI): 2.37 CM2
BH CV ECHO MEAS - PA ACC TIME: 0.14 SEC
BH CV ECHO MEAS - PA V2 MAX: 71.7 CM/SEC
BH CV ECHO MEAS - PI END-D VEL: 97.7 CM/SEC
BH CV ECHO MEAS - RAP SYSTOLE: 10 MMHG
BH CV ECHO MEAS - RVSP: 31.5 MMHG
BH CV ECHO MEAS - SV(LVOT): 62.7 ML
BH CV ECHO MEAS - SV(MOD-SP2): 33.6 ML
BH CV ECHO MEAS - SV(MOD-SP4): 60.1 ML
BH CV ECHO MEAS - SVI(LVOT): 31 ML/M2
BH CV ECHO MEAS - SVI(MOD-SP2): 16.6 ML/M2
BH CV ECHO MEAS - SVI(MOD-SP4): 29.7 ML/M2
BH CV ECHO MEAS - TAPSE (>1.6): 1.89 CM
BH CV ECHO MEAS - TR MAX PG: 21.5 MMHG
BH CV ECHO MEAS - TR MAX VEL: 232 CM/SEC
BH CV ECHO MEASUREMENTS AVERAGE E/E' RATIO: 7.81
BILIRUB SERPL-MCNC: 0.3 MG/DL (ref 0–1.2)
BUN SERPL-MCNC: 20.2 MG/DL (ref 8–23)
BUN/CREAT SERPL: 18.5 (ref 7–25)
CALCIUM SPEC-SCNC: 7.7 MG/DL (ref 8.6–10.5)
CHLORIDE SERPL-SCNC: 108 MMOL/L (ref 98–107)
CO2 SERPL-SCNC: 17.2 MMOL/L (ref 22–29)
CREAT SERPL-MCNC: 1.09 MG/DL (ref 0.76–1.27)
EGFRCR SERPLBLD CKD-EPI 2021: 68.6 ML/MIN/1.73
GLOBULIN UR ELPH-MCNC: 2.4 GM/DL
GLUCOSE SERPL-MCNC: 92 MG/DL (ref 65–99)
LEFT ATRIUM VOLUME INDEX: 21.3 ML/M2
LV EF BIPLANE MOD: 51.4 %
MAGNESIUM SERPL-MCNC: 2.1 MG/DL (ref 1.6–2.4)
POTASSIUM SERPL-SCNC: 4.2 MMOL/L (ref 3.5–5.2)
PROT SERPL-MCNC: 5.6 G/DL (ref 6–8.5)
QT INTERVAL: 272 MS
QTC INTERVAL: 310 MS
SODIUM SERPL-SCNC: 137 MMOL/L (ref 136–145)
UFH PPP CHRO-ACNC: 0.43 IU/ML (ref 0.3–0.7)
UFH PPP CHRO-ACNC: 0.49 IU/ML (ref 0.3–0.7)

## 2025-07-26 PROCEDURE — 93306 TTE W/DOPPLER COMPLETE: CPT | Performed by: SPECIALIST

## 2025-07-26 PROCEDURE — 93306 TTE W/DOPPLER COMPLETE: CPT

## 2025-07-26 PROCEDURE — 25010000002 HEPARIN (PORCINE) PER 1000 UNITS: Performed by: STUDENT IN AN ORGANIZED HEALTH CARE EDUCATION/TRAINING PROGRAM

## 2025-07-26 PROCEDURE — 85520 HEPARIN ASSAY: CPT | Performed by: STUDENT IN AN ORGANIZED HEALTH CARE EDUCATION/TRAINING PROGRAM

## 2025-07-26 PROCEDURE — 93010 ELECTROCARDIOGRAM REPORT: CPT | Performed by: SPECIALIST

## 2025-07-26 PROCEDURE — 25810000003 LACTATED RINGERS PER 1000 ML: Performed by: STUDENT IN AN ORGANIZED HEALTH CARE EDUCATION/TRAINING PROGRAM

## 2025-07-26 PROCEDURE — 25010000002 METOPROLOL TARTRATE 5 MG/5ML SOLUTION: Performed by: STUDENT IN AN ORGANIZED HEALTH CARE EDUCATION/TRAINING PROGRAM

## 2025-07-26 PROCEDURE — 85520 HEPARIN ASSAY: CPT

## 2025-07-26 PROCEDURE — 25010000002 HEPARIN (PORCINE) 25000-0.45 UT/250ML-% SOLUTION: Performed by: STUDENT IN AN ORGANIZED HEALTH CARE EDUCATION/TRAINING PROGRAM

## 2025-07-26 PROCEDURE — 25010000002 DIGOXIN PER 500 MCG: Performed by: STUDENT IN AN ORGANIZED HEALTH CARE EDUCATION/TRAINING PROGRAM

## 2025-07-26 PROCEDURE — 25010000002 LABETALOL 5 MG/ML SOLUTION: Performed by: STUDENT IN AN ORGANIZED HEALTH CARE EDUCATION/TRAINING PROGRAM

## 2025-07-26 PROCEDURE — 25810000003 LACTATED RINGERS SOLUTION: Performed by: STUDENT IN AN ORGANIZED HEALTH CARE EDUCATION/TRAINING PROGRAM

## 2025-07-26 PROCEDURE — 99233 SBSQ HOSP IP/OBS HIGH 50: CPT | Performed by: STUDENT IN AN ORGANIZED HEALTH CARE EDUCATION/TRAINING PROGRAM

## 2025-07-26 PROCEDURE — 25010000002 AMIODARONE IN DEXTROSE 5% 360-4.14 MG/200ML-% SOLUTION: Performed by: STUDENT IN AN ORGANIZED HEALTH CARE EDUCATION/TRAINING PROGRAM

## 2025-07-26 PROCEDURE — 93005 ELECTROCARDIOGRAM TRACING: CPT

## 2025-07-26 RX ORDER — DILTIAZEM HYDROCHLORIDE 180 MG/1
180 CAPSULE, COATED, EXTENDED RELEASE ORAL NIGHTLY
Status: CANCELLED | OUTPATIENT
Start: 2025-07-26

## 2025-07-26 RX ORDER — DIGOXIN 0.25 MG/ML
250 INJECTION INTRAMUSCULAR; INTRAVENOUS ONCE
Status: COMPLETED | OUTPATIENT
Start: 2025-07-26 | End: 2025-07-26

## 2025-07-26 RX ORDER — SODIUM CHLORIDE, SODIUM LACTATE, POTASSIUM CHLORIDE, CALCIUM CHLORIDE 600; 310; 30; 20 MG/100ML; MG/100ML; MG/100ML; MG/100ML
75 INJECTION, SOLUTION INTRAVENOUS CONTINUOUS
Status: ACTIVE | OUTPATIENT
Start: 2025-07-26 | End: 2025-07-27

## 2025-07-26 RX ORDER — LABETALOL HYDROCHLORIDE 5 MG/ML
10 INJECTION, SOLUTION INTRAVENOUS EVERY 6 HOURS PRN
Status: DISCONTINUED | OUTPATIENT
Start: 2025-07-26 | End: 2025-07-31 | Stop reason: HOSPADM

## 2025-07-26 RX ORDER — PANTOPRAZOLE SODIUM 40 MG/10ML
40 INJECTION, POWDER, LYOPHILIZED, FOR SOLUTION INTRAVENOUS
Status: DISCONTINUED | OUTPATIENT
Start: 2025-07-26 | End: 2025-07-31 | Stop reason: HOSPADM

## 2025-07-26 RX ORDER — LOSARTAN POTASSIUM 50 MG/1
100 TABLET ORAL DAILY
Status: CANCELLED | OUTPATIENT
Start: 2025-07-26

## 2025-07-26 RX ORDER — METOPROLOL TARTRATE 1 MG/ML
5 INJECTION, SOLUTION INTRAVENOUS EVERY 6 HOURS PRN
Status: DISCONTINUED | OUTPATIENT
Start: 2025-07-26 | End: 2025-07-31 | Stop reason: HOSPADM

## 2025-07-26 RX ORDER — HEPARIN SODIUM 5000 [USP'U]/ML
2000 INJECTION, SOLUTION INTRAVENOUS; SUBCUTANEOUS ONCE
Status: COMPLETED | OUTPATIENT
Start: 2025-07-26 | End: 2025-07-26

## 2025-07-26 RX ADMIN — HEPARIN SODIUM 2000 UNITS: 5000 INJECTION, SOLUTION INTRAVENOUS; SUBCUTANEOUS at 01:21

## 2025-07-26 RX ADMIN — SODIUM CHLORIDE, POTASSIUM CHLORIDE, SODIUM LACTATE AND CALCIUM CHLORIDE 1000 ML: 600; 310; 30; 20 INJECTION, SOLUTION INTRAVENOUS at 09:21

## 2025-07-26 RX ADMIN — SODIUM CHLORIDE, POTASSIUM CHLORIDE, SODIUM LACTATE AND CALCIUM CHLORIDE 75 ML/HR: 600; 310; 30; 20 INJECTION, SOLUTION INTRAVENOUS at 23:24

## 2025-07-26 RX ADMIN — AMIODARONE HYDROCHLORIDE 0.5 MG/MIN: 1.8 INJECTION, SOLUTION INTRAVENOUS at 05:54

## 2025-07-26 RX ADMIN — DIGOXIN 250 MCG: 0.25 INJECTION INTRAMUSCULAR; INTRAVENOUS at 09:22

## 2025-07-26 RX ADMIN — PANTOPRAZOLE SODIUM 40 MG: 40 INJECTION, POWDER, FOR SOLUTION INTRAVENOUS at 08:44

## 2025-07-26 RX ADMIN — PANTOPRAZOLE SODIUM 40 MG: 40 INJECTION, POWDER, FOR SOLUTION INTRAVENOUS at 18:32

## 2025-07-26 RX ADMIN — Medication 10 ML: at 20:49

## 2025-07-26 RX ADMIN — Medication 10 ML: at 08:44

## 2025-07-26 RX ADMIN — AMIODARONE HYDROCHLORIDE 0.5 MG/MIN: 1.8 INJECTION, SOLUTION INTRAVENOUS at 20:49

## 2025-07-26 RX ADMIN — LABETALOL HYDROCHLORIDE 10 MG: 5 INJECTION, SOLUTION INTRAVENOUS at 17:55

## 2025-07-26 RX ADMIN — METOPROLOL TARTRATE 5 MG: 1 INJECTION, SOLUTION INTRAVENOUS at 15:29

## 2025-07-26 RX ADMIN — HEPARIN SODIUM 13.6 UNITS/KG/HR: 10000 INJECTION, SOLUTION INTRAVENOUS at 16:46

## 2025-07-26 RX ADMIN — METOPROLOL TARTRATE 5 MG: 1 INJECTION, SOLUTION INTRAVENOUS at 09:21

## 2025-07-26 NOTE — PROGRESS NOTES
[facility] HOSPITALIST PROGRESS NOTE     Patient Identification:  Name:  rCow Zeng  Age:  79 y.o.  Sex:  male  :  1945  MRN:  [id number]  Visit Number:  [id number]  ROOM: [place] [address]     Primary Care Provider:  Ruby Choudhary APRN    Length of stay in inpatient status:  1    Subjective     Chief Compliant:    Chief Complaint   Patient presents with    Constipation    Rapid Heart Rate       History of Presenting Illness:    Patient seen in follow-up for new onset A-fib with RVR and small bowel obstruction.  This morning he says he feels better, passing gas but no bowel movement.  He denies any chest pain/shortness of breath or palpitations.  He denies any nausea or vomiting.    Objective     Current Hospital Meds:pantoprazole, 40 mg, Intravenous, BID AC  sodium chloride, 10 mL, Intravenous, Q12H    amiodarone, 0.5 mg/min, Last Rate: 0.5 mg/min (25 0554)  amiodarone, 0.5 mg/min   Followed by  amiodarone, 0.5 mg/min  heparin, 13.6 Units/kg/hr (Dosing Weight), Last Rate: 13.6 Units/kg/hr (25 0123)  lactated ringers, 75 mL/hr, Last Rate: 75 mL/hr (25 1852)  Pharmacy to Dose Heparin,         Current Antimicrobial Therapy:  Anti-Infectives (From admission, onward)      None          Current Diuretic Therapy:  Diuretics (From admission, onward)      None          ----------------------------------------------------------------------------------------------------------------------  Vital Signs:  Temp:  [97.4 °F (36.3 °C)-98.3 °F (36.8 °C)] 97.8 °F (36.6 °C)  Heart Rate:  [] 112  Resp:  [10-] 25  BP: ()/() 127/103  SpO2:  [85 %-99 %] 98 %  on   ;   Device (Oxygen Therapy): room air  Body mass index is 28.13 kg/m².    Wt Readings from Last 3 Encounters:   25 86.4 kg (190 lb 7.6 oz)   25 90.4 kg (199 lb 6.4 oz)   10/09/24 90.3 kg (199 lb)     Intake & Output (last 3 days)          07 07 07 07 07 07  07/26 0701  07/27 0700    P.O.   0 0    I.V. (mL/kg)   100 (1.2)     Total Intake(mL/kg)   100 (1.2) 0 (0)    Urine (mL/kg/hr)   800 500 (1.2)    Total Output   800 500    Net   -700 -500                  NPO Diet NPO Type: Ice Chips  ----------------------------------------------------------------------------------------------------------------------  Physical exam:  Constitutional: Elderly male, nontoxic, speaking clearly in full sentences, Well-developed and well-nourished, resting comfortably in bed, no acute distress.  HENT:  Head:  Normocephalic and atraumatic.  Mouth:  Moist mucous membranes.  Eyes:  Conjunctivae and EOM are normal. No scleral icterus.   Cardiovascular: Irregularly irregular and normal heart sounds with no murmur. No JVD.   Pulmonary/Chest:  No respiratory distress, no wheezes, no crackles, with normal breath sounds and good air movement. Unlabored. No accessory muscle use.  Abdominal: Distended, soft, bowel sounds present, nontender  Musculoskeletal:  No tenderness, and no deformity.  No red or swollen joints anywhere.    Neurological:  Alert and oriented to person, place, and time.  No cranial nerve deficit.   Nonfocal.   Skin:  Skin is warm and dry. No rash noted. No pallor.   Peripheral vascular:  No clubbing, no cyanosis, no edema. Pedal and tibial pulses 2 out of 4 bilaterally.     ----------------------------------------------------------------------------------------------------------------------  Results from last 7 days   Lab Units 07/25/25 2315 07/25/25  1235   LACTATE mmol/L  --  1.4   WBC 10*3/mm3 9.13 9.64   HEMOGLOBIN g/dL 13.4 14.5   HEMATOCRIT % 43.3 44.8   MCV fL 97.7* 94.1   MCHC g/dL 30.9* 32.4   PLATELETS 10*3/mm3 224 297   INR  1.12* 1.05         Results from last 7 days   Lab Units 07/25/25 2315 07/25/25  1235   SODIUM mmol/L 137 136   POTASSIUM mmol/L 4.2 4.3   MAGNESIUM mg/dL 2.1 2.1   CHLORIDE mmol/L 108* 102   CO2 mmol/L 17.2* 20.2*   BUN mg/dL 20.2 25.7*  "  CREATININE mg/dL 1.09 1.48*   CALCIUM mg/dL 7.7* 8.9   GLUCOSE mg/dL 92 112*   ALBUMIN g/dL 3.2* 4.2   BILIRUBIN mg/dL 0.3 0.9   ALK PHOS U/L 68 87   AST (SGOT) U/L 18 20   ALT (SGPT) U/L 8 10   Estimated Creatinine Clearance: 58.9 mL/min (by C-G formula based on SCr of 1.09 mg/dL).  No results found for: \"AMMONIA\"  Results from last 7 days   Lab Units 07/25/25  1421 07/25/25  1235   CK TOTAL U/L 134  --    HSTROP T ng/L 17 22*         Results from last 7 days   Lab Units 07/25/25  1421   CHOLESTEROL mg/dL 125   TRIGLYCERIDES mg/dL 102   HDL CHOL mg/dL 38*   LDL CHOL mg/dL 68     Hemoglobin A1C   Date/Time Value Ref Range Status   07/25/2025 1235 6.06 (H) 4.80 - 5.60 % Final     Lab Results   Component Value Date    TSH 1.330 07/25/2025     No results found for: \"PREGTESTUR\", \"PREGSERUM\", \"HCG\", \"HCGQUANT\"  Pain Management Panel           No data to display              Brief Urine Lab Results  (Last result in the past 365 days)        Color   Clarity   Blood   Leuk Est   Nitrite   Protein   CREAT   Urine HCG        07/25/25 1422 Yellow   Clear   Negative   Negative   Negative   Negative                 No results found for: \"BLOODCX\"  No results found for: \"URINECX\"  No results found for: \"WOUNDCX\"  No results found for: \"STOOLCX\"  No results found for: \"RESPCX\"  No results found for: \"AFBCX\"  Results from last 7 days   Lab Units 07/25/25  1235   LACTATE mmol/L 1.4       I have personally looked at the labs and they are summarized above.  ----------------------------------------------------------------------------------------------------------------------  Detailed radiology reports for the last 24 hours:  Imaging Results (Last 24 Hours)       Procedure Component Value Units Date/Time    CT Abdomen Pelvis With Contrast [[id number]] Collected: 07/25/25 1353     Updated: 07/25/25 1358    Narrative:      EXAM:    CT Abdomen and Pelvis With Intravenous Contrast     EXAM DATE:    7/25/2025 1:53 PM     CLINICAL " HISTORY:    Abdominal distention with nausea and periumbilical abdominal pain.     TECHNIQUE:    Axial computed tomography images of the abdomen and pelvis with  intravenous contrast.  Sagittal and coronal reformatted images were  created and reviewed.  This CT exam was performed using one or more of  the following dose reduction techniques:  automated exposure control,  adjustment of the mA and/or kV according to patient size, and/or use of  iterative reconstruction technique.     COMPARISON:    No relevant prior studies available.     FINDINGS:    Lung bases:  Unremarkable as visualized.  No mass.  No consolidation.      ABDOMEN:    Liver:  Unremarkable as visualized.  No mass.    Gallbladder and bile ducts:  Gallstones.  Gallbladder otherwise  unremarkable.  No ductal dilation.    Pancreas:  Unremarkable as visualized.  No mass.  No ductal dilation.    Spleen:  Unremarkable as visualized.  No splenomegaly.    Adrenals:  Unremarkable as visualized.  No mass.    Kidneys and ureters:  Right simple appearing renal cyst measuring 5.9  cm. Left renal collecting system renal cyst measuring 5.6 cm also  appearing simple.  No hydronephrosis.    Stomach and bowel:  Numerous loops of fluid-filled distended small  bowel with the largest in the left upper lower quadrant measuring about  4.4 cm with caliber transition point somewhere within the right mid  quadrants nonspecific. This could represent early or partial small bowel  obstruction.  Scattered diverticula in the colon.  No diverticulitis.      PELVIS:    Appendix:  The appendix is not definitively identified.    Bladder:  Unremarkable as visualized.  No mass.    Reproductive:  Unremarkable as visualized.      ABDOMEN and PELVIS:    Intraperitoneal space:  Unremarkable as visualized.  No free air.  No  significant fluid collection.    Bones/joints:  No acute fracture.  No dislocation.    Soft tissues:  Small umbilical hernia containing only fat.    Vasculature:   Unremarkable as visualized.  No abdominal aortic  aneurysm.    Lymph nodes:  Unremarkable as visualized.  No enlarged lymph nodes.       Impression:      1.  Numerous loops of fluid-filled distended small bowel with the  largest in the left upper lower quadrant measuring about 4.4 cm with  caliber transition point somewhere within the right mid quadrants  nonspecific. This could represent early or partial small bowel  obstruction.  2.  Gallstones.  Gallbladder otherwise unremarkable.  3.  Right simple appearing renal cyst measuring 5.9 cm. Left renal  collecting system renal cyst measuring 5.6 cm also appearing simple.     This report was finalized on 7/25/2025 1:56 PM by Dr. Doug Maritnez MD.       XR Chest 1 View [[id number]] Collected: 07/25/25 1350     Updated: 07/25/25 1353    Narrative:      EXAM:    XR Chest, 1 View     EXAM DATE:    7/25/2025 1:50 PM     CLINICAL HISTORY:    Tachycardia     TECHNIQUE:    Frontal view of the chest.     COMPARISON:    No relevant prior studies available.     FINDINGS:    Lungs and pleural spaces:  Left diaphragm not well seen and possibly  due to small left pleural effusion or atelectasis.  No pneumothorax.    Heart:  Unremarkable as visualized.  No cardiomegaly.    Mediastinum:  Unremarkable as visualized.  Normal mediastinal contour.    Bones/joints:  Unremarkable as visualized.  No acute fracture.       Impression:        Left diaphragm not well seen and possibly due to small left pleural  effusion or atelectasis.     This report was finalized on 7/25/2025 1:51 PM by Dr. Doug Martinez MD.             Assessment & Plan      Patient is an 79-year-old male with history significant for prostate cancer, BPH and essential hypertension who presented to the ER complaining of abdominal distention and pain.    #New onset atrial flutter with RVR  #Essential hypertension  --Patient denies any chest pain/pressure tightness or palpitations leading up to presentation or during initial  presentation in the ER in which she was noted to be in new onset atrial flutter.  Initially hypotensive with heart rates in the 150s to 160s  --Initial troponin 22, repeat 17  --EKG with atrial flutter with RVR without ischemic changes  --A1c/TSH/lipid panel reviewed  --Echo with an EF of 51 to 55%, no significant valvular abnormalities  --Additional dose of IV dig x 1  --Additional IV LR bolus x 1  --Continue heparin GTT until able to transition to p.o. Eliquis  --Continue amiodarone for another 24 hours at 0.5 mg/min, ultimately will transition to either metoprolol or Cardizem when able to tolerate p.o.  --Continue to monitor on PCU    #Partial small bowel obstruction  --Patient had been constipated several days leading up to presentation, multiple episodes of nausea/vomiting which had subsided prior to being evaluated in the ER.  Only significant surgical history was remote appendectomy.  --Unremarkable colonoscopy 5 years ago  --Admit labs unremarkable  --CT abdomen pelvis with contrast noted dilated distended small bowel with transition point somewhere in the right mid quadrant  --Avoid NG tube unless he becomes nauseous with emesis as he had no significant gastric content on the CT  --Antiemetics, opiates for symptoms if needed, PPI IV twice daily  --N.p.o. except for ice chips  --Continue to monitor for return of bowel function, once he has a bowel movement will start clear liquids and advance as tolerated    #Acute kidney injury due to prerenal azotemia, resolved with IVF  #Prostate cancer  #BPH  #Simple renal cyst    Copied portions of this report have been reviewed and are accurate as of 07/26/25     CHECKLIST:  Abx: None  VTE: Heparin GTT  GI ppx: PPI  Diet: Ice chips only  Code: CPR, full  Dispo: Patient is high risk due to new onset atrial flutter with RVR, partial SBO requiring PCU level of care.  Anticipate 24 to 48 hours pending clinical course.  Dispo Home when medically stable.    Delvis [place]  DO Sanjeev  [facility] ERist  07/26/25  12:00 EDT

## 2025-07-26 NOTE — PAYOR COMM NOTE
"CONTACT: JOLIE BASHIR RN  UTILIZATION MANAGEMENT DEPT.  [facility]  [address] [place] [address]  PHONE: [phone]  FAX: [phone]      INPATIENT AUTH REQUEST    Crow Zeng (79 y.o. Male)       Date of Birth   1945    Social Security Number       Address   [address] [place] [facility] [address]    Home Phone   [phone]    MRN   [id number]       Jainism   Non-Samaritan    Marital Status                               Admission Date   7/25/2025    Admission Type   Emergency    Admitting Provider   Delvis Pace DO    Attending Provider   Delvis Pace DO    Department, Room/Bed   [facility] PROGRESS CARE, P209/S2       Discharge Date       Discharge Disposition       Discharge Destination                                 Attending Provider: Delvis Pace DO    Allergies: Doxazosin, Flomax [Tamsulosin]    Isolation: None   Infection: None   Code Status: CPR    Ht: 175.3 cm (69\")   Wt: 86.4 kg (190 lb 7.6 oz)    Admission Cmt: None   Principal Problem: Atrial fibrillation with RVR [I48.91]                   Active Insurance as of 7/25/2025       Primary Coverage       Payor Plan Insurance Group Employer/Plan Group    HUMANA MEDICARE REPLACEMENT HUMANA MEDICARE ADVANTAGE PPO [id number]       Payor Plan Address Payor Plan Phone Number Payor Plan Fax Number Effective Dates    [address] [address] [phone]  1/1/2023 - None Entered    [facility] [id number]         Subscriber Name Subscriber Birth Date Member ID       CROW ZENG 1945 [id number]                     Emergency Contacts        (Rel.) Home Phone Work Phone Mobile Phone    Josefina Zeng (Spouse) [phone] -- --                 History & Physical        Kaylie Chappell PA-C at 07/25/25 1607       Attestation signed by Delvis Pace DO at 07/25/25 1840      I have evaluated the patient independently, I have reviewed H&P, all labs and " images from today and evaluated the patient at bedside.  I have discussed w/ JOSSELINE Grant and agree.  Patient had an unremarkable colonoscopy 5 years ago.  He is status post appendectomy.  He is currently not nauseous with no emesis therefore will avoid NG tube.  IV fluids, antiemetics and pain meds (which she has not required yet).  He has started to pass gas since presentation and has good bowel sounds.  Hold on general surgery consult, will consult if indicated.    From a new onset atrial fibrillation standpoint, we will continue amiodarone at this time with plans to transition to beta-blocker when able to tolerate p.o.  Continue heparin GTT. admit to PCU.    Electronically signed by Delvis Pace DO, 25, 6:40 PM EDT.                               [facility]  HISTORY & PHYSICAL    Patient Identification:  Name:  Crow Zeng  Age:  79 y.o.  Sex:  male  :  1945  MRN:  [id number]   Visit Number:  [id number]  Admit Date: 2025   Primary Care Physician:  Ruby Choudhary APRN     Subjective     Chief complaint:   Chief Complaint   Patient presents with    Constipation    Rapid Heart Rate     History of presenting illness:   Patient is a 79 y.o. male with past medical history significant for prostate cancer, BPH, hypertension that presented to the [facility] emergency department for evaluation of constipation and rapid heart rate.    Patient presented to the ED today complaining of abdominal distention and pain.  He saw his PCP prior to arrival to the ED for the symptoms as well as tachycardia, and they recommended him go to the emergency department.  He reports he actually felt somewhat better this morning abdominal pain wise, but due to his fast heart rate he was instructed to come to the emergency department.He states he had no prior history of atrial fibrillation.  He states he has been on Cardizem for many years for blood pressure  control.  He states he began feeling unwell Tuesday.  He states he had some more vomiting on Wednesday and Thursday, but no more vomiting today.  He states he has been passing gas and had liquid bowel movements over the past several days.  Denies any constipation.  Denies any chest pain.    Upon arrival to the ED, vitals were temperature 97.7, , RR 20, /75, SpO2 98% on room air.  Did have brief episode of hypotension, 88/62, but since improved.  Heart rate now 78.  Received adenosine, diltiazem, and amiodarone in the ED.  Placed on amiodarone drip.Initial troponin 22, repeat 17.  BUN 25.7, creatinine 1.48.  Hemoglobin 6.06. CT abdomen/pelvis shows fluid-filled distended small bowel with largest area in left upper/lower quadrant, caliber transition point within the right mid quadrants.  Early/partial small bowel obstruction.    Patient has been admitted to the PCU  ---------------------------------------------------------------------------------------------------------------------   Review of Systems   Constitutional:  Negative for chills and fever.   Respiratory:  Negative for cough and shortness of breath.    Cardiovascular:  Negative for chest pain and palpitations.   Gastrointestinal:  Positive for abdominal pain, nausea and vomiting. Negative for constipation and diarrhea.   Genitourinary:  Negative for difficulty urinating and dysuria.   Musculoskeletal:  Negative for arthralgias and myalgias.   Skin:  Negative for rash and wound.   Neurological:  Negative for dizziness, weakness and light-headedness.   Psychiatric/Behavioral:  Negative for agitation and confusion.       ---------------------------------------------------------------------------------------------------------------------   Past Medical History:   Diagnosis Date    BPH with elevated PSA 6/9/2023    Elevated PSA     Hypertension      Past Surgical History:   Procedure Laterality Date    APPENDECTOMY      SKIN GRAFT       Family History    Problem Relation Age of Onset    No Known Problems Father     Cancer Mother      Social History     Socioeconomic History    Marital status:    Tobacco Use    Smoking status: Never    Smokeless tobacco: Never   Vaping Use    Vaping status: Never Used   Substance and Sexual Activity    Alcohol use: Never    Drug use: Never    Sexual activity: Defer     ---------------------------------------------------------------------------------------------------------------------   Allergies:  Patient has no known allergies.  ---------------------------------------------------------------------------------------------------------------------   Medications below are reported home medications pulling from within the system; at this time, these medications have not been reconciled unless otherwise specified and are in the verification process for further verifcation as current home medications.    Prior to Admission Medications       Prescriptions Last Dose Informant Patient Reported? Taking?    dilTIAZem CD (CARDIZEM CD) 180 MG 24 hr capsule   Yes No    Take 1 capsule by mouth Daily.    losartan (COZAAR) 100 MG tablet   Yes No    Take 1 tablet by mouth Daily.    pravastatin (PRAVACHOL) 20 MG tablet   Yes No    Take 1 tablet by mouth Daily.    predniSONE (DELTASONE) 2.5 MG tablet   Yes No    tadalafil (CIALIS) 5 MG tablet   No No    Take 1 tablet by mouth Daily.          ---------------------------------------------------------------------------------------------------------------------    Objective     Hospital Scheduled Meds:       amiodarone, 1 mg/min, Last Rate: 1 mg/min (07/25/25 1402)   Followed by  amiodarone, 0.5 mg/min  heparin, 11.6 Units/kg/hr (Dosing Weight), Last Rate: 11.6 Units/kg/hr (07/25/25 1716)  Pharmacy to Dose Heparin,         Current listed hospital scheduled medications may not yet reflect those currently placed in orders that are signed and held, awaiting patient's arrival to floor/unit.     ---------------------------------------------------------------------------------------------------------------------   Vital Signs:  Temp:  [97.4 °F (36.3 °C)-97.7 °F (36.5 °C)] 97.4 °F (36.3 °C)  Heart Rate:  [] 78  Resp:  [20-23] 23  BP: ()/() 151/87  Mean Arterial Pressure (Non-Invasive) for the past 24 hrs (Last 3 readings):   Noninvasive MAP (mmHg)   07/25/25 1730 115   07/25/25 1701 113   07/25/25 1620 113     SpO2 Percentage    07/25/25 1715 07/25/25 1730 07/25/25 1745   SpO2: 98% 95% 95%     SpO2:  [90 %-99 %] 95 %  on   ;   Device (Oxygen Therapy): room air    Body mass index is 27.74 kg/m².  Wt Readings from Last 3 Encounters:   07/25/25 85.2 kg (187 lb 13.3 oz)   04/09/25 90.4 kg (199 lb 6.4 oz)   10/09/24 90.3 kg (199 lb)     ---------------------------------------------------------------------------------------------------------------------   Physical Exam:  Physical Exam  Constitutional:       General: He is not in acute distress.     Appearance: Normal appearance.   HENT:      Head: Normocephalic and atraumatic.      Right Ear: External ear normal.      Left Ear: External ear normal.      Nose: No nasal deformity.      Mouth/Throat:      Lips: Pink.      Mouth: Mucous membranes are moist.   Eyes:      Conjunctiva/sclera: Conjunctivae normal.      Pupils: Pupils are equal, round, and reactive to light.   Cardiovascular:      Rate and Rhythm: Normal rate. Rhythm irregularly irregular.      Pulses:           Dorsalis pedis pulses are 2+ on the right side and 2+ on the left side.      Heart sounds: Normal heart sounds.   Pulmonary:      Effort: Pulmonary effort is normal.      Breath sounds: Normal breath sounds. No wheezing, rhonchi or rales.   Abdominal:      General: Abdomen is flat. Bowel sounds are decreased. There is distension.      Palpations: Abdomen is soft.      Tenderness: There is no guarding or rebound.   Genitourinary:     Comments: No simon catheter in place  "  Musculoskeletal:      Cervical back: Neck supple. Normal range of motion.      Right lower leg: No edema.      Left lower leg: No edema.   Skin:     General: Skin is warm and dry.   Neurological:      General: No focal deficit present.      Mental Status: He is alert and oriented to person, place, and time.   Psychiatric:         Mood and Affect: Mood normal.         Behavior: Behavior normal.       ---------------------------------------------------------------------------------------------------------------------  EKG:        I have personally reviewed the EKG/Telemetry strip  ---------------------------------------------------------------------------------------------------------------------   Results from last 7 days   Lab Units 07/25/25  1421 07/25/25  1235   CK TOTAL U/L 134  --    HSTROP T ng/L 17 22*       Results from last 7 days   Lab Units 07/25/25  1421   CHOLESTEROL mg/dL 125   TRIGLYCERIDES mg/dL 102   HDL CHOL mg/dL 38*   LDL CHOL mg/dL 68       Results from last 7 days   Lab Units 07/25/25  1235   LACTATE mmol/L 1.4   WBC 10*3/mm3 9.64   HEMOGLOBIN g/dL 14.5   HEMATOCRIT % 44.8   MCV fL 94.1   MCHC g/dL 32.4   PLATELETS 10*3/mm3 297   INR  1.05     Results from last 7 days   Lab Units 07/25/25  1235   SODIUM mmol/L 136   POTASSIUM mmol/L 4.3   MAGNESIUM mg/dL 2.1   CHLORIDE mmol/L 102   CO2 mmol/L 20.2*   BUN mg/dL 25.7*   CREATININE mg/dL 1.48*   CALCIUM mg/dL 8.9   GLUCOSE mg/dL 112*   ALBUMIN g/dL 4.2   BILIRUBIN mg/dL 0.9   ALK PHOS U/L 87   AST (SGOT) U/L 20   ALT (SGPT) U/L 10   Estimated Creatinine Clearance: 43.1 mL/min (A) (by C-G formula based on SCr of 1.48 mg/dL (H)).  No results found for: \"AMMONIA\"    Hemoglobin A1C   Date/Time Value Ref Range Status   07/25/2025 1235 6.06 (H) 4.80 - 5.60 % Final     Lab Results   Component Value Date    HGBA1C 6.06 (H) 07/25/2025     Lab Results   Component Value Date    TSH 1.330 07/25/2025       No results found for: \"BLOODCX\"  No results found " "for: \"URINECX\"  No results found for: \"WOUNDCX\"  No results found for: \"STOOLCX\"  No results found for: \"RESPCX\"  No results found for: \"MRSACX\"  Pain Management Panel           No data to display              I have personally reviewed the above laboratory results.   ---------------------------------------------------------------------------------------------------------------------  Imaging Results (Last 7 Days)       Procedure Component Value Units Date/Time    CT Abdomen Pelvis With Contrast [[id number]] Collected: 07/25/25 1353     Updated: 07/25/25 1354    Narrative:      EXAM:    CT Abdomen and Pelvis With Intravenous Contrast     EXAM DATE:    7/25/2025 1:53 PM     CLINICAL HISTORY:    Abdominal distention with nausea and periumbilical abdominal pain.     TECHNIQUE:    Axial computed tomography images of the abdomen and pelvis with  intravenous contrast.  Sagittal and coronal reformatted images were  created and reviewed.  This CT exam was performed using one or more of  the following dose reduction techniques:  automated exposure control,  adjustment of the mA and/or kV according to patient size, and/or use of  iterative reconstruction technique.     COMPARISON:    No relevant prior studies available.     FINDINGS:    Lung bases:  Unremarkable as visualized.  No mass.  No consolidation.      ABDOMEN:    Liver:  Unremarkable as visualized.  No mass.    Gallbladder and bile ducts:  Gallstones.  Gallbladder otherwise  unremarkable.  No ductal dilation.    Pancreas:  Unremarkable as visualized.  No mass.  No ductal dilation.    Spleen:  Unremarkable as visualized.  No splenomegaly.    Adrenals:  Unremarkable as visualized.  No mass.    Kidneys and ureters:  Right simple appearing renal cyst measuring 5.9  cm. Left renal collecting system renal cyst measuring 5.6 cm also  appearing simple.  No hydronephrosis.    Stomach and bowel:  Numerous loops of fluid-filled distended small  bowel with the largest in the left " upper lower quadrant measuring about  4.4 cm with caliber transition point somewhere within the right mid  quadrants nonspecific. This could represent early or partial small bowel  obstruction.  Scattered diverticula in the colon.  No diverticulitis.      PELVIS:    Appendix:  The appendix is not definitively identified.    Bladder:  Unremarkable as visualized.  No mass.    Reproductive:  Unremarkable as visualized.      ABDOMEN and PELVIS:    Intraperitoneal space:  Unremarkable as visualized.  No free air.  No  significant fluid collection.    Bones/joints:  No acute fracture.  No dislocation.    Soft tissues:  Small umbilical hernia containing only fat.    Vasculature:  Unremarkable as visualized.  No abdominal aortic  aneurysm.    Lymph nodes:  Unremarkable as visualized.  No enlarged lymph nodes.       Impression:      1.  Numerous loops of fluid-filled distended small bowel with the  largest in the left upper lower quadrant measuring about 4.4 cm with  caliber transition point somewhere within the right mid quadrants  nonspecific. This could represent early or partial small bowel  obstruction.  2.  Gallstones.  Gallbladder otherwise unremarkable.  3.  Right simple appearing renal cyst measuring 5.9 cm. Left renal  collecting system renal cyst measuring 5.6 cm also appearing simple.     This report was finalized on 7/25/2025 1:56 PM by Dr. Doug Martinez MD.       XR Chest 1 View [[id number]] Collected: 07/25/25 1350     Updated: 07/25/25 1353    Narrative:      EXAM:    XR Chest, 1 View     EXAM DATE:    7/25/2025 1:50 PM     CLINICAL HISTORY:    Tachycardia     TECHNIQUE:    Frontal view of the chest.     COMPARISON:    No relevant prior studies available.     FINDINGS:    Lungs and pleural spaces:  Left diaphragm not well seen and possibly  due to small left pleural effusion or atelectasis.  No pneumothorax.    Heart:  Unremarkable as visualized.  No cardiomegaly.    Mediastinum:  Unremarkable as visualized.   Normal mediastinal contour.    Bones/joints:  Unremarkable as visualized.  No acute fracture.       Impression:        Left diaphragm not well seen and possibly due to small left pleural  effusion or atelectasis.     This report was finalized on 7/25/2025 1:51 PM by Dr. Doug Martinez MD.             I have personally reviewed the above radiology results.     Last Echocardiogram:  Results for orders placed during the hospital encounter of 11/08/18    Adult Transthoracic Echo Complete W/ Cont if Necessary Per Protocol 11/11/2018  5:25 PM    Interpretation Summary  · Normal left ventricular cavity size and wall thickness noted. All left ventricular wall segments contract normally.  · Estimated EF appears to be in the range of 61 - 65%.  · The aortic valve is not well visualized. The aortic valve is grossly normal in structure. No aortic valve regurgitation is present. No aortic valve stenosis is present.  · The mitral valve is normal in structure. No mitral valve regurgitation is present. No significant mitral valve stenosis is present.  · The tricuspid valve is normal. No tricuspid valve stenosis is present. No tricuspid valve regurgitation is present.  · There is no evidence of pericardial effusion.    ---------------------------------------------------------------------------------------------------------------------    Assessment & Plan      Active Hospital Problems    Diagnosis  POA    **Atrial fibrillation with RVR [I48.91]  Yes     Atrial fibrillation with RVR, new onset, POA  Hypertension   Patient does not have a known history of atrial fibrillation   Presented in atrial fibrillation with RVR,   Received adenosine, Cardizem, and placed on amiodarone drip in the ED. Continue amiodarone, titrate dosing as needed  Plan to resume home Cardizem  Anticoagulation with heparin drip  Pharmacy to price DOAC  TTE ordered  Continue to monitor closely on telemetry   Small bowel obstruction, POA  CT abdomen/pelvis  shows fluid-filled distended small bowel with largest area in left upper/lower quadrant, caliber transition point within the right mid quadrants.  Early/partial small bowel obstruction.  Patient currently not nauseous or vomiting, passing gas and having bowel movements.  Plan to keep n.p.o. with ice chips overnight and potentially trial  IV fluid hydration  Nausea medication    F/E/N: IV LR.  Continue monitor electrolytes.  NPO sips with meds/ice chips    ---------------------------------------------------  DVT Prophylaxis: Heparin  Activity: Up with assistance    The patient is considered to be a high risk patient due to: New onset atrial fibrillation, SBO    INPATIENT status due to the need for care which can only be reasonably provided in an hospital setting such as aggressive/expedited ancillary services and/or consultation services, the necessity for IV medications, close physician monitoring and/or the possible need for procedures.  In such, I feel patient’s risk for adverse outcomes and need for care warrant INPATIENT evaluation and predict the patient’s care encounter to likely last beyond 2 midnights.      Code Status: Full code    Disposition/Discharge planning: Pending clinical course    I have discussed the patient's assessment and plan with Dr. Sanjeev Chappell PA-C  Hospitalist Service -- [facility]       07/25/25  18:24 EDT    Attending Physician: Delvis Pace*        Electronically signed by Delvis Pace DO at 07/25/25 1840          Emergency Department Notes        Tyson Soliz MD at 07/25/25 1227        Procedure Orders    1. Chemical Cardioversion [[id number]] ordered by Tyson Soliz MD                 Subjective   History of Present Illness  Patient presents today with abdominal distention and suprapubic/periumbilical pain that has been since Tuesday.  The patient's had nausea but no vomiting.  The patient feels generally  unwell.  The patient was sent in by his family doctor for the symptoms as well as tachycardia.  The patient denies any chest pain, palpitations, shortness of breath, or other cardiorespiratory symptoms.  The patient denies any other GI symptoms, except as stated above.      Review of Systems   Constitutional:  Positive for activity change, appetite change and fatigue. Negative for chills, diaphoresis and fever.   HENT: Negative.     Respiratory: Negative.     Cardiovascular: Negative.    Gastrointestinal:  Positive for abdominal distention, abdominal pain and vomiting. Negative for constipation and diarrhea.   Genitourinary: Negative.    Musculoskeletal: Negative.    Skin: Negative.    Neurological: Negative.    Psychiatric/Behavioral: Negative.         Past Medical History:   Diagnosis Date    BPH with elevated PSA 6/9/2023    Elevated PSA     Hypertension        No Known Allergies    Past Surgical History:   Procedure Laterality Date    APPENDECTOMY      SKIN GRAFT         Family History   Problem Relation Age of Onset    No Known Problems Father     Cancer Mother        Social History     Socioeconomic History    Marital status:    Tobacco Use    Smoking status: Never    Smokeless tobacco: Never   Vaping Use    Vaping status: Never Used   Substance and Sexual Activity    Alcohol use: Never    Drug use: Never    Sexual activity: Defer           Objective   Physical Exam  Vitals and nursing note reviewed.   Constitutional:       General: He is awake. He is not in acute distress.     Appearance: He is overweight. He is ill-appearing. He is not toxic-appearing or diaphoretic.   HENT:      Head: Normocephalic and atraumatic.      Mouth/Throat:      Pharynx: Oropharynx is clear. No oropharyngeal exudate or posterior oropharyngeal erythema.   Eyes:      General:         Right eye: No discharge.         Left eye: No discharge.      Extraocular Movements: Extraocular movements intact.      Conjunctiva/sclera:  Conjunctivae normal.      Pupils: Pupils are equal, round, and reactive to light.   Cardiovascular:      Rate and Rhythm: Regular rhythm. Tachycardia present.      Heart sounds: Normal heart sounds. No murmur heard.     No friction rub. No gallop.   Pulmonary:      Effort: Pulmonary effort is normal. No respiratory distress.      Breath sounds: Normal breath sounds. No stridor.   Abdominal:      General: There is distension.      Palpations: Abdomen is soft. There is no mass.      Tenderness: There is abdominal tenderness in the epigastric area and periumbilical area. There is no guarding or rebound.      Comments: Umbilical hernia.   Skin:     Coloration: Skin is pale. Skin is not jaundiced.      Findings: No bruising, erythema or rash.   Neurological:      General: No focal deficit present.      Mental Status: He is alert and oriented to person, place, and time. Mental status is at baseline.   Psychiatric:         Behavior: Behavior normal. Behavior is cooperative.         Thought Content: Thought content normal.         Judgment: Judgment normal.         Chemical Cardioversion    Date/Time: 7/25/2025 5:54 PM    Performed by: Tyson Soliz MD  Authorized by: Delvis Pace DO    Consent:     Consent obtained:  Verbal and written    Consent given by:  Patient    Risks discussed:  Death, induced arrhythmia and pain    Alternatives discussed:  No treatment, electrical cardioversion, delayed treatment, alternative treatment and observation  Pre-procedure details:     Cardioversion basis:  Emergent    Rhythm:  Atrial fibrillation  Attempt one:     Cardioversion medication:  Adenosine 6mg      Medication outcome:  No change in rhythm  Attempt two:     Cardioversion medication:  Adenosine 12mg      Shock outcome:  No change in rhythm   Post-procedure details:     Patient status:  Awake    Patient tolerance of procedure:  Tolerated well, no immediate complications  Comments:      Electronically signed  by Tyson Soliz MD, 07/25/25, 5:55 PM EDT.              ED Course  ED Course as of 07/25/25 1756   Fri Jul 25, 2025   1202 BP: 100/75 [RA]   1202 Temp: 97.7 °F (36.5 °C) [RA]   1202 Heart Rate(!): 162 [RA]   1202 Resp: 20 [RA]   1202 SpO2: 98 % [RA]   1202 Device (Oxygen Therapy): room air [RA]   1407 ECG demonstrates atrial fibrillation at rate of 119 bpm.  QRS duration is 70 ms.  QTc is 396 ms.  There are no acute ST-T wave changes. [RA]   1536 Glucose(!): 112 [RA]   1536 BUN(!): 25.7 [RA]   1536 Creatinine(!): 1.48 [RA]   1536 CO2(!): 20.2 [RA]   1536 HS Troponin T(!): 22 [RA]   1536 HS Troponin T: 17 [RA]   1536 Troponin T Numeric Delta: -5 [RA]   1536 Troponin T % Delta: -23 [RA]   1537 CT Abdomen Pelvis With Contrast  1.  Numerous loops of fluid-filled distended small bowel with the  largest in the left upper lower quadrant measuring about 4.4 cm with  caliber transition point somewhere within the right mid quadrants  nonspecific. This could represent early or partial small bowel  obstruction.  2.  Gallstones.  Gallbladder otherwise unremarkable.  3.  Right simple appearing renal cyst measuring 5.9 cm. Left renal  collecting system renal cyst measuring 5.6 cm also appearing simple.   [RA]      ED Course User Index  [RA] Tyson Soliz MD                                                       Medical Decision Making  Problems Addressed:  Atrial fibrillation with RVR: complicated acute illness or injury  Other partial intestinal obstruction: complicated acute illness or injury    Amount and/or Complexity of Data Reviewed  Labs: ordered. Decision-making details documented in ED Course.  Radiology: ordered. Decision-making details documented in ED Course.  ECG/medicine tests: ordered.    Risk  OTC drugs.  Prescription drug management.  Decision regarding hospitalization.        Final diagnoses:   Atrial fibrillation with RVR   Other partial intestinal obstruction       ED Disposition  ED Disposition        ED Disposition   Decision to Admit    Condition   --    Comment   Level of Care: Progressive Care [20]   Diagnosis: Atrial fibrillation with RVR [[id number]]   Certification: I Certify That Inpatient Hospital Services Are Medically Necessary For Greater Than 2 Midnights                 No follow-up provider specified.       Medication List      No changes were made to your prescriptions during this visit.            Tyson Soliz MD  25 1633       Tyson Soliz MD  25 1756      Electronically signed by Tyson Soliz MD at 25 1756       Ramiro Yo PCT at 25 1204          EKG performed at 11:58 and given to dr soliz at 11:59     Electronically signed by Ramiro Yo PCT at 25 1204       Facility-Administered Medications as of 2025   Medication Dose Route Frequency Provider Last Rate Last Admin    [COMPLETED] adenosine (ADENOCARD) 6 MG/2ML injection  - ADS Override Pull        Given at 25 1216    [COMPLETED] adenosine (ADENOCARD) injection 12 mg  12 mg Intravenous Once Tyson Soliz MD   12 mg at 25 1220    [COMPLETED] amiodarone 150 mg in 100 mL D5W (loading dose)  150 mg Intravenous Once Tyson Soliz MD   Stopped at 25 1324    Followed by    [] amiodarone 360 mg in 200 mL D5W infusion  1 mg/min Intravenous Continuous Delvis Pace DO   Stopped at 25 1850    Followed by    amiodarone 360 mg in 200 mL D5W infusion  0.5 mg/min Intravenous Continuous Delvis Pace DO 16.67 mL/hr at 25 0554 0.5 mg/min at 25 0554    [COMPLETED] digoxin (LANOXIN) injection 250 mcg  250 mcg Intravenous Once Delvis Pace DO   250 mcg at 25 0922    [COMPLETED] dilTIAZem (CARDIZEM) 1mg/mL infusion (add-vantage) solution  - ADS Override Pull        Stopped at 25 1242    [COMPLETED] dilTIAZem (CARDIZEM) injection 10 mg  10 mg Intravenous Once Tyson Soliz MD    10 mg at 07/25/25 1223    heparin (porcine) 5000 UNIT/ML injection 2,000 Units  2,000 Units Intravenous PRN Delvis Pace DO        [COMPLETED] heparin (porcine) 5000 UNIT/ML injection 2,000 Units  2,000 Units Intravenous Once Delvis Pace DO   2,000 Units at 07/26/25 0121    heparin 64930 units/250 mL (100 units/mL) in 0.45 % NaCl infusion  13.6 Units/kg/hr (Dosing Weight) Intravenous Titrated Delvis Pace DO 11.72 mL/hr at 07/26/25 0123 13.6 Units/kg/hr at 07/26/25 0123    [COMPLETED] iopamidol (ISOVUE-300) 61 % injection 85 mL  85 mL Intravenous Once in imaging Tyson Soliz MD   85 mL at 07/25/25 1341    [COMPLETED] lactated ringers bolus 1,000 mL  1,000 mL Intravenous Once Delvis Pace DO 1,000 mL/hr at 07/26/25 0921 1,000 mL at 07/26/25 0921    lactated ringers infusion  75 mL/hr Intravenous Continuous Delvis Pace DO 75 mL/hr at 07/25/25 1852 75 mL/hr at 07/25/25 1852    metoprolol tartrate (LOPRESSOR) injection 5 mg  5 mg Intravenous Q6H PRN Delvis Pace DO   5 mg at 07/26/25 0921    morphine injection 2 mg  2 mg Intravenous Q3H PRN Delvis Pace DO        nitroglycerin (NITROSTAT) SL tablet 0.4 mg  0.4 mg Sublingual Q5 Min PRN Delvis Pace DO        ondansetron (ZOFRAN) injection 4 mg  4 mg Intravenous Q6H PRN Delvis Pace DO        pantoprazole (PROTONIX) injection 40 mg  40 mg Intravenous QAM AC Delvis Pace DO   40 mg at 07/26/25 0844    Pharmacy to Dose Heparin   Not Applicable Continuous PRN Delvis Pace DO        prochlorperazine (COMPAZINE) injection 10 mg  10 mg Intravenous Q6H PRN Delvis Pace DO        [COMPLETED] sodium chloride 0.9 % bolus 2,000 mL  2,000 mL Intravenous Once Delvis Pace DO 2,000 mL/hr at 07/25/25 1716 2,000 mL at 07/25/25 1716    sodium chloride 0.9 % flush 10 mL  10 mL Intravenous Q12H Delvis Pace  DO Carmelo   10 mL at 07/26/25 0844    sodium chloride 0.9 % flush 10 mL  10 mL Intravenous PRN Delvis Paec DO        sodium chloride 0.9 % infusion 40 mL  40 mL Intravenous PRN Delvis Pace DO         Orders (all)        Start     Ordered    07/26/25 1300  Heparin Anti-Xa  Timed         07/26/25 0806    07/26/25 1000  digoxin (LANOXIN) injection 250 mcg  Once         07/26/25 0901    07/26/25 1000  lactated ringers bolus 1,000 mL  Once         07/26/25 0904    07/26/25 0901  metoprolol tartrate (LOPRESSOR) injection 5 mg  Every 6 Hours PRN         07/26/25 0901    07/26/25 0800  Oral Care  2 Times Daily       07/25/25 1838    07/26/25 0730  pantoprazole (PROTONIX) injection 40 mg  Every Morning Before Breakfast         07/25/25 1838    07/26/25 0700  Heparin Anti-Xa  Timed         07/26/25 0043    07/26/25 0600  ECG 12 Lead Drug Monitoring; Amiodarone  Daily       07/25/25 1228    07/26/25 0600  CBC & Differential  Daily       07/25/25 1838    07/26/25 0600  Comprehensive Metabolic Panel  Morning Draw         07/25/25 1838    07/26/25 0600  Protime-INR  Morning Draw         07/25/25 1838    07/26/25 0600  Magnesium  Morning Draw         07/25/25 1838    07/26/25 0600  CBC Auto Differential  PROCEDURE ONCE         07/25/25 2202    07/26/25 0402  Connectors / Hubs Must Be Scrubbed 15 Seconds Using 70% Alcohol Before Access - Allow to Dry Before Accessing Line  Continuous         07/26/25 0401    07/26/25 0130  heparin (porcine) 5000 UNIT/ML injection 2,000 Units  Once         07/26/25 0043    07/25/25 2300  Heparin Anti-Xa  Timed         07/25/25 1628    07/25/25 2200  Incentive Spirometry  Every 4 Hours While Awake       07/25/25 1838    07/25/25 2100  sodium chloride 0.9 % flush 10 mL  Every 12 Hours Scheduled         07/25/25 1838    07/25/25 2000  Vital Signs  Every 4 Hours       07/25/25 1838    07/25/25 1930  lactated ringers infusion  Continuous         07/25/25 1838 07/25/25  "1924  amiodarone 360 mg in 200 mL D5W infusion  Continuous        Placed in \"Followed by\" Linked Group    07/25/25 1228    07/25/25 1839  Intake & Output  Every Shift       07/25/25 1838    07/25/25 1839  Weigh Patient  Once         07/25/25 1838    07/25/25 1839  Insert Peripheral IV  Once         07/25/25 1838    07/25/25 1839  Saline Lock & Maintain IV Access  Continuous,   Status:  Canceled         07/25/25 1838    07/25/25 1839  Continuous Cardiac Monitoring  Continuous        Comments: Follow Standing Orders As Outlined in Process Instructions (Open Order Report to View Full Instructions)    07/25/25 1838 07/25/25 1839  Maintain IV Access  Continuous         07/25/25 1838 07/25/25 1839  Telemetry - Place Orders & Notify Provider of Results When Patient Experiences Acute Chest Pain, Dysrhythmia or Respiratory Distress  Continuous        Comments: Open Order Report to View Parameters Requiring Provider Notification    07/25/25 1838 07/25/25 1839  May Be Off Telemetry for Tests  Continuous         07/25/25 1838    07/25/25 1839  Continuous Pulse Oximetry  Continuous         07/25/25 1838 07/25/25 1839  Activity - Ad Daria  Until Discontinued         07/25/25 1838    07/25/25 1839  NPO Diet NPO Type: Ice Chips  Diet Effective Now         07/25/25 1838    07/25/25 1839  PT Consult: Eval & Treat Functional Mobility Below Baseline, Discharge Placement Assessment  Once         07/25/25 1838    07/25/25 1839  OT Consult: Eval & Treat ADL Performance Below Baseline, Discharge Placement Assessment  Once         07/25/25 1838    07/25/25 1839  Adult Transthoracic Echo Complete w/ Color, Spectral and Contrast if necessary per protocol  Once         07/25/25 1838    07/25/25 1839  Bowel Regimen Not Indicated  Once         07/25/25 1838    07/25/25 1838  sodium chloride 0.9 % flush 10 mL  As Needed         07/25/25 1838    07/25/25 1838  sodium chloride 0.9 % infusion 40 mL  As Needed         07/25/25 1838    " 07/25/25 1838  nitroglycerin (NITROSTAT) SL tablet 0.4 mg  Every 5 Minutes PRN         07/25/25 1838    07/25/25 1838  prochlorperazine (COMPAZINE) injection 10 mg  Every 6 Hours PRN         07/25/25 1838    07/25/25 1755  Chemical Cardioversion  Once        Comments: This order was created via procedure documentation    07/25/25 1754    07/25/25 1743  Telemetry Scan  Once         07/25/25 1743    07/25/25 1644  heparin 09751 units/250 mL (100 units/mL) in 0.45 % NaCl infusion  Titrated         07/25/25 1628    07/25/25 1619  sodium chloride 0.9 % bolus 2,000 mL  Once         07/25/25 1603    07/25/25 1619  heparin 15657 units/250 mL (100 units/mL) in 0.45 % NaCl infusion  Titrated,   Status:  Discontinued         07/25/25 1603    07/25/25 1614  Code Status and Medical Interventions: CPR (Attempt to Resuscitate); Full Support  Continuous         07/25/25 1613    07/25/25 1611  Inpatient Admission  Once         07/25/25 1612    07/25/25 1605  Lipid Panel  Once         07/25/25 1604    07/25/25 1605  Hemoglobin A1c  Once         07/25/25 1604    07/25/25 1604  CK  STAT         07/25/25 1604    07/25/25 1603  Initiate & Follow Heparin Protocol  Continuous         07/25/25 1603    07/25/25 1603  Notify Provider Platelet Count Less Than 61476  Continuous        Comments: Open Order Report to View Parameters Requiring Provider Notification    07/25/25 1603    07/25/25 1603  Stop Infusion & Notify Provider if Bleeding Occurs  Continuous        Comments: Open Order Report to View Parameters Requiring Provider Notification    07/25/25 1603    07/25/25 1603  Heparin Anti-Xa  STAT        Comments: Prior to Initial Heparin Bolus      07/25/25 1603    07/25/25 1602  heparin (porcine) 5000 UNIT/ML injection 4,000 Units  As Needed,   Status:  Discontinued         07/25/25 1603    07/25/25 1602  heparin (porcine) 5000 UNIT/ML injection 2,000 Units  As Needed         07/25/25 1603    07/25/25 1602  Pharmacy to Dose Heparin   "Continuous PRN         07/25/25 1603    07/25/25 1601  ondansetron (ZOFRAN) injection 4 mg  Every 6 Hours PRN         07/25/25 1603    07/25/25 1601  morphine injection 2 mg  Every 3 Hours PRN         07/25/25 1603    07/25/25 1408  High Sensitivity Troponin T 1Hr  PROCEDURE ONCE         07/25/25 1306    07/25/25 1356  iopamidol (ISOVUE-300) 61 % injection 85 mL  Once in Imaging         07/25/25 1340    07/25/25 1244  amiodarone 360 mg in 200 mL D5W infusion  Continuous        Placed in \"Followed by\" Linked Group    07/25/25 1228    07/25/25 1243  dilTIAZem (CARDIZEM) injection 10 mg  Once         07/25/25 1227    07/25/25 1243  amiodarone 150 mg in 100 mL D5W (loading dose)  Once        Placed in \"Followed by\" Linked Group    07/25/25 1228    07/25/25 1242  dilTIAZem (CARDIZEM) injection 15 mg  Once,   Status:  Discontinued         07/25/25 1226    07/25/25 1235  XR Chest 1 View  1 Time Imaging         07/25/25 1234    07/25/25 1235  CT Abdomen Pelvis With Contrast  1 Time Imaging        Comments: IV CONTRAST ONLY      07/25/25 1234    07/25/25 1233  CBC & Differential  Once         07/25/25 1232    07/25/25 1233  Comprehensive Metabolic Panel  Once         07/25/25 1232    07/25/25 1233  Protime-INR  Once         07/25/25 1232    07/25/25 1233  aPTT  Once         07/25/25 1232    07/25/25 1233  Urinalysis With Microscopic If Indicated (No Culture) - Urine, Clean Catch  Once         07/25/25 1232    07/25/25 1233  High Sensitivity Troponin T  Once         07/25/25 1232    07/25/25 1233  Lactic Acid, Plasma  Once         07/25/25 1232    07/25/25 1233  TSH Rfx On Abnormal To Free T4  Once         07/25/25 1232    07/25/25 1233  Magnesium  Once         07/25/25 1232    07/25/25 1233  CBC Auto Differential  PROCEDURE ONCE         07/25/25 1232    07/25/25 1228  Initiate & Follow Amiodarone Protocol  Continuous         07/25/25 1228    07/25/25 1228  Monitor QTc  Every Shift       07/25/25 1228    07/25/25 1228  Notify " Provider - QTc 500 milliseconds or Greater  Continuous        Comments: Open Order Report to View Parameters Requiring Provider Notification    07/25/25 1228    07/25/25 1224  ECG 12 Lead Rhythm Change  Once         07/25/25 1246    07/25/25 1222  dilTIAZem (CARDIZEM) 1mg/mL infusion (add-vantage) solution  - ADS Override Pull        Note to Pharmacy: Created by cabinet override    07/25/25 1222    07/25/25 1220  adenosine (ADENOCARD) injection 12 mg  Once         07/25/25 1239    07/25/25 1210  adenosine (ADENOCARD) 6 MG/2ML injection  - ADS Override Pull        Note to Pharmacy: Created by cabinet override    07/25/25 1210    07/25/25 1158  ECG 12 Lead Tachycardia  STAT         07/25/25 1158    Unscheduled  Change Dressing to IV Site As Needed When Damp, Loose or Soiled  As Needed       07/26/25 0401    Unscheduled  Change Needleless Connectors  As Needed      Comments: Change Needleless Connectors When:  - Administration Set Changed  - Dressing Changed  - Removed For Any Reason  - Residual Blood or Debris Within Connector  - Prior to Drawing Blood Cultures  - Contamination of Connector  - After Administration of Blood or Blood Components    07/26/25 0401    --  dilTIAZem CD (CARDIZEM CD) 180 MG 24 hr capsule  Nightly         07/25/25 2226    --  losartan (COZAAR) 100 MG tablet  Daily         07/25/25 2226    --  tadalafil (CIALIS) 5 MG tablet  Nightly         07/25/25 2226    --  ondansetron (ZOFRAN) 4 MG tablet  Every 8 Hours PRN         07/25/25 2226    Pending  dilTIAZem CD (CARDIZEM CD) 24 hr capsule 180 mg  Nightly         Pending    Pending  losartan (COZAAR) tablet 100 mg  Daily         Pending                  Physician Progress Notes (all)    No notes of this type exist for this encounter.       Consult Notes (all)    No notes of this type exist for this encounter.

## 2025-07-26 NOTE — PLAN OF CARE
Problem: Adult Inpatient Plan of Care  Goal: Plan of Care Review  Outcome: Progressing     Patient resting on RA, VSS, A&O x4.

## 2025-07-26 NOTE — PROGRESS NOTES
HEPARIN INFUSION  Crow Zeng is a  79 y.o. male receiving heparin infusion.     Therapy for (VTE/Cardiac):   cardiac  Patient Dosing Weight:   86.2  kg  Initial Bolus (Y/N):     no  Any Bolus (Y/N):       yes    Signs or Symptoms of Bleeding:     no    Cardiac or Other (Not VTE)   Initial Bolus: 60 units/kg (Max 4,000 units)  Initial rate: 12 units/kg/hr (Max 1,000 units/hr)   Anti Xa Rebolus Infusion Hold time Change infusion Dose (Units/kg/hr) Next Anti Xa or aPTT Level Due   < 0.11 50 Units/kg  (4000 Units Max) None Increase by  3 Units/kg/hr 6 hours   0.11- 0.19 25 Units/kg  (2000 Units Max) None Increase by  2 Units/kg/hr 6 hours   0.2 - 0.29 0 None Increase by  1 Units/kg/hr 6 hours   0.3 - 0.5 0 None No Change 6 hours (after 2 consecutive levels in range check q24h @0700)   0.51 - 0.6 0 None Decrease by  1 Units/kg/hr 6 hours   0.61 - 0.8 0 30 Minutes Decrease by  2 Units/kg/hr 6 hours   0.81 - 1 0 60 Minutes Decrease by  3 Units/kg/hr 6 hours   >1 0 Hold  After Anti Xa less than 0.5 decrease previous rate by  4 Units/kg/hr  Every 2 hours until Anti Xa  less than 0.5 then when infusion restarts in 6 hours         Recommend anti-Xa every 6 hours.          Date   Time   Anti-Xa Current Rate (Unit/kg/hr) Bolus   (Units) Rate Change   (Unit/kg/hr) New Rate (Unit/kg/hr) Next   Anti-Xa Comments  Pump Check Daily   7/25 1640 pending 0 no 11.60 11.60 2300 No initial bolus, rate adjusted, no s/s bleeding, d/w   Shan ROCHE   07/26 0040 0.18 11.60 2000 +2 13.60 0700 Increase rate plus bolus, no s/s bleeding per KALEY Walker    7/26 0805 0.43 13.6 - 0 13.6 1300 Therapeutic x 1. Spoke with KALEY Pacheco. No s/s bleeding.                                                                                                                                                                                                             Pharmacy will continue to follow anti-Xa results and monitor for signs and symptoms of bleeding or  thrombosis.    Thank you.  Celia Horta, Pharm.D.  7/26/2025  08:08 EDT

## 2025-07-27 LAB
ALBUMIN SERPL-MCNC: 3.2 G/DL (ref 3.5–5.2)
ALBUMIN/GLOB SERPL: 1.4 G/DL
ALP SERPL-CCNC: 71 U/L (ref 39–117)
ALT SERPL W P-5'-P-CCNC: 8 U/L (ref 1–41)
ANION GAP SERPL CALCULATED.3IONS-SCNC: 10.8 MMOL/L (ref 5–15)
AST SERPL-CCNC: 22 U/L (ref 1–40)
BASOPHILS # BLD AUTO: 0.06 10*3/MM3 (ref 0–0.2)
BASOPHILS NFR BLD AUTO: 0.7 % (ref 0–1.5)
BILIRUB SERPL-MCNC: 0.3 MG/DL (ref 0–1.2)
BUN SERPL-MCNC: 11.5 MG/DL (ref 8–23)
BUN/CREAT SERPL: 11.5 (ref 7–25)
CALCIUM SPEC-SCNC: 7.9 MG/DL (ref 8.6–10.5)
CHLORIDE SERPL-SCNC: 106 MMOL/L (ref 98–107)
CO2 SERPL-SCNC: 21.2 MMOL/L (ref 22–29)
CREAT SERPL-MCNC: 1 MG/DL (ref 0.76–1.27)
DEPRECATED RDW RBC AUTO: 47.4 FL (ref 37–54)
EGFRCR SERPLBLD CKD-EPI 2021: 76.1 ML/MIN/1.73
EOSINOPHIL # BLD AUTO: 0.28 10*3/MM3 (ref 0–0.4)
EOSINOPHIL NFR BLD AUTO: 3.2 % (ref 0.3–6.2)
ERYTHROCYTE [DISTWIDTH] IN BLOOD BY AUTOMATED COUNT: 14.2 % (ref 12.3–15.4)
GLOBULIN UR ELPH-MCNC: 2.3 GM/DL
GLUCOSE SERPL-MCNC: 89 MG/DL (ref 65–99)
HCT VFR BLD AUTO: 40 % (ref 37.5–51)
HGB BLD-MCNC: 13.4 G/DL (ref 13–17.7)
IMM GRANULOCYTES # BLD AUTO: 0.06 10*3/MM3 (ref 0–0.05)
IMM GRANULOCYTES NFR BLD AUTO: 0.7 % (ref 0–0.5)
INR PPP: 1.11 (ref 0.9–1.1)
LYMPHOCYTES # BLD AUTO: 1.5 10*3/MM3 (ref 0.7–3.1)
LYMPHOCYTES NFR BLD AUTO: 17 % (ref 19.6–45.3)
MCH RBC QN AUTO: 30.3 PG (ref 26.6–33)
MCHC RBC AUTO-ENTMCNC: 33.5 G/DL (ref 31.5–35.7)
MCV RBC AUTO: 90.5 FL (ref 79–97)
MONOCYTES # BLD AUTO: 0.88 10*3/MM3 (ref 0.1–0.9)
MONOCYTES NFR BLD AUTO: 10 % (ref 5–12)
NEUTROPHILS NFR BLD AUTO: 6.06 10*3/MM3 (ref 1.7–7)
NEUTROPHILS NFR BLD AUTO: 68.4 % (ref 42.7–76)
NRBC BLD AUTO-RTO: 0 /100 WBC (ref 0–0.2)
PLATELET # BLD AUTO: 224 10*3/MM3 (ref 140–450)
PMV BLD AUTO: 10.3 FL (ref 6–12)
POTASSIUM SERPL-SCNC: 3.7 MMOL/L (ref 3.5–5.2)
PROT SERPL-MCNC: 5.5 G/DL (ref 6–8.5)
PROTHROMBIN TIME: 15 SECONDS (ref 12.5–15.2)
QT INTERVAL: 292 MS
QT INTERVAL: 388 MS
QTC INTERVAL: 430 MS
QTC INTERVAL: 459 MS
RBC # BLD AUTO: 4.42 10*6/MM3 (ref 4.14–5.8)
SODIUM SERPL-SCNC: 138 MMOL/L (ref 136–145)
UFH PPP CHRO-ACNC: 0.26 IU/ML (ref 0.3–0.7)
UFH PPP CHRO-ACNC: 0.4 IU/ML (ref 0.3–0.7)
UFH PPP CHRO-ACNC: 0.69 IU/ML (ref 0.3–0.7)
UFH PPP CHRO-ACNC: 1.04 IU/ML (ref 0.3–0.7)
WBC NRBC COR # BLD AUTO: 8.84 10*3/MM3 (ref 3.4–10.8)

## 2025-07-27 PROCEDURE — 25010000002 LABETALOL 5 MG/ML SOLUTION: Performed by: STUDENT IN AN ORGANIZED HEALTH CARE EDUCATION/TRAINING PROGRAM

## 2025-07-27 PROCEDURE — 93005 ELECTROCARDIOGRAM TRACING: CPT | Performed by: STUDENT IN AN ORGANIZED HEALTH CARE EDUCATION/TRAINING PROGRAM

## 2025-07-27 PROCEDURE — 25810000003 LACTATED RINGERS PER 1000 ML: Performed by: STUDENT IN AN ORGANIZED HEALTH CARE EDUCATION/TRAINING PROGRAM

## 2025-07-27 PROCEDURE — 93010 ELECTROCARDIOGRAM REPORT: CPT | Performed by: SPECIALIST

## 2025-07-27 PROCEDURE — 93005 ELECTROCARDIOGRAM TRACING: CPT

## 2025-07-27 PROCEDURE — 85520 HEPARIN ASSAY: CPT

## 2025-07-27 PROCEDURE — 25010000002 HEPARIN (PORCINE) 25000-0.45 UT/250ML-% SOLUTION

## 2025-07-27 PROCEDURE — 80053 COMPREHEN METABOLIC PANEL: CPT | Performed by: STUDENT IN AN ORGANIZED HEALTH CARE EDUCATION/TRAINING PROGRAM

## 2025-07-27 PROCEDURE — 85025 COMPLETE CBC W/AUTO DIFF WBC: CPT | Performed by: STUDENT IN AN ORGANIZED HEALTH CARE EDUCATION/TRAINING PROGRAM

## 2025-07-27 PROCEDURE — 25010000002 METOPROLOL TARTRATE 5 MG/5ML SOLUTION: Performed by: STUDENT IN AN ORGANIZED HEALTH CARE EDUCATION/TRAINING PROGRAM

## 2025-07-27 PROCEDURE — 25010000002 DIGOXIN PER 500 MCG: Performed by: STUDENT IN AN ORGANIZED HEALTH CARE EDUCATION/TRAINING PROGRAM

## 2025-07-27 PROCEDURE — 25010000002 AMIODARONE IN DEXTROSE 5% 360-4.14 MG/200ML-% SOLUTION: Performed by: STUDENT IN AN ORGANIZED HEALTH CARE EDUCATION/TRAINING PROGRAM

## 2025-07-27 PROCEDURE — 99233 SBSQ HOSP IP/OBS HIGH 50: CPT | Performed by: STUDENT IN AN ORGANIZED HEALTH CARE EDUCATION/TRAINING PROGRAM

## 2025-07-27 PROCEDURE — 85610 PROTHROMBIN TIME: CPT | Performed by: STUDENT IN AN ORGANIZED HEALTH CARE EDUCATION/TRAINING PROGRAM

## 2025-07-27 RX ORDER — DILTIAZEM HYDROCHLORIDE 120 MG/1
240 CAPSULE, COATED, EXTENDED RELEASE ORAL
Status: DISCONTINUED | OUTPATIENT
Start: 2025-07-27 | End: 2025-07-31 | Stop reason: HOSPADM

## 2025-07-27 RX ORDER — METOPROLOL TARTRATE 25 MG/1
25 TABLET, FILM COATED ORAL EVERY 12 HOURS SCHEDULED
Status: DISCONTINUED | OUTPATIENT
Start: 2025-07-27 | End: 2025-07-31 | Stop reason: HOSPADM

## 2025-07-27 RX ORDER — FINASTERIDE 5 MG/1
5 TABLET, FILM COATED ORAL DAILY
Status: DISCONTINUED | OUTPATIENT
Start: 2025-07-27 | End: 2025-07-31 | Stop reason: HOSPADM

## 2025-07-27 RX ORDER — TADALAFIL 5 MG/1
5 TABLET ORAL NIGHTLY
Status: DISCONTINUED | OUTPATIENT
Start: 2025-07-27 | End: 2025-07-31 | Stop reason: HOSPADM

## 2025-07-27 RX ORDER — DIGOXIN 0.25 MG/ML
250 INJECTION INTRAMUSCULAR; INTRAVENOUS ONCE
Status: COMPLETED | OUTPATIENT
Start: 2025-07-27 | End: 2025-07-27

## 2025-07-27 RX ADMIN — METOPROLOL TARTRATE 5 MG: 1 INJECTION, SOLUTION INTRAVENOUS at 07:45

## 2025-07-27 RX ADMIN — DIGOXIN 250 MCG: 0.25 INJECTION INTRAMUSCULAR; INTRAVENOUS at 10:00

## 2025-07-27 RX ADMIN — PANTOPRAZOLE SODIUM 40 MG: 40 INJECTION, POWDER, FOR SOLUTION INTRAVENOUS at 16:35

## 2025-07-27 RX ADMIN — TADALAFIL 5 MG: 5 TABLET ORAL at 21:13

## 2025-07-27 RX ADMIN — METOPROLOL TARTRATE 25 MG: 25 TABLET, FILM COATED ORAL at 20:19

## 2025-07-27 RX ADMIN — FINASTERIDE 5 MG: 5 TABLET, FILM COATED ORAL at 10:46

## 2025-07-27 RX ADMIN — HEPARIN SODIUM 9.6 UNITS/KG/HR: 10000 INJECTION, SOLUTION INTRAVENOUS at 21:54

## 2025-07-27 RX ADMIN — PANTOPRAZOLE SODIUM 40 MG: 40 INJECTION, POWDER, FOR SOLUTION INTRAVENOUS at 07:45

## 2025-07-27 RX ADMIN — SODIUM CHLORIDE, POTASSIUM CHLORIDE, SODIUM LACTATE AND CALCIUM CHLORIDE 75 ML/HR: 600; 310; 30; 20 INJECTION, SOLUTION INTRAVENOUS at 12:54

## 2025-07-27 RX ADMIN — AMIODARONE HYDROCHLORIDE 0.5 MG/MIN: 1.8 INJECTION, SOLUTION INTRAVENOUS at 07:11

## 2025-07-27 RX ADMIN — LABETALOL HYDROCHLORIDE 10 MG: 5 INJECTION, SOLUTION INTRAVENOUS at 16:35

## 2025-07-27 RX ADMIN — Medication 10 ML: at 07:46

## 2025-07-27 RX ADMIN — DILTIAZEM HYDROCHLORIDE 240 MG: 120 CAPSULE, EXTENDED RELEASE ORAL at 10:45

## 2025-07-27 RX ADMIN — METOPROLOL TARTRATE 25 MG: 25 TABLET, FILM COATED ORAL at 10:45

## 2025-07-27 RX ADMIN — Medication 10 ML: at 20:20

## 2025-07-27 NOTE — PLAN OF CARE
Problem: Adult Inpatient Plan of Care  Goal: Plan of Care Review  Outcome: Progressing  Flowsheets (Taken 7/27/2025 0405)  Progress: no change  Plan of Care Reviewed With: patient  Goal: Patient-Specific Goal (Individualized)  Outcome: Progressing  Goal: Absence of Hospital-Acquired Illness or Injury  Outcome: Progressing  Intervention: Identify and Manage Fall Risk  Recent Flowsheet Documentation  Taken 7/27/2025 0300 by Sylvia Pope RN  Safety Promotion/Fall Prevention:   safety round/check completed   room organization consistent   nonskid shoes/slippers when out of bed   fall prevention program maintained   clutter free environment maintained   assistive device/personal items within reach   activity supervised  Taken 7/27/2025 0100 by Sylvia Pope RN  Safety Promotion/Fall Prevention:   safety round/check completed   room organization consistent   nonskid shoes/slippers when out of bed   fall prevention program maintained   clutter free environment maintained   assistive device/personal items within reach   activity supervised  Taken 7/26/2025 2300 by Sylvia Pope RN  Safety Promotion/Fall Prevention:   safety round/check completed   room organization consistent   nonskid shoes/slippers when out of bed   fall prevention program maintained   clutter free environment maintained   assistive device/personal items within reach   activity supervised  Taken 7/26/2025 2100 by Sylvia Pope RN  Safety Promotion/Fall Prevention:   safety round/check completed   room organization consistent   nonskid shoes/slippers when out of bed   fall prevention program maintained   clutter free environment maintained   assistive device/personal items within reach   activity supervised  Taken 7/26/2025 1900 by Sylvia Pope RN  Safety Promotion/Fall Prevention:   safety round/check completed   room organization consistent   nonskid shoes/slippers when out of bed   fall prevention program maintained   clutter free  environment maintained   assistive device/personal items within reach   activity supervised  Intervention: Prevent Skin Injury  Recent Flowsheet Documentation  Taken 7/27/2025 0200 by Sylvia Pope RN  Skin Protection:   transparent dressing maintained   incontinence pads utilized  Taken 7/26/2025 2000 by Sylvia Pope RN  Skin Protection:   transparent dressing maintained   incontinence pads utilized  Intervention: Prevent and Manage VTE (Venous Thromboembolism) Risk  Recent Flowsheet Documentation  Taken 7/26/2025 2000 by Sylvia Pope RN  VTE Prevention/Management: (heparin gtt) other (see comments)  Intervention: Prevent Infection  Recent Flowsheet Documentation  Taken 7/27/2025 0300 by Sylvia Pope RN  Infection Prevention:   rest/sleep promoted   single patient room provided  Taken 7/27/2025 0100 by Sylvia Pope RN  Infection Prevention:   rest/sleep promoted   single patient room provided  Taken 7/26/2025 2300 by Sylvia Pope RN  Infection Prevention:   rest/sleep promoted   single patient room provided  Taken 7/26/2025 2100 by Sylvia Pope RN  Infection Prevention:   rest/sleep promoted   single patient room provided  Taken 7/26/2025 1900 by Sylvia Pope RN  Infection Prevention:   rest/sleep promoted   single patient room provided  Goal: Optimal Comfort and Wellbeing  Outcome: Progressing  Intervention: Provide Person-Centered Care  Recent Flowsheet Documentation  Taken 7/27/2025 0200 by Sylvia Pope RN  Trust Relationship/Rapport:   care explained   choices provided   emotional support provided   questions answered   empathic listening provided   questions encouraged   thoughts/feelings acknowledged   reassurance provided  Taken 7/26/2025 2000 by Sylvia Pope RN  Trust Relationship/Rapport:   care explained   emotional support provided   empathic listening provided   choices provided   questions answered   questions encouraged   thoughts/feelings acknowledged    reassurance provided  Goal: Readiness for Transition of Care  Outcome: Progressing     Problem: Fall Injury Risk  Goal: Absence of Fall and Fall-Related Injury  Outcome: Progressing  Intervention: Identify and Manage Contributors  Recent Flowsheet Documentation  Taken 7/27/2025 0300 by Sylvia Pope RN  Medication Review/Management: medications reviewed  Taken 7/27/2025 0200 by Sylvia Pope RN  Self-Care Promotion: independence encouraged  Taken 7/27/2025 0100 by Sylvia Pope RN  Medication Review/Management: medications reviewed  Taken 7/26/2025 2300 by Sylvia Pope RN  Medication Review/Management: medications reviewed  Taken 7/26/2025 2100 by Sylvia Pope RN  Medication Review/Management: medications reviewed  Taken 7/26/2025 2000 by Sylvia Pope RN  Self-Care Promotion: independence encouraged  Taken 7/26/2025 1900 by Sylvia Pope RN  Medication Review/Management: medications reviewed  Intervention: Promote Injury-Free Environment  Recent Flowsheet Documentation  Taken 7/27/2025 0300 by Sylvia Pope RN  Safety Promotion/Fall Prevention:   safety round/check completed   room organization consistent   nonskid shoes/slippers when out of bed   fall prevention program maintained   clutter free environment maintained   assistive device/personal items within reach   activity supervised  Taken 7/27/2025 0100 by Sylvia Pope RN  Safety Promotion/Fall Prevention:   safety round/check completed   room organization consistent   nonskid shoes/slippers when out of bed   fall prevention program maintained   clutter free environment maintained   assistive device/personal items within reach   activity supervised  Taken 7/26/2025 2300 by Sylvia Pope RN  Safety Promotion/Fall Prevention:   safety round/check completed   room organization consistent   nonskid shoes/slippers when out of bed   fall prevention program maintained   clutter free environment maintained   assistive device/personal  items within reach   activity supervised  Taken 7/26/2025 2100 by Sylvia Pope, RN  Safety Promotion/Fall Prevention:   safety round/check completed   room organization consistent   nonskid shoes/slippers when out of bed   fall prevention program maintained   clutter free environment maintained   assistive device/personal items within reach   activity supervised  Taken 7/26/2025 1900 by Sylvia Pope, RN  Safety Promotion/Fall Prevention:   safety round/check completed   room organization consistent   nonskid shoes/slippers when out of bed   fall prevention program maintained   clutter free environment maintained   assistive device/personal items within reach   activity supervised   Goal Outcome Evaluation:  Plan of Care Reviewed With: patient        Progress: no change

## 2025-07-27 NOTE — PROGRESS NOTES
[facility] HOSPITALIST PROGRESS NOTE     Patient Identification:  Name:  Crow Zeng  Age:  79 y.o.  Sex:  male  :  1945  MRN:  [id number]  Visit Number:  [id number]  ROOM: [place] [address]     Primary Care Provider:  Ruby Choudhary APRN    Length of stay in inpatient status:  2    Subjective     Chief Compliant:    Chief Complaint   Patient presents with    Constipation    Rapid Heart Rate       History of Presenting Illness:    Patient seen in follow-up for new onset A-fib with RVR and small bowel obstruction.  This morning he says he feels good, has passed a significant amount of gas but no bowel movement.  He tolerated clear liquids over the past 24 hours without any nausea or vomiting or reflux.  He would like to try to advance the diet.  No chest pain/pressure tightness or other symptoms.  I encouraged him to ambulate as tolerated.    Objective     Current Hospital Meds:dilTIAZem CD, 240 mg, Oral, Q24H  finasteride, 5 mg, Oral, Daily  metoprolol tartrate, 25 mg, Oral, Q12H  pantoprazole, 40 mg, Intravenous, BID AC  sodium chloride, 10 mL, Intravenous, Q12H    amiodarone, 0.5 mg/min, Last Rate: 0.5 mg/min (25 0711)  [Held by provider] heparin, 13.6 Units/kg/hr (Dosing Weight), Last Rate: Stopped (25 0719)  lactated ringers, 75 mL/hr, Last Rate: 75 mL/hr (25 2324)  Pharmacy Consult,   Pharmacy to Dose Heparin,         Current Antimicrobial Therapy:  Anti-Infectives (From admission, onward)      None          Current Diuretic Therapy:  Diuretics (From admission, onward)      None          ----------------------------------------------------------------------------------------------------------------------  Vital Signs:  Temp:  [97.3 °F (36.3 °C)-99.3 °F (37.4 °C)] 99.3 °F (37.4 °C)  Heart Rate:  [] 93  Resp:  [8-26] 14  BP: (102-184)/() 164/100  SpO2:  [80 %-100 %] 93 %  on   ;   Device (Oxygen Therapy): room air  Body mass index is 29.37 kg/m².    Wt Readings  from Last 3 Encounters:   07/27/25 90.2 kg (198 lb 13.7 oz)   04/09/25 90.4 kg (199 lb 6.4 oz)   10/09/24 90.3 kg (199 lb)     Intake & Output (last 3 days)         07/24 0701 07/25 0700 07/25 0701 07/26 0700 07/26 0701 07/27 0700 07/27 0701 07/28 0700    P.O.  0 0 120    I.V. (mL/kg)  100 (1.2) 1196 (13.9)     Total Intake(mL/kg)  100 (1.2) 1196 (13.9) 120 (1.4)    Urine (mL/kg/hr)  800 1300 (0.6)     Total Output  800 1300     Net  -700 -104 +120                  Diet: Gastrointestinal; Low Irritant; Fluid Consistency: Thin (IDDSI 0)  ----------------------------------------------------------------------------------------------------------------------  Physical exam:  Constitutional: Elderly male, nontoxic, speaking clearly in full sentences, Well-developed and well-nourished, resting comfortably in bed, no acute distress.  HENT:  Head:  Normocephalic and atraumatic.  Mouth:  Moist mucous membranes.  Eyes:  Conjunctivae and EOM are normal. No scleral icterus.   Cardiovascular: Irregularly irregular and normal heart sounds with no murmur. No JVD.   Pulmonary/Chest:  No respiratory distress, no wheezes, no crackles, with normal breath sounds and good air movement. Unlabored. No accessory muscle use.  Abdominal: Distended, soft, bowel sounds present, nontender  Musculoskeletal:  No tenderness, and no deformity.  No red or swollen joints anywhere.    Neurological:  Alert and oriented to person, place, and time.  No cranial nerve deficit.   Nonfocal.   Skin:  Skin is warm and dry. No rash noted. No pallor.   Peripheral vascular:  No clubbing, no cyanosis, no edema. Pedal and tibial pulses 2 out of 4 bilaterally.     ----------------------------------------------------------------------------------------------------------------------  Results from last 7 days   Lab Units 07/27/25  0630 07/25/25  2315 07/25/25  1235   LACTATE mmol/L  --   --  1.4   WBC 10*3/mm3 8.84 9.13 9.64   HEMOGLOBIN g/dL 13.4 13.4 14.5  "  HEMATOCRIT % 40.0 43.3 44.8   MCV fL 90.5 97.7* 94.1   MCHC g/dL 33.5 30.9* 32.4   PLATELETS 10*3/mm3 224 224 297   INR  1.11* 1.12* 1.05         Results from last 7 days   Lab Units 07/27/25  0630 07/25/25  2315 07/25/25  1235   SODIUM mmol/L 138 137 136   POTASSIUM mmol/L 3.7 4.2 4.3   MAGNESIUM mg/dL  --  2.1 2.1   CHLORIDE mmol/L 106 108* 102   CO2 mmol/L 21.2* 17.2* 20.2*   BUN mg/dL 11.5 20.2 25.7*   CREATININE mg/dL 1.00 1.09 1.48*   CALCIUM mg/dL 7.9* 7.7* 8.9   GLUCOSE mg/dL 89 92 112*   ALBUMIN g/dL 3.2* 3.2* 4.2   BILIRUBIN mg/dL 0.3 0.3 0.9   ALK PHOS U/L 71 68 87   AST (SGOT) U/L 22 18 20   ALT (SGPT) U/L 8 8 10   Estimated Creatinine Clearance: 65.4 mL/min (by C-G formula based on SCr of 1 mg/dL).  No results found for: \"AMMONIA\"  Results from last 7 days   Lab Units 07/25/25  1421 07/25/25  1235   CK TOTAL U/L 134  --    HSTROP T ng/L 17 22*         Results from last 7 days   Lab Units 07/25/25  1421   CHOLESTEROL mg/dL 125   TRIGLYCERIDES mg/dL 102   HDL CHOL mg/dL 38*   LDL CHOL mg/dL 68     Hemoglobin A1C   Date/Time Value Ref Range Status   07/25/2025 1235 6.06 (H) 4.80 - 5.60 % Final     Lab Results   Component Value Date    TSH 1.330 07/25/2025     No results found for: \"PREGTESTUR\", \"PREGSERUM\", \"HCG\", \"HCGQUANT\"  Pain Management Panel           No data to display              Brief Urine Lab Results  (Last result in the past 365 days)        Color   Clarity   Blood   Leuk Est   Nitrite   Protein   CREAT   Urine HCG        07/25/25 1422 Yellow   Clear   Negative   Negative   Negative   Negative                 No results found for: \"BLOODCX\"  No results found for: \"URINECX\"  No results found for: \"WOUNDCX\"  No results found for: \"STOOLCX\"  No results found for: \"RESPCX\"  No results found for: \"AFBCX\"  Results from last 7 days   Lab Units 07/25/25  1235   LACTATE mmol/L 1.4       I have personally looked at the labs and they are summarized " above.  ----------------------------------------------------------------------------------------------------------------------  Detailed radiology reports for the last 24 hours:  Imaging Results (Last 24 Hours)       ** No results found for the last 24 hours. **          Assessment & Plan      Patient is an 79-year-old male with history significant for prostate cancer, BPH and essential hypertension who presented to the ER complaining of abdominal distention and pain.    #New onset atrial flutter with RVR  #Essential hypertension  --Patient denies any chest pain/pressure tightness or palpitations leading up to presentation or during initial presentation in the ER in which she was noted to be in new onset atrial flutter.  Initially hypotensive with heart rates in the 150s to 160s  --Initial troponin 22, repeat 17  --EKG with atrial flutter with RVR without ischemic changes  --A1c/TSH/lipid panel reviewed  --Echo with an EF of 51 to 55%, no significant valvular abnormalities  --Additional dose of IV dig x 1  --Since he has tolerated clear liquids, will increase his home dose of Cardizem to 240 mg and started on metoprolol tartrate twice daily  --Continue heparin GTT for now until SBO completely resolves, then will transition to p.o. Eliquis  --Continue amiodarone for for now, plan to let this  and will adjust p.o. meds moving forward  --Continue to monitor on PCU    #Partial small bowel obstruction  --Patient had been constipated several days leading up to presentation, multiple episodes of nausea/vomiting which had subsided prior to being evaluated in the ER.  Only significant surgical history was remote appendectomy.  --Unremarkable colonoscopy 5 years ago  --Admit labs unremarkable  --CT abdomen pelvis with contrast noted dilated distended small bowel with transition point somewhere in the right mid quadrant  --Avoid NG tube unless he becomes nauseous with emesis as he had no significant gastric content on  the CT  --Antiemetics, opiates for symptoms if needed, PPI IV twice daily  --Has tolerated clear liquids for the past 24 hours, continues to have good flatulence but no bowel movement.  No nausea or vomiting after starting on clears therefore will advance to GI low irritant--> I did discuss with him if he has any type of abdominal pain, nausea or vomiting or reflux to stop    #Acute kidney injury due to prerenal azotemia, resolved with IVF  #Prostate cancer  #BPH, required Guzmán but DC'd, add finasteride, urology gives him Cialis  #Simple renal cyst    Copied portions of this report have been reviewed and are accurate as of 07/27/25     CHECKLIST:  Abx: None  VTE: Heparin GTT  GI ppx: PPI  Diet: GI low irritant  Code: CPR, full  Dispo: Patient is high risk due to new onset atrial flutter with RVR, partial SBO requiring PCU level of care.  Anticipate 24 to 48 hours pending clinical course.  Dispo Home when medically stable.    Delvis Pace DO  [facility]  07/27/25  11:30 EDT

## 2025-07-27 NOTE — PLAN OF CARE
Problem: Adult Inpatient Plan of Care  Goal: Plan of Care Review  Outcome: Progressing  Goal: Patient-Specific Goal (Individualized)  Outcome: Progressing  Goal: Absence of Hospital-Acquired Illness or Injury  Outcome: Progressing  Intervention: Identify and Manage Fall Risk  Recent Flowsheet Documentation  Taken 7/27/2025 1500 by Tara Wallace RN  Safety Promotion/Fall Prevention:   activity supervised   assistive device/personal items within reach   clutter free environment maintained   fall prevention program maintained   lighting adjusted   nonskid shoes/slippers when out of bed   room organization consistent   safety round/check completed  Taken 7/27/2025 1300 by Tara Wallace RN  Safety Promotion/Fall Prevention:   activity supervised   assistive device/personal items within reach   clutter free environment maintained   fall prevention program maintained   lighting adjusted   nonskid shoes/slippers when out of bed   room organization consistent   safety round/check completed  Taken 7/27/2025 1100 by Tara Wallace RN  Safety Promotion/Fall Prevention:   nonskid shoes/slippers when out of bed   safety round/check completed  Taken 7/27/2025 0900 by Tara Wallace RN  Safety Promotion/Fall Prevention:   activity supervised   assistive device/personal items within reach   clutter free environment maintained   fall prevention program maintained   lighting adjusted   nonskid shoes/slippers when out of bed   room organization consistent   safety round/check completed  Taken 7/27/2025 0700 by Tara Wallace, RN  Safety Promotion/Fall Prevention:   activity supervised   assistive device/personal items within reach   clutter free environment maintained   fall prevention program maintained   lighting adjusted   nonskid shoes/slippers when out of bed   room organization consistent   safety round/check completed  Intervention: Prevent Skin Injury  Recent Flowsheet Documentation  Taken 7/27/2025 0745 by  Madison, Tara, RN  Skin Protection:   transparent dressing maintained   skin sealant/moisture barrier applied   silicone foam dressing in place  Intervention: Prevent and Manage VTE (Venous Thromboembolism) Risk  Recent Flowsheet Documentation  Taken 7/27/2025 0745 by Tara Wallace RN  VTE Prevention/Management: (heparin) other (see comments)  Intervention: Prevent Infection  Recent Flowsheet Documentation  Taken 7/27/2025 1300 by Tara Wallace RN  Infection Prevention:   single patient room provided   visitors restricted/screened   rest/sleep promoted  Taken 7/27/2025 1100 by Tara Wallace RN  Infection Prevention:   single patient room provided   visitors restricted/screened   rest/sleep promoted  Taken 7/27/2025 0900 by Tara Wallace RN  Infection Prevention:   single patient room provided   visitors restricted/screened   rest/sleep promoted  Taken 7/27/2025 0700 by Tara Wallace RN  Infection Prevention:   single patient room provided   visitors restricted/screened   rest/sleep promoted  Goal: Optimal Comfort and Wellbeing  Outcome: Progressing  Intervention: Provide Person-Centered Care  Recent Flowsheet Documentation  Taken 7/27/2025 0745 by Tara Wallace RN  Trust Relationship/Rapport:   care explained   choices provided   questions answered   questions encouraged  Goal: Readiness for Transition of Care  Outcome: Progressing     Problem: Fall Injury Risk  Goal: Absence of Fall and Fall-Related Injury  Outcome: Progressing  Intervention: Identify and Manage Contributors  Recent Flowsheet Documentation  Taken 7/27/2025 1500 by Tara Wallace RN  Medication Review/Management: medications reviewed  Taken 7/27/2025 1300 by Tara Wallace RN  Medication Review/Management: medications reviewed  Taken 7/27/2025 1100 by Tara Wallace RN  Medication Review/Management: medications reviewed  Taken 7/27/2025 0900 by Tara Wallace RN  Medication Review/Management: medications  reviewed  Taken 7/27/2025 0700 by Tara Wallace, RN  Medication Review/Management: medications reviewed  Intervention: Promote Injury-Free Environment  Recent Flowsheet Documentation  Taken 7/27/2025 1500 by Tara Wallace, RN  Safety Promotion/Fall Prevention:   activity supervised   assistive device/personal items within reach   clutter free environment maintained   fall prevention program maintained   lighting adjusted   nonskid shoes/slippers when out of bed   room organization consistent   safety round/check completed  Taken 7/27/2025 1300 by Tara Wallace, RN  Safety Promotion/Fall Prevention:   activity supervised   assistive device/personal items within reach   clutter free environment maintained   fall prevention program maintained   lighting adjusted   nonskid shoes/slippers when out of bed   room organization consistent   safety round/check completed  Taken 7/27/2025 1100 by Tara Wallace, RN  Safety Promotion/Fall Prevention:   nonskid shoes/slippers when out of bed   safety round/check completed  Taken 7/27/2025 0900 by Tara Wallace RN  Safety Promotion/Fall Prevention:   activity supervised   assistive device/personal items within reach   clutter free environment maintained   fall prevention program maintained   lighting adjusted   nonskid shoes/slippers when out of bed   room organization consistent   safety round/check completed  Taken 7/27/2025 0700 by Tara Wallace, RN  Safety Promotion/Fall Prevention:   activity supervised   assistive device/personal items within reach   clutter free environment maintained   fall prevention program maintained   lighting adjusted   nonskid shoes/slippers when out of bed   room organization consistent   safety round/check completed   Goal Outcome Evaluation:

## 2025-07-27 NOTE — PROGRESS NOTES
HEPARIN INFUSION  Crow Zeng is a  79 y.o. male receiving heparin infusion.     Therapy for (VTE/Cardiac):   cardiac  Patient Dosing Weight:   86.2  kg  Initial Bolus (Y/N):     no  Any Bolus (Y/N):       yes    Signs or Symptoms of Bleeding:     no    Cardiac or Other (Not VTE)   Initial Bolus: 60 units/kg (Max 4,000 units)  Initial rate: 12 units/kg/hr (Max 1,000 units/hr)   Anti Xa Rebolus Infusion Hold time Change infusion Dose (Units/kg/hr) Next Anti Xa or aPTT Level Due   < 0.11 50 Units/kg  (4000 Units Max) None Increase by  3 Units/kg/hr 6 hours   0.11- 0.19 25 Units/kg  (2000 Units Max) None Increase by  2 Units/kg/hr 6 hours   0.2 - 0.29 0 None Increase by  1 Units/kg/hr 6 hours   0.3 - 0.5 0 None No Change 6 hours (after 2 consecutive levels in range check q24h @0700)   0.51 - 0.6 0 None Decrease by  1 Units/kg/hr 6 hours   0.61 - 0.8 0 30 Minutes Decrease by  2 Units/kg/hr 6 hours   0.81 - 1 0 60 Minutes Decrease by  3 Units/kg/hr 6 hours   >1 0 Hold  After Anti Xa less than 0.5 decrease previous rate by  4 Units/kg/hr  Every 2 hours until Anti Xa  less than 0.5 then when infusion restarts in 6 hours         Recommend anti-Xa every 6 hours.          Date   Time   Anti-Xa Current Rate (Unit/kg/hr) Bolus   (Units) Rate Change   (Unit/kg/hr) New Rate (Unit/kg/hr) Next   Anti-Xa Comments  Pump Check Daily   7/25 1640 pending 0 no 11.60 11.60 2300 No initial bolus, rate adjusted, no s/s bleeding, d/w   Shan ROCHE   07/26 0040 0.18 11.60 2000 +2 13.60 0700 Increase rate plus bolus, no s/s bleeding per KALEY Walker    7/26 0805 0.43 13.6 - 0 13.6 1300 Therapeutic x 1. Spoke with KALEY Pacheco. No s/s bleeding.     7/26 1430 0.49 13.6 - 0 13.6 0700 on 7/27 Therapeutic x 2.  Spoke with KALEY Damico. No s/s bleeding noted. Continue same and convert to once daily anti-Xa checks now per protocol.    7/27 0715 1.04 13.6 0 Hold Hold  0930 Holding until anti-Xa less than 0.5 per protocol. Spoke with Analisa on PCU. No s/s  bleeding noted. Recheck in 2 hours.    7/27 0940 0.69 Held 0 Holding until anti-Xa less than 0.5 Hold 1130 Spoke with KALEY Pacheco on PCU. Continue to hold until anti-Xa less than 0.5.  Recheck in 2 hours.    7/27 1148 0.26 Held 0 +9.6 9.6 1800 Spoke with KALEY Damico. No s/s bleeding noted. Okay to restart drip at 4 units/kg/hr less than previous rate at this time. Recheck in 6 hrs.                                                                                                                                                                Pharmacy will continue to follow anti-Xa results and monitor for signs and symptoms of bleeding or thrombosis.    Thank you.  Celia Horta, Pharm.D.  7/27/2025  11:52 EDT

## 2025-07-28 LAB
ANION GAP SERPL CALCULATED.3IONS-SCNC: 10.5 MMOL/L (ref 5–15)
BASOPHILS # BLD AUTO: 0.04 10*3/MM3 (ref 0–0.2)
BASOPHILS NFR BLD AUTO: 0.4 % (ref 0–1.5)
BUN SERPL-MCNC: 7.6 MG/DL (ref 8–23)
BUN/CREAT SERPL: 7.1 (ref 7–25)
CALCIUM SPEC-SCNC: 7.7 MG/DL (ref 8.6–10.5)
CHLORIDE SERPL-SCNC: 105 MMOL/L (ref 98–107)
CO2 SERPL-SCNC: 21.5 MMOL/L (ref 22–29)
CREAT SERPL-MCNC: 1.07 MG/DL (ref 0.76–1.27)
DEPRECATED RDW RBC AUTO: 49.1 FL (ref 37–54)
EGFRCR SERPLBLD CKD-EPI 2021: 70.2 ML/MIN/1.73
EOSINOPHIL # BLD AUTO: 0.34 10*3/MM3 (ref 0–0.4)
EOSINOPHIL NFR BLD AUTO: 3.8 % (ref 0.3–6.2)
ERYTHROCYTE [DISTWIDTH] IN BLOOD BY AUTOMATED COUNT: 14.2 % (ref 12.3–15.4)
GLUCOSE SERPL-MCNC: 93 MG/DL (ref 65–99)
HCT VFR BLD AUTO: 40.5 % (ref 37.5–51)
HGB BLD-MCNC: 13 G/DL (ref 13–17.7)
IMM GRANULOCYTES # BLD AUTO: 0.07 10*3/MM3 (ref 0–0.05)
IMM GRANULOCYTES NFR BLD AUTO: 0.8 % (ref 0–0.5)
LYMPHOCYTES # BLD AUTO: 1.78 10*3/MM3 (ref 0.7–3.1)
LYMPHOCYTES NFR BLD AUTO: 19.8 % (ref 19.6–45.3)
MAGNESIUM SERPL-MCNC: 1.9 MG/DL (ref 1.6–2.4)
MCH RBC QN AUTO: 29.8 PG (ref 26.6–33)
MCHC RBC AUTO-ENTMCNC: 32.1 G/DL (ref 31.5–35.7)
MCV RBC AUTO: 92.9 FL (ref 79–97)
MONOCYTES # BLD AUTO: 1.07 10*3/MM3 (ref 0.1–0.9)
MONOCYTES NFR BLD AUTO: 11.9 % (ref 5–12)
NEUTROPHILS NFR BLD AUTO: 5.68 10*3/MM3 (ref 1.7–7)
NEUTROPHILS NFR BLD AUTO: 63.3 % (ref 42.7–76)
NRBC BLD AUTO-RTO: 0 /100 WBC (ref 0–0.2)
PLATELET # BLD AUTO: 214 10*3/MM3 (ref 140–450)
PMV BLD AUTO: 9.5 FL (ref 6–12)
POTASSIUM SERPL-SCNC: 3.6 MMOL/L (ref 3.5–5.2)
QT INTERVAL: 232 MS
QT INTERVAL: 384 MS
QT INTERVAL: 414 MS
QTC INTERVAL: 382 MS
QTC INTERVAL: 428 MS
QTC INTERVAL: 465 MS
RBC # BLD AUTO: 4.36 10*6/MM3 (ref 4.14–5.8)
SODIUM SERPL-SCNC: 137 MMOL/L (ref 136–145)
UFH PPP CHRO-ACNC: 0.18 IU/ML (ref 0.3–0.7)
UFH PPP CHRO-ACNC: 0.32 IU/ML (ref 0.3–0.7)
UFH PPP CHRO-ACNC: 0.61 IU/ML (ref 0.3–0.7)
WBC NRBC COR # BLD AUTO: 8.98 10*3/MM3 (ref 3.4–10.8)

## 2025-07-28 PROCEDURE — 93010 ELECTROCARDIOGRAM REPORT: CPT | Performed by: INTERNAL MEDICINE

## 2025-07-28 PROCEDURE — 93005 ELECTROCARDIOGRAM TRACING: CPT

## 2025-07-28 PROCEDURE — 85025 COMPLETE CBC W/AUTO DIFF WBC: CPT | Performed by: STUDENT IN AN ORGANIZED HEALTH CARE EDUCATION/TRAINING PROGRAM

## 2025-07-28 PROCEDURE — 80048 BASIC METABOLIC PNL TOTAL CA: CPT | Performed by: STUDENT IN AN ORGANIZED HEALTH CARE EDUCATION/TRAINING PROGRAM

## 2025-07-28 PROCEDURE — 97161 PT EVAL LOW COMPLEX 20 MIN: CPT

## 2025-07-28 PROCEDURE — 85520 HEPARIN ASSAY: CPT

## 2025-07-28 PROCEDURE — 99232 SBSQ HOSP IP/OBS MODERATE 35: CPT

## 2025-07-28 PROCEDURE — 25010000002 HEPARIN (PORCINE) 25000-0.45 UT/250ML-% SOLUTION: Performed by: STUDENT IN AN ORGANIZED HEALTH CARE EDUCATION/TRAINING PROGRAM

## 2025-07-28 PROCEDURE — 97165 OT EVAL LOW COMPLEX 30 MIN: CPT

## 2025-07-28 PROCEDURE — 83735 ASSAY OF MAGNESIUM: CPT | Performed by: STUDENT IN AN ORGANIZED HEALTH CARE EDUCATION/TRAINING PROGRAM

## 2025-07-28 PROCEDURE — 85520 HEPARIN ASSAY: CPT | Performed by: STUDENT IN AN ORGANIZED HEALTH CARE EDUCATION/TRAINING PROGRAM

## 2025-07-28 RX ORDER — HEPARIN SODIUM 10000 [USP'U]/100ML
9.6 INJECTION, SOLUTION INTRAVENOUS
Status: DISCONTINUED | OUTPATIENT
Start: 2025-07-28 | End: 2025-07-29

## 2025-07-28 RX ADMIN — HEPARIN SODIUM 7.6 UNITS/KG/HR: 10000 INJECTION, SOLUTION INTRAVENOUS at 05:07

## 2025-07-28 RX ADMIN — Medication 10 ML: at 08:26

## 2025-07-28 RX ADMIN — Medication 10 ML: at 20:27

## 2025-07-28 RX ADMIN — PANTOPRAZOLE SODIUM 40 MG: 40 INJECTION, POWDER, FOR SOLUTION INTRAVENOUS at 16:55

## 2025-07-28 RX ADMIN — TADALAFIL 5 MG: 5 TABLET ORAL at 20:27

## 2025-07-28 RX ADMIN — METOPROLOL TARTRATE 25 MG: 25 TABLET, FILM COATED ORAL at 08:26

## 2025-07-28 RX ADMIN — METOPROLOL TARTRATE 25 MG: 25 TABLET, FILM COATED ORAL at 20:26

## 2025-07-28 RX ADMIN — FINASTERIDE 5 MG: 5 TABLET, FILM COATED ORAL at 08:26

## 2025-07-28 RX ADMIN — PANTOPRAZOLE SODIUM 40 MG: 40 INJECTION, POWDER, FOR SOLUTION INTRAVENOUS at 08:26

## 2025-07-28 RX ADMIN — DILTIAZEM HYDROCHLORIDE 240 MG: 120 CAPSULE, EXTENDED RELEASE ORAL at 08:26

## 2025-07-28 NOTE — PLAN OF CARE
Problem: Adult Inpatient Plan of Care  Goal: Plan of Care Review  Outcome: Progressing  Flowsheets (Taken 7/28/2025 0223)  Progress: improving  Plan of Care Reviewed With: patient  Goal: Patient-Specific Goal (Individualized)  Outcome: Progressing  Goal: Absence of Hospital-Acquired Illness or Injury  Outcome: Progressing  Intervention: Identify and Manage Fall Risk  Recent Flowsheet Documentation  Taken 7/28/2025 0100 by Sylvia Pope RN  Safety Promotion/Fall Prevention:   safety round/check completed   room organization consistent   nonskid shoes/slippers when out of bed   fall prevention program maintained   clutter free environment maintained   assistive device/personal items within reach   activity supervised  Taken 7/27/2025 2300 by Sylvia Pope RN  Safety Promotion/Fall Prevention:   safety round/check completed   room organization consistent   nonskid shoes/slippers when out of bed   fall prevention program maintained   clutter free environment maintained   assistive device/personal items within reach   activity supervised  Taken 7/27/2025 2100 by Sylvia Pope RN  Safety Promotion/Fall Prevention:   safety round/check completed   room organization consistent   nonskid shoes/slippers when out of bed   fall prevention program maintained   clutter free environment maintained   assistive device/personal items within reach   activity supervised  Taken 7/27/2025 1900 by Sylvia Pope RN  Safety Promotion/Fall Prevention:   safety round/check completed   room organization consistent   nonskid shoes/slippers when out of bed   fall prevention program maintained   clutter free environment maintained   assistive device/personal items within reach   activity supervised  Intervention: Prevent Skin Injury  Recent Flowsheet Documentation  Taken 7/28/2025 0200 by Sylvia Pope RN  Skin Protection:   transparent dressing maintained   incontinence pads utilized  Taken 7/27/2025 2000 by Sylvia Pope  RN  Skin Protection:   transparent dressing maintained   incontinence pads utilized  Intervention: Prevent and Manage VTE (Venous Thromboembolism) Risk  Recent Flowsheet Documentation  Taken 7/27/2025 2000 by Sylvia Pope RN  VTE Prevention/Management: (heparin gtt) other (see comments)  Intervention: Prevent Infection  Recent Flowsheet Documentation  Taken 7/28/2025 0100 by Sylvia Pope RN  Infection Prevention:   rest/sleep promoted   single patient room provided  Taken 7/27/2025 2300 by Sylvia Pope RN  Infection Prevention:   rest/sleep promoted   single patient room provided  Taken 7/27/2025 2100 by Sylvia Pope RN  Infection Prevention:   rest/sleep promoted   single patient room provided  Taken 7/27/2025 1900 by Sylvia Pope RN  Infection Prevention:   rest/sleep promoted   single patient room provided  Goal: Optimal Comfort and Wellbeing  Outcome: Progressing  Intervention: Provide Person-Centered Care  Recent Flowsheet Documentation  Taken 7/28/2025 0200 by Sylvia Pope RN  Trust Relationship/Rapport:   care explained   choices provided   emotional support provided   empathic listening provided   questions answered   reassurance provided   thoughts/feelings acknowledged   questions encouraged  Taken 7/27/2025 2000 by Sylvia Pope RN  Trust Relationship/Rapport:   care explained   choices provided   emotional support provided   empathic listening provided   questions answered   questions encouraged   reassurance provided   thoughts/feelings acknowledged  Goal: Readiness for Transition of Care  Outcome: Progressing     Problem: Fall Injury Risk  Goal: Absence of Fall and Fall-Related Injury  Outcome: Progressing  Intervention: Identify and Manage Contributors  Recent Flowsheet Documentation  Taken 7/28/2025 0200 by Sylvia Pope RN  Self-Care Promotion: independence encouraged  Taken 7/28/2025 0100 by Sylvia Pope RN  Medication Review/Management: medications reviewed  Taken  7/27/2025 2300 by Sylvia Pope RN  Medication Review/Management: medications reviewed  Taken 7/27/2025 2100 by Sylvia Pope RN  Medication Review/Management: medications reviewed  Taken 7/27/2025 2000 by Sylvia Pope RN  Self-Care Promotion: independence encouraged  Taken 7/27/2025 1900 by Sylvia Pope RN  Medication Review/Management: medications reviewed  Intervention: Promote Injury-Free Environment  Recent Flowsheet Documentation  Taken 7/28/2025 0100 by Sylvia Pope RN  Safety Promotion/Fall Prevention:   safety round/check completed   room organization consistent   nonskid shoes/slippers when out of bed   fall prevention program maintained   clutter free environment maintained   assistive device/personal items within reach   activity supervised  Taken 7/27/2025 2300 by Sylvia Pope RN  Safety Promotion/Fall Prevention:   safety round/check completed   room organization consistent   nonskid shoes/slippers when out of bed   fall prevention program maintained   clutter free environment maintained   assistive device/personal items within reach   activity supervised  Taken 7/27/2025 2100 by Sylvia Pope RN  Safety Promotion/Fall Prevention:   safety round/check completed   room organization consistent   nonskid shoes/slippers when out of bed   fall prevention program maintained   clutter free environment maintained   assistive device/personal items within reach   activity supervised  Taken 7/27/2025 1900 by Sylvia Pope RN  Safety Promotion/Fall Prevention:   safety round/check completed   room organization consistent   nonskid shoes/slippers when out of bed   fall prevention program maintained   clutter free environment maintained   assistive device/personal items within reach   activity supervised   Goal Outcome Evaluation:  Plan of Care Reviewed With: patient        Progress: improving

## 2025-07-28 NOTE — PROGRESS NOTES
[facility] HOSPITALIST PROGRESS NOTE     Patient Identification:  Name:  Crow Zeng  Age:  79 y.o.  Sex:  male  :  1945  MRN:  [id number]  Visit Number:  [id number]  ROOM: [place] [address]     Primary Care Provider:  Ruby Choudhary APRN     Date of Admission: 2025    Length of stay in inpatient status:  3    Subjective     Chief Compliant:    Chief Complaint   Patient presents with    Constipation    Rapid Heart Rate     History of Presenting Illness: 79-year-old male with past medical history significant for prostate cancer, BPH, hypertension presented to emergency department  due to constipation and rapid heart rate.    Patient was complaining of abdominal distention and pain in the emergency department patient previously saw his PCP prior to arrival and they recommended him to be evaluated by the emergency department.  Patient stated that he felt unwell beginning on Tuesday had some nausea vomiting on Wednesday and Thursday however felt better on the morning of admission he states he has been passing gas and had liquid bowel movements over the last several days denies any constipation.  On arrival to the emergency department heart rate is 162 noted to be in A-fib with RVR he was hypotensive 88/62 but subsequently improved once heart rate stabilized he received adenosine, diltiazem and amiodarone while in the emergency department and placed on amiodarone drip initial troponin was 20 2 repeat was 17 creatinine was 1.48 hemoglobin was 6.0 CT abdomen pelvis showed fluid-filled distended small bowel with largest area in the left upper lower quadrant caliber transition point within the right middle quadrants resembling early/partial small bowel obstruction.  Patient was admitted to the PCU.      Patient was transition off amiodarone drip to Cardizem and metoprolol.  Patient has been tolerating diet well.    : Continuing current rate control medications, patient is in sinus rhythm heart  rate 66, blood pressure stable.  Patient still has not had bowel movement however is passing gas, patient is tolerating GI diet, low irritant.      Objective     Current Hospital Meds:  dilTIAZem CD, 240 mg, Oral, Q24H  finasteride, 5 mg, Oral, Daily  metoprolol tartrate, 25 mg, Oral, Q12H  pantoprazole, 40 mg, Intravenous, BID AC  sodium chloride, 10 mL, Intravenous, Q12H  tadalafil, 5 mg, Oral, Nightly    heparin, 7.6 Units/kg/hr (Dosing Weight), Last Rate: 7.6 Units/kg/hr (07/28/25 0507)  Pharmacy Consult,   Pharmacy to Dose Heparin,       Current Antimicrobial Therapy:  Anti-Infectives (From admission, onward)      None          Current Diuretic Therapy:  Diuretics (From admission, onward)      None          ----------------------------------------------------------------------------------------------------------------------  Vital Signs:  Temp:  [97.9 °F (36.6 °C)-98.5 °F (36.9 °C)] 97.9 °F (36.6 °C)  Heart Rate:  [53-78] 66  Resp:  [11-27] 22  BP: ()/() 119/97  SpO2:  [90 %-97 %] 97 %  on   ;   Device (Oxygen Therapy): room air  Body mass index is 29.56 kg/m².    Wt Readings from Last 3 Encounters:   07/28/25 90.8 kg (200 lb 2.8 oz)   04/09/25 90.4 kg (199 lb 6.4 oz)   10/09/24 90.3 kg (199 lb)     Intake & Output (last 3 days)         07/25 0701 07/26 0700 07/26 0701 07/27 0700 07/27 0701 07/28 0700 07/28 0701 07/29 0700    P.O. 0 0 720 480    I.V. (mL/kg) 100 (1.2) 1196 (13.9) 158 (1.8) 68.3 (0.8)    Total Intake(mL/kg) 100 (1.2) 1196 (13.9) 878 (10.2) 548.3 (6.4)    Urine (mL/kg/hr) 800 1300 (0.6) 2225 (1.1)     Stool   0     Total Output 800 1300 2225     Net -208 -104 -9222 +548.3            Urine Unmeasured Occurrence    10 x    Stool Unmeasured Occurrence    0 x          Diet: Gastrointestinal; Low Irritant; Fluid Consistency: Thin (IDDSI 0)  ----------------------------------------------------------------------------------------------------------------------  Physical  Exam  Constitutional:       General: He is not in acute distress.  HENT:      Head: Normocephalic and atraumatic.      Right Ear: External ear normal.      Left Ear: External ear normal.      Nose: Nose normal.      Mouth/Throat:      Mouth: Mucous membranes are moist.      Pharynx: Oropharynx is clear.   Eyes:      Extraocular Movements: Extraocular movements intact.      Pupils: Pupils are equal, round, and reactive to light.   Cardiovascular:      Rate and Rhythm: Normal rate.      Pulses: Normal pulses.      Heart sounds: Normal heart sounds.   Pulmonary:      Effort: Pulmonary effort is normal.      Breath sounds: Normal breath sounds.   Abdominal:      General: Bowel sounds are normal. There is no distension.      Tenderness: There is no abdominal tenderness.   Musculoskeletal:         General: No swelling.      Right lower leg: No edema.      Left lower leg: No edema.   Skin:     Coloration: Skin is not jaundiced.   Neurological:      Mental Status: He is alert and oriented to person, place, and time. Mental status is at baseline.   Psychiatric:         Mood and Affect: Mood normal.       ----------------------------------------------------------------------------------------------------------------------      LABS:    Pending test results:  Pending Labs       Order Current Status    Heparin Anti-Xa Collected (07/28/25 7350)          CBC and coagulation:  Results from last 7 days   Lab Units 07/28/25  0312 07/27/25  0630 07/25/25  2315 07/25/25  1235   LACTATE mmol/L  --   --   --  1.4   WBC 10*3/mm3 8.98 8.84 9.13 9.64   HEMOGLOBIN g/dL 13.0 13.4 13.4 14.5   HEMATOCRIT % 40.5 40.0 43.3 44.8   MCV fL 92.9 90.5 97.7* 94.1   MCHC g/dL 32.1 33.5 30.9* 32.4   PLATELETS 10*3/mm3 214 224 224 297   INR   --  1.11* 1.12* 1.05     Acid/base balance:          Renal and electrolytes:  Results from last 7 days   Lab Units 07/28/25  0312 07/27/25  0630 07/25/25  2315 07/25/25  1235   SODIUM mmol/L 137 138 137 136  "  POTASSIUM mmol/L 3.6 3.7 4.2 4.3   MAGNESIUM mg/dL 1.9  --  2.1 2.1   CHLORIDE mmol/L 105 106 108* 102   CO2 mmol/L 21.5* 21.2* 17.2* 20.2*   BUN mg/dL 7.6* 11.5 20.2 25.7*   CREATININE mg/dL 1.07 1.00 1.09 1.48*   CALCIUM mg/dL 7.7* 7.9* 7.7* 8.9   GLUCOSE mg/dL 93 89 92 112*   ANION GAP mmol/L 10.5 10.8 11.8 13.8     Estimated Creatinine Clearance: 61.3 mL/min (by C-G formula based on SCr of 1.07 mg/dL).    Liver and pancreatic function:  Results from last 7 days   Lab Units 07/27/25  0630 07/25/25  2315 07/25/25  1235   ALBUMIN g/dL 3.2* 3.2* 4.2   BILIRUBIN mg/dL 0.3 0.3 0.9   ALK PHOS U/L 71 68 87   AST (SGOT) U/L 22 18 20   ALT (SGPT) U/L 8 8 10         Endocrine function:  Lab Results   Component Value Date    HGBA1C 6.06 (H) 07/25/2025    HGBA1C 6 (H) 06/17/2025    HGBA1C 6.1 (H) 12/17/2024     Point of care bedside glucose levels:      Glucose levels from the [facility]:  Results from last 7 days   Lab Units 07/28/25  0312 07/27/25  0630 07/25/25  2315 07/25/25  1235   GLUCOSE mg/dL 93 89 92 112*     Lab Results   Component Value Date    TSH 1.330 07/25/2025     Cardiac:  Results from last 7 days   Lab Units 07/25/25  1421 07/25/25  1235   CK TOTAL U/L 134  --    HSTROP T ng/L 17 22*     Results from last 7 days   Lab Units 07/25/25  1421   CHOLESTEROL mg/dL 125   TRIGLYCERIDES mg/dL 102   HDL CHOL mg/dL 38*   LDL CHOL mg/dL 68     Cultures:  Lab Results   Component Value Date    COLORU Yellow 07/25/2025    CLARITYU Clear 07/25/2025    SPECGRAV 1.015 01/18/2023    PHUR 5.5 07/25/2025    GLUCOSEU Negative 07/25/2025    KETONESU Trace (A) 07/25/2025    BLOODU Negative 07/25/2025    NITRITEU Negative 07/25/2025    LEUKOCYTESUR Negative 07/25/2025    BILIRUBINUR Negative 07/25/2025    UROBILINOGEN 0.2 E.U./dL 07/25/2025     Microbiology Results (last 10 days)       ** No results found for the last 240 hours. **            No results found for: \"PREGTESTUR\", \"PREGSERUM\", \"HCG\", \"HCGQUANT\"  Pain Management Panel       "     No data to display                I have personally looked at the labs and they are summarized above.  ----------------------------------------------------------------------------------------------------------------------  Detailed radiology reports for the last 24 hours:    Imaging Results (Last 24 Hours)       ** No results found for the last 24 hours. **              Assessment & Plan      New onset atrial flutter with RVR:  - Continue Cardizem and Lopressor  - Continue heparin GTT until SBO completely resolved and patient has bowel movement then transition to oral Eliquis.  - Echocardiogram shows EF of 51 to 55%    Partial small bowel obstruction:  - CT abdomen pelvis with contrast noted dilated distal small bowel with transition point in the right mid quadrant.  - Avoid NG tube  - Tolerating oral diet  - PPI IV twice daily  - Awaiting bowel movement    ERIC resolved  Prostate cancer  BPH:  - Continuing Cialis  - No urinary retention at this time      VTE Prophylaxis: Heparin GTT  PPI: Protonix      : Consulted  Physical therapy/Occupational Therapy: Consulted    Disposition: To return home with spouse at discharge    Surya Simmons MD  [facility]  07/28/25  17:49 EDT

## 2025-07-28 NOTE — THERAPY EVALUATION
Acute Care - Occupational Therapy Initial Evaluation   [facility]     Patient Name: Crow Zeng  : 1945  MRN: [id number]  Today's Date: 2025             Admit Date: 2025       ICD-10-CM ICD-9-CM   1. Atrial fibrillation with RVR  I48.91 427.31   2. 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    Essential hypertension    Mixed hyperlipidemia    Atrial fibrillation with RVR     Past Medical History:   Diagnosis Date    BPH with elevated PSA 2023    Elevated PSA     Hypertension      Past Surgical History:   Procedure Laterality Date    APPENDECTOMY      SKIN GRAFT           OT ASSESSMENT FLOWSHEET (Last 12 Hours)       OT Evaluation and Treatment       Row Name 25 1335                   OT Time and Intention    Document Type re-evaluation  -KR        Mode of Treatment occupational therapy  -KR        Patient Effort good  -KR           General Information    Prior Level of Function independent:  -KR        Equipment Currently Used at Home none  -KR           Living Environment    Current Living Arrangements home  -KR        People in Home spouse  -KR        Primary Care Provided by self  -KR           Cognition    Affect/Mental Status (Cognition) WFL  -KR        Orientation Status (Cognition) oriented x 4  -KR        Follows Commands (Cognition) WFL  -KR           Range of Motion Comprehensive    Comment, General Range of Motion BUE WFL  -KR           Strength Comprehensive (MMT)    Comment, General Manual Muscle Testing (MMT) Assessment BUE WFL  -KR           Activities of Daily Living    BADL Assessment/Intervention --  Independent with self care  -KR           Plan of Care Review    Plan of Care Reviewed With patient;spouse  -KR           Therapy Assessment/Plan (OT)    Therapy Assessment/Plan (OT) Pt presents independent with self care. BUE WFL. No further need for skilled OT at this  time. Pt will return to prior living arrangement.  -KR           Therapy Plan Review/Discharge Plan (OT)    Anticipated Discharge Disposition (OT) home  -KR                  User Key  (r) = Recorded By, (t) = Taken By, (c) = Cosigned By      Initials Name Effective Dates    Jesse Corea OT 06/16/21 -                            OT Recommendation and Plan     Plan of Care Review  Plan of Care Reviewed With: patient, spouse  Plan of Care Reviewed With: patient, spouse        Time Calculation:     Therapy Charges for Today       Code Description Service Date Service Provider Modifiers Qty    [id number]  OT EVAL LOW COMPLEXITY 4 7/28/2025 Jesse Law OT GO 1                 Jesse Law OT  7/28/2025

## 2025-07-28 NOTE — CASE MANAGEMENT/SOCIAL WORK
Discharge Planning Assessment  [facility]     Patient Name: Crow Zeng  MRN: [id number]  Today's Date: 7/28/2025    Admit Date: 7/25/2025    Plan: SS received CM consult for d/c planning r/t age. SS spoke with pt and spouse Mirta at bedside on this date. Pt lives at home with spouse and plans to return back home at discharge. Pt PCP Ruby Choudhary. Pt does not utilize HH services or DME. Pt has no POA or living will. Pt PLOF is independent. Pt's spouse to provide transportation at discharge. SS to sign off at this time. Please re-consult SS back if needed.   Discharge Needs Assessment       Row Name 07/28/25 1145       Living Environment    People in Home spouse    Name(s) of People in Home spouse Mirta    Current Living Arrangements home    Potentially Unsafe Housing Conditions none    Primary Care Provided by self    Provides Primary Care For no one    Family Caregiver if Needed spouse    Family Caregiver Names spouse Mirta    Quality of Family Relationships helpful;involved;supportive    Able to Return to Prior Arrangements yes       Resource/Environmental Concerns    Resource/Environmental Concerns none       Transition Planning    Patient/Family Anticipates Transition to home    Transportation Anticipated family or friend will provide       Discharge Needs Assessment    Equipment Currently Used at Home none    Concerns to be Addressed --  d/c planning r/t age                   Discharge Plan       Row Name 07/28/25 1145       Plan    Plan SS received CM consult for d/c planning r/t age. SS spoke with pt and spouse Mirta at bedside on this date. Pt lives at home with spouse and plans to return back home at discharge. Pt PCP Ruby Choudhary. Pt does not utilize HH services or DME. Pt has no POA or living will. Pt PLOF is independent. Pt's spouse to provide transportation at discharge. SS to sign off at this time. Please re-consult SS back if needed.               Expected Discharge Date and Time        Expected Discharge Date Expected Discharge Time    Jul 28, 2025            Demographic Summary       Row Name 07/28/25 1144       General Information    Admission Type inpatient    Arrived From emergency department    Referral Source nursing  CM    Reason for Consult --  d/c planning r/t age    Preferred Language English               JL MasseyW

## 2025-07-28 NOTE — PROGRESS NOTES
[facility] INFUSION  Crow Zeng is a  79 y.o. male receiving heparin infusion.     Therapy for (VTE/Cardiac):   cardiac  Patient Dosing Weight:   86.2  kg  Initial Bolus (Y/N):     no  Any Bolus (Y/N):       yes    Signs or Symptoms of Bleeding:     no    Cardiac or Other (Not VTE)   Initial Bolus: 60 units/kg (Max 4,000 units)  Initial rate: 12 units/kg/hr (Max 1,000 units/hr)   Anti Xa Rebolus Infusion Hold time Change infusion Dose (Units/kg/hr) Next Anti Xa or aPTT Level Due   < 0.11 50 Units/kg  (4000 Units Max) None Increase by  3 Units/kg/hr 6 hours   0.11- 0.19 25 Units/kg  (2000 Units Max) None Increase by  2 Units/kg/hr 6 hours   0.2 - 0.29 0 None Increase by  1 Units/kg/hr 6 hours   0.3 - 0.5 0 None No Change 6 hours (after 2 consecutive levels in range check q24h @0700)   0.51 - 0.6 0 None Decrease by  1 Units/kg/hr 6 hours   0.61 - 0.8 0 30 Minutes Decrease by  2 Units/kg/hr 6 hours   0.81 - 1 0 60 Minutes Decrease by  3 Units/kg/hr 6 hours   >1 0 Hold  After Anti Xa less than 0.5 decrease previous rate by  4 Units/kg/hr  Every 2 hours until Anti Xa  less than 0.5 then when infusion restarts in 6 hours         Recommend anti-Xa every 6 hours.          Date   Time   Anti-Xa Current Rate (Unit/kg/hr) Bolus   (Units) Rate Change   (Unit/kg/hr) New Rate (Unit/kg/hr) Next   Anti-Xa Comments  Pump Check Daily   7/25 1640 pending 0 no 11.60 11.60 2300 No initial bolus, rate adjusted, no s/s bleeding, d/w   Shan ROCHE   07/26 0040 0.18 11.60 2000 +2 13.60 0700 Increase rate plus bolus, no s/s bleeding per KALEY Walker    7/26 0805 0.43 13.6 - 0 13.6 1300 Therapeutic x 1. Spoke with KALEY Pacheco. No s/s bleeding.     7/26 1430 0.49 13.6 - 0 13.6 0700 on 7/27 Therapeutic x 2.  Spoke with KALEY Damico. No s/s bleeding noted. Continue same and convert to once daily anti-Xa checks now per protocol.    7/27 0715 1.04 13.6 0 Hold Hold  0930 Holding until anti-Xa less than 0.5 per protocol. Spoke with Analisa on PCU. No s/s  bleeding noted. Recheck in 2 hours.    7/27 0940 0.69 Held 0 Holding until anti-Xa less than 0.5 Hold 1130 Spoke with KALEY Pacheco on PCU. Continue to hold until anti-Xa less than 0.5.  Recheck in 2 hours.    7/27 1148 0.26 Held 0 +9.6 9.6 1800 Spoke with KALEY Damico. No s/s bleeding noted. Okay to restart drip at 4 units/kg/hr less than previous rate at this time. Recheck in 6 hrs.    7/27 2000 0.4 9.6 0 0 9.6 0300 DW with rn no changes . Will check again on next draw.    07/28 0420 0.61 9.6 - -2 7.6 1100 Hold x 30 min then decrease rate by 2 u/kg/hr.  No s/s bleeding per KALEY Ward    7/28 1130 0.32 7.6 - - 7.6 1800 Therapeutic, maintain current rate, no s/s bleeding per KALEY Garner.                                                                                                                               Pharmacy will continue to follow anti-Xa results and monitor for signs and symptoms of bleeding or thrombosis.

## 2025-07-28 NOTE — PLAN OF CARE
Goal Outcome Evaluation:     Problem: Adult Inpatient Plan of Care  Goal: Plan of Care Review  Outcome: Progressing  Flowsheets  Taken 7/28/2025 1636 by Ada Staley, RN  Progress: improving  Outcome Evaluation: Pt is alert and oriented. VSS on room air. Aflutter. Denies any pain. Ambulated in hallway with assist x1. Heparin gtt infusing. No BM this shift. Care ongoing.  Taken 7/28/2025 0223 by Sylvia Pope RN  Plan of Care Reviewed With: patient     Problem: Adult Inpatient Plan of Care  Goal: Patient-Specific Goal (Individualized)  Outcome: Progressing           Progress: improving  Outcome Evaluation: Pt is alert and oriented. VSS on room air. Aflutter. Denies any pain. Ambulated in hallway with assist x1. Heparin gtt infusing. No BM this shift. Care ongoing.

## 2025-07-28 NOTE — THERAPY EVALUATION
Acute Care - Physical Therapy Initial Evaluation   Jorge     Patient Name: Crow Zeng  : 1945  MRN: [id number]  Today's Date: 2025   Onset of Illness/Injury or Date of Surgery: 25  Visit Dx:     ICD-10-CM ICD-9-CM   1. Atrial fibrillation with RVR  I48.91 427.31   2. 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    Essential hypertension    Mixed hyperlipidemia    Atrial fibrillation with RVR     Past Medical History:   Diagnosis Date    BPH with elevated PSA 2023    Elevated PSA     Hypertension      Past Surgical History:   Procedure Laterality Date    APPENDECTOMY      SKIN GRAFT       PT Assessment (Last 12 Hours)       PT Evaluation and Treatment       Row Name 25 143          Physical Therapy Time and Intention    Subjective Information no complaints  -AG     Document Type evaluation  -AG     Mode of Treatment physical therapy  -AG     Patient Effort good  -AG     Symptoms Noted During/After Treatment none  -AG       Row Name 25 1431          General Information    Patient Profile Reviewed yes  -AG     Onset of Illness/Injury or Date of Surgery 25  -AG     Referring Physician Dr. Pace  -AG     Patient Observations agree to therapy;cooperative;alert  -AG     Patient/Family/Caregiver Comments/Observations pt. seated EOB in PCU; on room air.  Pt. is pleasant, cooperative for evaluation.  -AG     Prior Level of Function independent:;community mobility  -AG     Equipment Currently Used at Home none  states he has owned a cane for ~40 years but never used it.  -AG     Pertinent History of Current Functional Problem pt. admitted with A-fib w/ RVR  -AG     Existing Precautions/Restrictions no known precautions/restrictions  -AG     Barriers to Rehab none identified  -AG       Row Name 25 1431          Previous Level of Function/Home Environm    BADLs, Previous  Functional Level independent  -AG     Community Ambulation, Previous Functional Level independent  -AG       [facility] Name 07/28/25 1431          Living Environment    Current Living Arrangements home  -AG     People in Home spouse  -AG     Primary Care Provided by self  -AG       Row Name 07/28/25 1431          Home Use of Assistive/Adaptive Equipment    Equipment Currently Used at Home none  -AG       [facility] Name 07/28/25 1431          Pain    Pretreatment Pain Rating 0/10 - no pain  -AG     Posttreatment Pain Rating 0/10 - no pain  -AG       [facility] Name 07/28/25 1431          Cognition    Affect/Mental Status (Cognition) WNL  -AG     Orientation Status (Cognition) oriented x 4  -AG     Follows Commands (Cognition) WFL  -AG       [facility] Name 07/28/25 1431          Range of Motion Comprehensive    General Range of Motion no range of motion deficits identified  -AG       Row Name 07/28/25 1431          Strength (Manual Muscle Testing)    Strength (Manual Muscle Testing) strength is WFL;bilateral lower extremities  -AG       [facility] Name 07/28/25 1431          Bed Mobility    Bed Mobility supine-sit;sit-supine  -AG     Supine-Sit [place] (Bed Mobility) independent  -AG     Sit-Supine [place] (Bed Mobility) independent  -AG       [facility] Name 07/28/25 1431          Transfers    Transfers sit-stand transfer;stand-sit transfer;stand pivot/stand step transfer  -AG       [facility] Name 07/28/25 1431          Sit-Stand Transfer    Sit-Stand [place] (Transfers) independent  -AG     Assistive Device (Sit-Stand Transfers) --  none  -AG       Row Name 07/28/25 1431          Stand-Sit Transfer    Stand-Sit [place] (Transfers) independent  -AG     Assistive Device (Stand-Sit Transfers) --  none  -AG       Row Name 07/28/25 1431          Stand Pivot/Stand Step Transfer    Stand Pivot/Stand Step [place] (Transfers) independent  -AG     Assistive Device (Stand Pivot Stand Step Transfer) --  none  -AG       Row Name 07/28/25 1431           Gait/Stairs (Locomotion)    [place] Level (Gait) independent  -AG     Assistive Device (Gait) --  none  -AG     Patient was able to Ambulate yes  -AG     Distance in Feet (Gait) 150  -AG     Pattern (Gait) step-through  -AG       [facility] Name 07/28/25 1431          Balance    Balance Assessment sitting static balance;sitting dynamic balance;sit to stand dynamic balance;standing static balance;standing dynamic balance  -AG     Static Sitting Balance independent  -AG     Position, Sitting Balance unsupported;sitting edge of bed  -AG     Static Standing Balance independent  -AG     Dynamic Standing Balance independent  -AG     Position/Device Used, Standing Balance --  none  -AG       Row Name 07/28/25 1431          Coping    Observed Emotional State calm;cooperative;pleasant  -AG     Verbalized Emotional State acceptance  -AG     Trust Relationship/Rapport care explained;choices provided;thoughts/feelings acknowledged  -AG     Family/Support Persons spouse  -AG     Involvement in Care not present at bedside  -AG     Family/Support System Care self-care encouraged  -AG       Row Name 07/28/25 1431          Plan of Care Review    Plan of Care Reviewed With patient  -AG     Outcome Evaluation PT evaluation performed.  Pt. demonstrates independent functional mobility skills without use of assistive device; no significant gait deviation noted.  Pt. is ambulating in hallway, pushing IV pole.  Recommend pt. to continue ambulation ad bhavin with nursing staff to supervise if necessary.  No further skilled inpatient PT warranted at this time.  -AG       [facility] Name 07/28/25 1431          Vital Signs    Pre Systolic BP Rehab 136  -AG     Pre Treatment Diastolic BP 96  -AG     Posttreatment Heart Rate (beats/min) 77  -AG     Posttreatment Resp Rate (breaths/min) 21  -AG     Intra SpO2 (%) 98  -AG     O2 Delivery Intra Treatment room air  -AG     Post Patient Position Standing  -AG       [facility] Name 07/28/25 1431          Positioning  and Restraints    Pre-Treatment Position in bed  -AG     Post Treatment Position bed  -AG     In Bed fowlers;call light within reach;encouraged to call for assist;side rails up x3  -AG       Row Name 07/28/25 [facility]          Therapy Assessment/Plan (PT)    Patient/Family Therapy Goals Statement (PT) return home with spouse  -AG     Functional Level at Time of Evaluation (PT) independent  -AG     Criteria for Skilled Interventions Met (PT) no;no problems identified which require skilled intervention  -AG     Therapy Frequency (PT) evaluation only  -AG       Row Name 07/28/25 1431          Therapy Plan Review/Discharge Plan (PT)    Therapy Plan Review (PT) patient;participants included;evaluation/treatment results reviewed  -               User Key  (r) = Recorded By, (t) = Taken By, (c) = Cosigned By      Initials Name Provider Type     Candace Urena, PT Physical Therapist                    Physical Therapy Education       Title: PT OT SLP Therapies (Done)       Topic: Physical Therapy (Done)       Point: Mobility training (Done)       Learning Progress Summary            Patient Acceptance, E,D, VU,DU by  at 7/28/2025 1431                      Point: Home exercise program (Done)       Learning Progress Summary            Patient Acceptance, E,D, VU,DU by  at 7/28/2025 1431                      Point: Body mechanics (Done)       Learning Progress Summary            Patient Acceptance, E,D, VU,DU by  at 7/28/2025 1431                      Point: Precautions (Done)       Learning Progress Summary            Patient Acceptance, E,D, VU,DU by  at 7/28/2025 1431                                      User Key       Initials Effective Dates Name Provider Type [facility] 06/16/21 -  Candace Urena, PT Physical Therapist PT                  PT Recommendation and Plan  Anticipated Discharge Disposition (PT): home (home with spouse)  Therapy Frequency (PT): evaluation only  Plan of Care Reviewed With:  patient  Outcome Evaluation: PT evaluation performed.  Pt. demonstrates independent functional mobility skills without use of assistive device; no significant gait deviation noted.  Pt. is ambulating in hallway, pushing IV pole.  Recommend pt. to continue ambulation ad bhavin with nursing staff to supervise if necessary.  No further skilled inpatient PT warranted at this time.       Time Calculation:    PT Charges       Row Name 07/28/25 1430             Time Calculation    PT Received On 07/28/25  -                User Key  (r) = Recorded By, (t) = Taken By, (c) = Cosigned By      Initials Name Provider Type     Candace Urena, PT Physical Therapist                  Therapy Charges for Today       Code Description Service Date Service Provider Modifiers Qty    52706167422 HC PT EVAL LOW COMPLEXITY 4 7/28/2025 Candace Urena, PT GP 1            PT G-Codes  AM-PAC 6 Clicks Score (PT): 24    Candace Urena PT  7/28/2025

## 2025-07-29 ENCOUNTER — APPOINTMENT (OUTPATIENT)
Dept: GENERAL RADIOLOGY | Facility: HOSPITAL | Age: 80
End: 2025-07-29
Payer: MEDICARE

## 2025-07-29 PROBLEM — K80.20 GALLSTONES: Status: ACTIVE | Noted: 2025-07-25

## 2025-07-29 LAB
ANION GAP SERPL CALCULATED.3IONS-SCNC: 14.1 MMOL/L (ref 5–15)
BASOPHILS # BLD AUTO: 0.06 10*3/MM3 (ref 0–0.2)
BASOPHILS NFR BLD AUTO: 0.5 % (ref 0–1.5)
BUN SERPL-MCNC: 9.9 MG/DL (ref 8–23)
BUN/CREAT SERPL: 7.9 (ref 7–25)
CALCIUM SPEC-SCNC: 8.8 MG/DL (ref 8.6–10.5)
CHLORIDE SERPL-SCNC: 101 MMOL/L (ref 98–107)
CO2 SERPL-SCNC: 20.9 MMOL/L (ref 22–29)
CREAT SERPL-MCNC: 1.25 MG/DL (ref 0.76–1.27)
DEPRECATED RDW RBC AUTO: 50.4 FL (ref 37–54)
EGFRCR SERPLBLD CKD-EPI 2021: 58.2 ML/MIN/1.73
EOSINOPHIL # BLD AUTO: 0.28 10*3/MM3 (ref 0–0.4)
EOSINOPHIL NFR BLD AUTO: 2.2 % (ref 0.3–6.2)
ERYTHROCYTE [DISTWIDTH] IN BLOOD BY AUTOMATED COUNT: 14.5 % (ref 12.3–15.4)
GLUCOSE SERPL-MCNC: 136 MG/DL (ref 65–99)
HCT VFR BLD AUTO: 44.2 % (ref 37.5–51)
HCT VFR BLD AUTO: 47.5 % (ref 37.5–51)
HGB BLD-MCNC: 14.6 G/DL (ref 13–17.7)
HGB BLD-MCNC: 15 G/DL (ref 13–17.7)
IMM GRANULOCYTES # BLD AUTO: 0.16 10*3/MM3 (ref 0–0.05)
IMM GRANULOCYTES NFR BLD AUTO: 1.2 % (ref 0–0.5)
LYMPHOCYTES # BLD AUTO: 1.98 10*3/MM3 (ref 0.7–3.1)
LYMPHOCYTES NFR BLD AUTO: 15.4 % (ref 19.6–45.3)
MCH RBC QN AUTO: 29.9 PG (ref 26.6–33)
MCHC RBC AUTO-ENTMCNC: 31.6 G/DL (ref 31.5–35.7)
MCV RBC AUTO: 94.8 FL (ref 79–97)
MONOCYTES # BLD AUTO: 1.23 10*3/MM3 (ref 0.1–0.9)
MONOCYTES NFR BLD AUTO: 9.6 % (ref 5–12)
NEUTROPHILS NFR BLD AUTO: 71.1 % (ref 42.7–76)
NEUTROPHILS NFR BLD AUTO: 9.15 10*3/MM3 (ref 1.7–7)
NRBC BLD AUTO-RTO: 0 /100 WBC (ref 0–0.2)
PLATELET # BLD AUTO: 288 10*3/MM3 (ref 140–450)
PMV BLD AUTO: 9.4 FL (ref 6–12)
POTASSIUM SERPL-SCNC: 4.1 MMOL/L (ref 3.5–5.2)
RBC # BLD AUTO: 5.01 10*6/MM3 (ref 4.14–5.8)
SODIUM SERPL-SCNC: 136 MMOL/L (ref 136–145)
UFH PPP CHRO-ACNC: 0.26 IU/ML (ref 0.3–0.7)
WBC NRBC COR # BLD AUTO: 12.86 10*3/MM3 (ref 3.4–10.8)

## 2025-07-29 PROCEDURE — 99232 SBSQ HOSP IP/OBS MODERATE 35: CPT

## 2025-07-29 PROCEDURE — 85520 HEPARIN ASSAY: CPT

## 2025-07-29 PROCEDURE — 25810000003 SODIUM CHLORIDE 0.9 % SOLUTION

## 2025-07-29 PROCEDURE — 74018 RADEX ABDOMEN 1 VIEW: CPT | Performed by: RADIOLOGY

## 2025-07-29 PROCEDURE — 85025 COMPLETE CBC W/AUTO DIFF WBC: CPT | Performed by: STUDENT IN AN ORGANIZED HEALTH CARE EDUCATION/TRAINING PROGRAM

## 2025-07-29 PROCEDURE — 85014 HEMATOCRIT: CPT

## 2025-07-29 PROCEDURE — 25010000002 ONDANSETRON PER 1 MG: Performed by: STUDENT IN AN ORGANIZED HEALTH CARE EDUCATION/TRAINING PROGRAM

## 2025-07-29 PROCEDURE — 74018 RADEX ABDOMEN 1 VIEW: CPT

## 2025-07-29 PROCEDURE — 85018 HEMOGLOBIN: CPT

## 2025-07-29 PROCEDURE — 80048 BASIC METABOLIC PNL TOTAL CA: CPT | Performed by: STUDENT IN AN ORGANIZED HEALTH CARE EDUCATION/TRAINING PROGRAM

## 2025-07-29 RX ORDER — SODIUM CHLORIDE 9 MG/ML
75 INJECTION, SOLUTION INTRAVENOUS CONTINUOUS
Status: ACTIVE | OUTPATIENT
Start: 2025-07-29 | End: 2025-07-30

## 2025-07-29 RX ADMIN — PANTOPRAZOLE SODIUM 40 MG: 40 INJECTION, POWDER, FOR SOLUTION INTRAVENOUS at 08:16

## 2025-07-29 RX ADMIN — DILTIAZEM HYDROCHLORIDE 240 MG: 120 CAPSULE, EXTENDED RELEASE ORAL at 08:16

## 2025-07-29 RX ADMIN — FINASTERIDE 5 MG: 5 TABLET, FILM COATED ORAL at 08:16

## 2025-07-29 RX ADMIN — Medication 10 ML: at 08:19

## 2025-07-29 RX ADMIN — METOPROLOL TARTRATE 25 MG: 25 TABLET, FILM COATED ORAL at 08:16

## 2025-07-29 RX ADMIN — PANTOPRAZOLE SODIUM 40 MG: 40 INJECTION, POWDER, FOR SOLUTION INTRAVENOUS at 17:02

## 2025-07-29 RX ADMIN — TADALAFIL 5 MG: 5 TABLET ORAL at 23:37

## 2025-07-29 RX ADMIN — Medication 10 ML: at 20:57

## 2025-07-29 RX ADMIN — ONDANSETRON 4 MG: 2 INJECTION INTRAMUSCULAR; INTRAVENOUS at 05:35

## 2025-07-29 RX ADMIN — ONDANSETRON 4 MG: 2 INJECTION INTRAMUSCULAR; INTRAVENOUS at 17:02

## 2025-07-29 RX ADMIN — SODIUM CHLORIDE 75 ML/HR: 9 INJECTION, SOLUTION INTRAVENOUS at 13:18

## 2025-07-29 RX ADMIN — METOPROLOL TARTRATE 25 MG: 25 TABLET, FILM COATED ORAL at 20:57

## 2025-07-29 NOTE — PROGRESS NOTES
[facility] HOSPITALIST PROGRESS NOTE     Patient Identification:  Name:  Crow Zeng  Age:  79 y.o.  Sex:  male  :  1945  MRN:  [id number]  Visit Number:  [id number]  ROOM: [place] [address]     Primary Care Provider:  Ruby Choudhary APRN     Date of Admission: 2025    Length of stay in inpatient status:  4    Subjective     Chief Compliant:    Chief Complaint   Patient presents with    Constipation    Rapid Heart Rate     History of Presenting Illness: 79-year-old male with past medical history significant for prostate cancer, BPH, hypertension presented to emergency department  due to constipation and rapid heart rate.    Patient was complaining of abdominal distention and pain in the emergency department patient previously saw his PCP prior to arrival and they recommended him to be evaluated by the emergency department.  Patient stated that he felt unwell beginning on Tuesday had some nausea vomiting on Wednesday and Thursday however felt better on the morning of admission he states he has been passing gas and had liquid bowel movements over the last several days denies any constipation.  On arrival to the emergency department heart rate is 162 noted to be in A-fib with RVR he was hypotensive 88/62 but subsequently improved once heart rate stabilized he received adenosine, diltiazem and amiodarone while in the emergency department and placed on amiodarone drip initial troponin was 20 2 repeat was 17 creatinine was 1.48 hemoglobin was 6.0 CT abdomen pelvis showed fluid-filled distended small bowel with largest area in the left upper lower quadrant caliber transition point within the right middle quadrants resembling early/partial small bowel obstruction.  Patient was admitted to the PCU.      Patient was transition off amiodarone drip to Cardizem and metoprolol.  Patient has been tolerating diet well.    : Continuing current rate control medications, patient is in sinus rhythm heart  rate 66, blood pressure stable.  Patient still has not had bowel movement however is passing gas, patient is tolerating GI diet, low irritant.    7/29: Urinary rentention ON, patient had Guzmán catheter placed, seem to have trauma had hematuria after Guzmán placement, heparin was held, hemoglobin has been stable, patient in normal sinus rhythm, patient also had some leaking around Guzmán catheter, 10 cc more of saline was placed in Guzmán bulb, leaking has stopped.  On evaluation urine was clear and yellow at the top of the Guzmán catheter, and no bleeding noted around urethral meatus.  General surgery was consulted due to nausea and vomiting and inability to tolerate diet, concern for worsening of SBO however general surgery thought it was likely due to his gallstones, planning for cholecystectomy tomorrow.  They recommend continue to hold heparin at this time and maintaining patient n.p.o. until after surgery.      Objective     Current Hospital Meds:  dilTIAZem CD, 240 mg, Oral, Q24H  finasteride, 5 mg, Oral, Daily  metoprolol tartrate, 25 mg, Oral, Q12H  pantoprazole, 40 mg, Intravenous, BID AC  sodium chloride, 10 mL, Intravenous, Q12H  tadalafil, 5 mg, Oral, Nightly    Pharmacy Consult,   [Held by provider] Pharmacy to Dose Heparin,       Current Antimicrobial Therapy:  Anti-Infectives (From admission, onward)      None          Current Diuretic Therapy:  Diuretics (From admission, onward)      None          ----------------------------------------------------------------------------------------------------------------------  Vital Signs:  Temp:  [97.9 °F (36.6 °C)-98.7 °F (37.1 °C)] 98.3 °F (36.8 °C)  Heart Rate:  [] 75  Resp:  [13-27] 18  BP: ()/() 145/103  SpO2:  [90 %-98 %] 92 %  on   ;   Device (Oxygen Therapy): room air  Body mass index is 28.75 kg/m².    Wt Readings from Last 3 Encounters:   07/29/25 88.3 kg (194 lb 11.2 oz)   04/09/25 90.4 kg (199 lb 6.4 oz)   10/09/24 90.3 kg (199 lb)      Intake & Output (last 3 days)         07/26 0701 07/27 0700 07/27 0701 07/28 0700 07/28 0701 07/29 0700 07/29 0701 07/30 0700    P.O. 0 720 480 0    I.V. (mL/kg) 1196 (13.9) 158 (1.8) 143.3 (1.7)     Total Intake(mL/kg) 1196 (13.9) 878 (10.2) 623.3 (7.2) 0 (0)    Urine (mL/kg/hr) 1300 (0.6) 2225 (1.1) 1100 (0.5)     Stool  0      Total Output 1300 2225 1100     Net -104 -0585 -786.7 0            Urine Unmeasured Occurrence   10 x     Stool Unmeasured Occurrence   0 x           NPO Diet NPO Type: Sips with Meds  ----------------------------------------------------------------------------------------------------------------------  Physical Exam  Constitutional:       General: He is not in acute distress.  HENT:      Head: Normocephalic and atraumatic.      Right Ear: External ear normal.      Left Ear: External ear normal.      Nose: Nose normal.      Mouth/Throat:      Mouth: Mucous membranes are moist.      Pharynx: Oropharynx is clear.   Eyes:      Extraocular Movements: Extraocular movements intact.      Pupils: Pupils are equal, round, and reactive to light.   Cardiovascular:      Rate and Rhythm: Normal rate and regular rhythm.      Pulses: Normal pulses.      Heart sounds: Normal heart sounds.   Pulmonary:      Effort: Pulmonary effort is normal.      Breath sounds: Normal breath sounds.   Abdominal:      General: Bowel sounds are normal. There is no distension.      Palpations: Abdomen is soft.   Genitourinary:     Penis: Normal.       Comments: Guzmán catheter in place  Musculoskeletal:         General: No swelling.      Right lower leg: No edema.      Left lower leg: No edema.   Skin:     Coloration: Skin is not jaundiced.   Neurological:      Mental Status: He is alert and oriented to person, place, and time. Mental status is at baseline.   Psychiatric:         Mood and Affect: Mood normal.        ----------------------------------------------------------------------------------------------------------------------      LABS:    Pending test results:      CBC and coagulation:  Results from last 7 days   Lab Units 07/29/25  1154 07/29/25 0138 07/28/25 0312 07/27/25 0630 07/25/25 2315 07/25/25  1235   LACTATE mmol/L  --   --   --   --   --  1.4   WBC 10*3/mm3  --  12.86* 8.98 8.84 9.13 9.64   HEMOGLOBIN g/dL 14.6 15.0 13.0 13.4 13.4 14.5   HEMATOCRIT % 44.2 47.5 40.5 40.0 43.3 44.8   MCV fL  --  94.8 92.9 90.5 97.7* 94.1   MCHC g/dL  --  31.6 32.1 33.5 30.9* 32.4   PLATELETS 10*3/mm3  --  288 214 224 224 297   INR   --   --   --  1.11* 1.12* 1.05     Acid/base balance:          Renal and electrolytes:  Results from last 7 days   Lab Units 07/29/25  0138 07/28/25 0312 07/27/25 0630 07/25/25 2315 07/25/25  1235   SODIUM mmol/L 136 137 138 137 136   POTASSIUM mmol/L 4.1 3.6 3.7 4.2 4.3   MAGNESIUM mg/dL  --  1.9  --  2.1 2.1   CHLORIDE mmol/L 101 105 106 108* 102   CO2 mmol/L 20.9* 21.5* 21.2* 17.2* 20.2*   BUN mg/dL 9.9 7.6* 11.5 20.2 25.7*   CREATININE mg/dL 1.25 1.07 1.00 1.09 1.48*   CALCIUM mg/dL 8.8 7.7* 7.9* 7.7* 8.9   GLUCOSE mg/dL 136* 93 89 92 112*   ANION GAP mmol/L 14.1 10.5 10.8 11.8 13.8     Estimated Creatinine Clearance: 51.8 mL/min (by C-G formula based on SCr of 1.25 mg/dL).    Liver and pancreatic function:  Results from last 7 days   Lab Units 07/27/25  0630 07/25/25  2315 07/25/25  1235   ALBUMIN g/dL 3.2* 3.2* 4.2   BILIRUBIN mg/dL 0.3 0.3 0.9   ALK PHOS U/L 71 68 87   AST (SGOT) U/L 22 18 20   ALT (SGPT) U/L 8 8 10         Endocrine function:  Lab Results   Component Value Date    HGBA1C 6.06 (H) 07/25/2025    HGBA1C 6 (H) 06/17/2025    HGBA1C 6.1 (H) 12/17/2024     Point of care bedside glucose levels:      Glucose levels from the [facility]:  Results from last 7 days   Lab Units 07/29/25  0138 07/28/25  0312 07/27/25  0630 07/25/25  2315 07/25/25  1235   GLUCOSE mg/dL 136* 93 89 92  "112*     Lab Results   Component Value Date    TSH 1.330 07/25/2025     Cardiac:  Results from last 7 days   Lab Units 07/25/25  1421 07/25/25  1235   CK TOTAL U/L 134  --    HSTROP T ng/L 17 22*     Results from last 7 days   Lab Units 07/25/25  1421   CHOLESTEROL mg/dL 125   TRIGLYCERIDES mg/dL 102   HDL CHOL mg/dL 38*   LDL CHOL mg/dL 68     Cultures:  Lab Results   Component Value Date    COLORU Yellow 07/25/2025    CLARITYU Clear 07/25/2025    SPECGRAV 1.015 01/18/2023    PHUR 5.5 07/25/2025    GLUCOSEU Negative 07/25/2025    KETONESU Trace (A) 07/25/2025    BLOODU Negative 07/25/2025    NITRITEU Negative 07/25/2025    LEUKOCYTESUR Negative 07/25/2025    BILIRUBINUR Negative 07/25/2025    UROBILINOGEN 0.2 E.U./dL 07/25/2025     Microbiology Results (last 10 days)       ** No results found for the last 240 hours. **            No results found for: \"PREGTESTUR\", \"PREGSERUM\", \"HCG\", \"HCGQUANT\"  Pain Management Panel           No data to display                I have personally looked at the labs and they are summarized above.  ----------------------------------------------------------------------------------------------------------------------  Detailed radiology reports for the last 24 hours:    Imaging Results (Last 24 Hours)       ** No results found for the last 24 hours. **              Assessment & Plan      New onset atrial flutter with RVR:  - Continue Cardizem and Lopressor  - Continue heparin GTT until SBO completely resolved and patient has bowel movement then transition to oral Eliquis.  - Echocardiogram shows EF of 51 to 55%    Partial small bowel obstruction:  - CT abdomen pelvis with contrast noted dilated distal small bowel with transition point in the right mid quadrant.  - Avoid NG tube  - N.p.o.  - PPI IV twice daily  - Awaiting bowel movement    Cholelithiasis:  - Cholecystectomy per general surgery tomorrow  - Concerned that cholelithiasis is causing his nausea vomiting and mild abdominal " pain  - Maintain n.p.o. status  - Heparin held    Urinary retention/BPH:  - Guzmán catheter placed on 7/29 overnight  - Had a small amount of hematuria, has since stopped  - Heparin was initially held for this however continuing to hold in anticipation for surgery.  - Will need voiding trial  - Continue tadalafil  - Continue finasteride    ERIC resolved  Prostate cancer      VTE Prophylaxis: SCDs  PPI: Protonix      : Consulted  Physical therapy/Occupational Therapy: Consulted    Disposition: To return home with spouse at discharge    Surya Simmons MD  [facility]  07/29/25  12:33 EDT

## 2025-07-29 NOTE — PLAN OF CARE
Problem: Adult Inpatient Plan of Care  Goal: Plan of Care Review  Outcome: Progressing  Flowsheets (Taken 7/29/2025 0307)  Progress: improving  Plan of Care Reviewed With: patient  Goal: Patient-Specific Goal (Individualized)  Outcome: Progressing  Goal: Absence of Hospital-Acquired Illness or Injury  Outcome: Progressing  Intervention: Identify and Manage Fall Risk  Recent Flowsheet Documentation  Taken 7/29/2025 0300 by Sylvia Pope RN  Safety Promotion/Fall Prevention:   safety round/check completed   room organization consistent   nonskid shoes/slippers when out of bed   fall prevention program maintained   clutter free environment maintained   assistive device/personal items within reach   activity supervised  Taken 7/29/2025 0100 by Sylvia Pope RN  Safety Promotion/Fall Prevention:   safety round/check completed   room organization consistent   nonskid shoes/slippers when out of bed   fall prevention program maintained   clutter free environment maintained   assistive device/personal items within reach   activity supervised  Taken 7/28/2025 2300 by Sylvia Pope RN  Safety Promotion/Fall Prevention:   safety round/check completed   room organization consistent   nonskid shoes/slippers when out of bed   fall prevention program maintained   clutter free environment maintained   assistive device/personal items within reach   activity supervised  Taken 7/28/2025 2100 by Sylvia Pope RN  Safety Promotion/Fall Prevention:   safety round/check completed   room organization consistent   nonskid shoes/slippers when out of bed   fall prevention program maintained   clutter free environment maintained   assistive device/personal items within reach   activity supervised  Taken 7/28/2025 1900 by Sylvia Pope, RN  Safety Promotion/Fall Prevention:   safety round/check completed   room organization consistent   nonskid shoes/slippers when out of bed   fall prevention program maintained   clutter free  environment maintained   assistive device/personal items within reach   activity supervised  Intervention: Prevent Skin Injury  Recent Flowsheet Documentation  Taken 7/29/2025 0200 by Sylvia Pope RN  Skin Protection:   transparent dressing maintained   incontinence pads utilized  Taken 7/28/2025 2000 by Sylvia Pope RN  Skin Protection:   transparent dressing maintained   incontinence pads utilized  Intervention: Prevent and Manage VTE (Venous Thromboembolism) Risk  Recent Flowsheet Documentation  Taken 7/29/2025 0200 by Sylvia Pope RN  VTE Prevention/Management: (heparin gtt) other (see comments)  Taken 7/28/2025 2000 by Sylvia Pope RN  VTE Prevention/Management: (heparin gtt) other (see comments)  Intervention: Prevent Infection  Recent Flowsheet Documentation  Taken 7/29/2025 0300 by Sylvia Pope RN  Infection Prevention:   rest/sleep promoted   single patient room provided  Taken 7/29/2025 0100 by Sylvia Pope RN  Infection Prevention:   rest/sleep promoted   single patient room provided  Taken 7/28/2025 2300 by Sylvia Pope RN  Infection Prevention:   rest/sleep promoted   single patient room provided  Taken 7/28/2025 2100 by Sylvia Pope RN  Infection Prevention:   rest/sleep promoted   single patient room provided  Taken 7/28/2025 1900 by Sylvia Pope RN  Infection Prevention:   rest/sleep promoted   single patient room provided  Goal: Optimal Comfort and Wellbeing  Outcome: Progressing  Intervention: Provide Person-Centered Care  Recent Flowsheet Documentation  Taken 7/29/2025 0200 by Sylvia Pope RN  Trust Relationship/Rapport:   care explained   choices provided   emotional support provided   empathic listening provided   questions answered   questions encouraged   reassurance provided   thoughts/feelings acknowledged  Taken 7/28/2025 2000 by Sylvia Pope RN  Trust Relationship/Rapport:   choices provided   care explained   emotional support provided    empathic listening provided   questions encouraged   questions answered   reassurance provided   thoughts/feelings acknowledged  Goal: Readiness for Transition of Care  Outcome: Progressing     Problem: Fall Injury Risk  Goal: Absence of Fall and Fall-Related Injury  Outcome: Progressing  Intervention: Identify and Manage Contributors  Recent Flowsheet Documentation  Taken 7/29/2025 0300 by Sylvia Pope RN  Medication Review/Management: medications reviewed  Taken 7/29/2025 0200 by Sylvia Pope RN  Self-Care Promotion: independence encouraged  Taken 7/29/2025 0100 by Sylvia Pope RN  Medication Review/Management: medications reviewed  Taken 7/28/2025 2300 by ySlvia Pope RN  Medication Review/Management: medications reviewed  Taken 7/28/2025 2100 by Sylvia Poep RN  Medication Review/Management: medications reviewed  Taken 7/28/2025 2000 by Sylvia Pope RN  Self-Care Promotion: independence encouraged  Taken 7/28/2025 1900 by Sylvia Pope RN  Medication Review/Management: medications reviewed  Intervention: Promote Injury-Free Environment  Recent Flowsheet Documentation  Taken 7/29/2025 0300 by Sylvia Pope RN  Safety Promotion/Fall Prevention:   safety round/check completed   room organization consistent   nonskid shoes/slippers when out of bed   fall prevention program maintained   clutter free environment maintained   assistive device/personal items within reach   activity supervised  Taken 7/29/2025 0100 by Sylvia Pope RN  Safety Promotion/Fall Prevention:   safety round/check completed   room organization consistent   nonskid shoes/slippers when out of bed   fall prevention program maintained   clutter free environment maintained   assistive device/personal items within reach   activity supervised  Taken 7/28/2025 2300 by Sylvia Pope RN  Safety Promotion/Fall Prevention:   safety round/check completed   room organization consistent   nonskid shoes/slippers when out of  bed   fall prevention program maintained   clutter free environment maintained   assistive device/personal items within reach   activity supervised  Taken 7/28/2025 2100 by Sylvia Pope RN  Safety Promotion/Fall Prevention:   safety round/check completed   room organization consistent   nonskid shoes/slippers when out of bed   fall prevention program maintained   clutter free environment maintained   assistive device/personal items within reach   activity supervised  Taken 7/28/2025 1900 by Sylvia Pope, RN  Safety Promotion/Fall Prevention:   safety round/check completed   room organization consistent   nonskid shoes/slippers when out of bed   fall prevention program maintained   clutter free environment maintained   assistive device/personal items within reach   activity supervised   Goal Outcome Evaluation:  Plan of Care Reviewed With: patient        Progress: improving

## 2025-07-29 NOTE — PLAN OF CARE
Problem: Adult Inpatient Plan of Care  Goal: Plan of Care Review  7/29/2025 1656 by Christiana Simmons RN  Outcome: Progressing  Flowsheets  Taken 7/29/2025 1656  Progress: no change  Outcome Evaluation: Pt is alert and oriented. VSS, on 2LNC. Aflutter. Guzmán in place. NPO at midnight. Care ongoing.  Taken 7/29/2025 1559  Plan of Care Reviewed With: patient  7/29/2025 1603 by Christiana Simmons RN  Outcome: Progressing  Flowsheets (Taken 7/29/2025 1559)  Progress: no change  Outcome Evaluation: Pt is alert and oriented. VSS, on 2LNC. Aflutter. Guzmán in place. Care ongoing.  Plan of Care Reviewed With: patient     Problem: Adult Inpatient Plan of Care  Goal: Patient-Specific Goal (Individualized)  7/29/2025 1656 by Christiana Simmons RN  Outcome: Progressing  7/29/2025 1603 by Christiana Simmons RN  Outcome: Progressing     Problem: Adult Inpatient Plan of Care  Goal: Optimal Comfort and Wellbeing  Intervention: Monitor Pain and Promote Comfort  Recent Flowsheet Documentation  Taken 7/29/2025 0827 by Christiana Simmons RN  Pain Management Interventions: pain management plan reviewed with patient/caregiver     Problem: Adult Inpatient Plan of Care  Goal: Optimal Comfort and Wellbeing  Intervention: Provide Person-Centered Care  Recent Flowsheet Documentation  Taken 7/29/2025 1315 by Christiana Simmons RN  Trust Relationship/Rapport:   care explained   choices provided   thoughts/feelings acknowledged  Taken 7/29/2025 0827 by Christiana Simmons RN  Trust Relationship/Rapport:   care explained   choices provided   thoughts/feelings acknowledged   Goal Outcome Evaluation:  Plan of Care Reviewed With: patient        Progress: no change  Outcome Evaluation: Pt is alert and oriented. VSS, on 2LNC. Aflutter. Guzmán in place. NPO at midnight. Care ongoing.

## 2025-07-29 NOTE — CONSULTS
Consulting physician:  Dr. Duggan    Referring physician: Hospitalist    Date of consultation: 07/29/25     Chief complaint possible bowel obstruction on CT    Subjective     Patient is a 79 y.o. male who admitted on 7/25/2025 with elevated heart rate and constipation.  Patient was found to be in A-fib with RVR.  He currently is in sinus rhythm and his heparin drip is being held.  Patient had a CT of the abdomen and pelvis to further evaluate his abdominal pain.  This demonstrated cholelithiasis as well as some air-fluid levels suggestive of a possible partial small bowel obstruction.  Patient states he has had nausea since eating for a few months.  He denies any particular trigger foods.  Thyroid function tests have been normal.  Patient states he has been passing gas all along.      Review of Systems  Review of Systems - General ROS: negative for - weight loss  Psychological ROS: negative for - behavioral disorder  Ophthalmic ROS: negative for - dry eyes  ENT ROS: negative for - vertigo or vocal changes  Hematological and Lymphatic ROS: negative for - swollen lymph nodes, DVT, PE.   Respiratory ROS: negative for - sputum changes or stridor.  Cardiovascular ROS: Positive for - irregular heartbeat or murmur  Gastrointestinal ROS: negative for - blood in stools or change in stools  Genitourinary ROS: negative for - hematuria or incontinence  Musculoskeletal ROS: negative for - gait disturbance      History  Past Medical History:   Diagnosis Date    BPH with elevated PSA 6/9/2023    Elevated PSA     Hypertension      Past Surgical History:   Procedure Laterality Date    APPENDECTOMY      SKIN GRAFT       Family History   Problem Relation Age of Onset    No Known Problems Father     Cancer Mother      Social History     Tobacco Use    Smoking status: Never    Smokeless tobacco: Never   Vaping Use    Vaping status: Never Used   Substance Use Topics    Alcohol use: Never    Drug use: Never     Medications Prior to  Admission   Medication Sig Dispense Refill Last Dose/Taking    dilTIAZem CD (CARDIZEM CD) 180 MG 24 hr capsule Take 1 capsule by mouth Every Night.   7/24/2025 Bedtime    losartan (COZAAR) 100 MG tablet Take 1 tablet by mouth Daily.   7/25/2025 Morning    tadalafil (CIALIS) 5 MG tablet Take 1 tablet by mouth Every Night.   7/24/2025 Bedtime    ondansetron (ZOFRAN) 4 MG tablet Take 1 tablet by mouth Every 8 (Eight) Hours As Needed for Nausea or Vomiting.        Allergies:  Doxazosin and Flomax [tamsulosin]    Objective     Vital Signs  Temp:  [98.1 °F (36.7 °C)-98.7 °F (37.1 °C)] 98.1 °F (36.7 °C)  Heart Rate:  [] 75  Resp:  [13-24] 23  BP: ()/() 156/98    Physical Exam:  General:  This is a WD WN male in no acute distress  Vital signs: Stable, afebrile  HEENT exam:  WNL. Sclerae are anicteric.  EOMI  Neck:  Supple, FROM.  No JVD.  Trachea midline  Lungs:  Respiratory effort normal. Auscultation: Clear, without wheezes, rhonchi, rales  Heart:  Regular rate and rhythm, without murmur, gallop, rub.  No pedal edema  Abdomen: Bowel sounds are present.  There is very mild tenderness in the right upper quadrant.  No other palpable tenderness.  No palpable mass.  No Zambrano sign.  Musculoskeletal:  Muscle strength/tone is normal.    Psych:  Alert, oriented x 3.  Mood and affect are appropriate  Skin:  Warm with good turgor.  Without rash or lesion  Extremities:  Examination of the extremities revealed no cyanosis, clubbing or edema.    Results Review:   Results from last 7 days   Lab Units 07/25/25  1421 07/25/25  1235   CK TOTAL U/L 134  --    HSTROP T ng/L 17 22*     Results from last 7 days   Lab Units 07/29/25  1154 07/29/25  0138 07/28/25  0312 07/27/25  0630 07/25/25  2315 07/25/25  1235   LACTATE mmol/L  --   --   --   --   --  1.4   WBC 10*3/mm3  --  12.86* 8.98 8.84 9.13 9.64   HEMOGLOBIN g/dL 14.6 15.0 13.0 13.4 13.4 14.5   HEMATOCRIT % 44.2 47.5 40.5 40.0 43.3 44.8   PLATELETS 10*3/mm3  --  288  "214 224 224 297   INR   --   --   --  1.11* 1.12* 1.05         Results from last 7 days   Lab Units 07/29/25  0138 07/28/25  0312 07/27/25  0630 07/25/25  2315 07/25/25  1235   SODIUM mmol/L 136 137 138 137 136   POTASSIUM mmol/L 4.1 3.6 3.7 4.2 4.3   MAGNESIUM mg/dL  --  1.9  --  2.1 2.1   CHLORIDE mmol/L 101 105 106 108* 102   CO2 mmol/L 20.9* 21.5* 21.2* 17.2* 20.2*   BUN mg/dL 9.9 7.6* 11.5 20.2 25.7*   CREATININE mg/dL 1.25 1.07 1.00 1.09 1.48*   CALCIUM mg/dL 8.8 7.7* 7.9* 7.7* 8.9   GLUCOSE mg/dL 136* 93 89 92 112*   ALBUMIN g/dL  --   --  3.2* 3.2* 4.2   BILIRUBIN mg/dL  --   --  0.3 0.3 0.9   ALK PHOS U/L  --   --  71 68 87   AST (SGOT) U/L  --   --  22 18 20   ALT (SGPT) U/L  --   --  8 8 10   Estimated Creatinine Clearance: 51.8 mL/min (by C-G formula based on SCr of 1.25 mg/dL).  No results found for: \"AMMONIA\"  Results from last 7 days   Lab Units 07/25/25  1421   CHOLESTEROL mg/dL 125   TRIGLYCERIDES mg/dL 102   HDL CHOL mg/dL 38*   LDL CHOL mg/dL 68     No results found for: \"BLOODCX\"  No results found for: \"URINECX\"  No results found for: \"WOUNDCX\"  No results found for: \"STOOLCX\"    Imaging:  Imaging Results (Last 24 Hours)       Procedure Component Value Units Date/Time    XR Abdomen KUB [[id number]] Collected: 07/29/25 1432     Updated: 07/29/25 1435    Narrative:      EXAMINATION: XR ABDOMEN KUB-      CLINICAL INDICATION: N/V partial SBO; I48.91-Unspecified atrial  fibrillation; K56.690-Other partial intestinal obstruction        COMPARISON: None immediately available     FINDINGS:  Single view of the abdomen     There are distended loops of air-filled small bowel. There is gas  distally as well. This may represent an enteritis or partial small bowel  obstruction.       Impression:      Air-filled loops of small bowel with some dilatation.  Certainly may represent partial small bowel obstruction.        This report was finalized on 7/29/2025 2:33 PM by Dr. Dante De La Paz MD.               CT " abdomen and pelvis report and images were reviewed.  CT demonstrates low index of suspicion for bowel obstruction.  The stomach is not distended.  There is some mildly prominent small bowel loops but none that are significantly dilated.  There is air and stool in the colon with no clear distal transition point.  Numerous gallstones are identified without cholecystitis      Impression:    Atrial fibrillation with RVR    Gallstones.  Feel that patient's symptoms are much more compatible with biliary colic and then small bowel etiology.       Plan:  Proceed with laparoscopic cholecystectomy      Discussion:  The risks of the surgical procedure were discussed.  Options of alternative treatments including no treatment (if applicable) were discussed.  Patient voiced understanding of the above issues and wishes to proceed    Mirta Duggan MD  07/29/25  16:15 EDT    Time: Time spent:    Please note that portions of this note were completed with a voice recognition program.

## 2025-07-30 ENCOUNTER — ANESTHESIA (OUTPATIENT)
Dept: PERIOP | Facility: HOSPITAL | Age: 80
End: 2025-07-30
Payer: MEDICARE

## 2025-07-30 ENCOUNTER — ANESTHESIA EVENT (OUTPATIENT)
Dept: PERIOP | Facility: HOSPITAL | Age: 80
End: 2025-07-30
Payer: MEDICARE

## 2025-07-30 ENCOUNTER — APPOINTMENT (OUTPATIENT)
Dept: GENERAL RADIOLOGY | Facility: HOSPITAL | Age: 80
End: 2025-07-30
Payer: MEDICARE

## 2025-07-30 LAB
ANION GAP SERPL CALCULATED.3IONS-SCNC: 9.8 MMOL/L (ref 5–15)
BASOPHILS # BLD AUTO: 0.05 10*3/MM3 (ref 0–0.2)
BASOPHILS NFR BLD AUTO: 0.4 % (ref 0–1.5)
BUN SERPL-MCNC: 10.7 MG/DL (ref 8–23)
BUN/CREAT SERPL: 10.6 (ref 7–25)
CALCIUM SPEC-SCNC: 7.8 MG/DL (ref 8.6–10.5)
CHLORIDE SERPL-SCNC: 106 MMOL/L (ref 98–107)
CO2 SERPL-SCNC: 22.2 MMOL/L (ref 22–29)
CREAT SERPL-MCNC: 1.01 MG/DL (ref 0.76–1.27)
DEPRECATED RDW RBC AUTO: 48.3 FL (ref 37–54)
EGFRCR SERPLBLD CKD-EPI 2021: 75.2 ML/MIN/1.73
EOSINOPHIL # BLD AUTO: 0.22 10*3/MM3 (ref 0–0.4)
EOSINOPHIL NFR BLD AUTO: 1.7 % (ref 0.3–6.2)
ERYTHROCYTE [DISTWIDTH] IN BLOOD BY AUTOMATED COUNT: 14.3 % (ref 12.3–15.4)
GLUCOSE SERPL-MCNC: 105 MG/DL (ref 65–99)
HCT VFR BLD AUTO: 41.8 % (ref 37.5–51)
HGB BLD-MCNC: 13.6 G/DL (ref 13–17.7)
IMM GRANULOCYTES # BLD AUTO: 0.14 10*3/MM3 (ref 0–0.05)
IMM GRANULOCYTES NFR BLD AUTO: 1.1 % (ref 0–0.5)
LYMPHOCYTES # BLD AUTO: 1.54 10*3/MM3 (ref 0.7–3.1)
LYMPHOCYTES NFR BLD AUTO: 12.2 % (ref 19.6–45.3)
MCH RBC QN AUTO: 30 PG (ref 26.6–33)
MCHC RBC AUTO-ENTMCNC: 32.5 G/DL (ref 31.5–35.7)
MCV RBC AUTO: 92.3 FL (ref 79–97)
MONOCYTES # BLD AUTO: 1.31 10*3/MM3 (ref 0.1–0.9)
MONOCYTES NFR BLD AUTO: 10.3 % (ref 5–12)
NEUTROPHILS NFR BLD AUTO: 74.3 % (ref 42.7–76)
NEUTROPHILS NFR BLD AUTO: 9.41 10*3/MM3 (ref 1.7–7)
NRBC BLD AUTO-RTO: 0 /100 WBC (ref 0–0.2)
PLATELET # BLD AUTO: 260 10*3/MM3 (ref 140–450)
PMV BLD AUTO: 9.9 FL (ref 6–12)
POTASSIUM SERPL-SCNC: 4.1 MMOL/L (ref 3.5–5.2)
RBC # BLD AUTO: 4.53 10*6/MM3 (ref 4.14–5.8)
SODIUM SERPL-SCNC: 138 MMOL/L (ref 136–145)
WBC NRBC COR # BLD AUTO: 12.67 10*3/MM3 (ref 3.4–10.8)

## 2025-07-30 PROCEDURE — 93005 ELECTROCARDIOGRAM TRACING: CPT

## 2025-07-30 PROCEDURE — 25810000003 SODIUM CHLORIDE PER 500 ML: Performed by: SURGERY

## 2025-07-30 PROCEDURE — 93010 ELECTROCARDIOGRAM REPORT: CPT | Performed by: INTERNAL MEDICINE

## 2025-07-30 PROCEDURE — 25010000002 BUPRENORPHINE PER 0.1 MG: Performed by: NURSE ANESTHETIST, CERTIFIED REGISTERED

## 2025-07-30 PROCEDURE — 0FT44ZZ RESECTION OF GALLBLADDER, PERCUTANEOUS ENDOSCOPIC APPROACH: ICD-10-PCS | Performed by: SURGERY

## 2025-07-30 PROCEDURE — 25010000002 LIDOCAINE PF 2% 2 % SOLUTION: Performed by: NURSE ANESTHETIST, CERTIFIED REGISTERED

## 2025-07-30 PROCEDURE — 25010000002 GLYCOPYRROLATE 0.4 MG/2ML SOLUTION: Performed by: NURSE ANESTHETIST, CERTIFIED REGISTERED

## 2025-07-30 PROCEDURE — 25010000002 DEXAMETHASONE PER 1 MG: Performed by: NURSE ANESTHETIST, CERTIFIED REGISTERED

## 2025-07-30 PROCEDURE — 25010000002 PROPOFOL 200 MG/20ML EMULSION: Performed by: NURSE ANESTHETIST, CERTIFIED REGISTERED

## 2025-07-30 PROCEDURE — 71045 X-RAY EXAM CHEST 1 VIEW: CPT | Performed by: RADIOLOGY

## 2025-07-30 PROCEDURE — 25810000003 SODIUM CHLORIDE 0.9 % SOLUTION

## 2025-07-30 PROCEDURE — 25010000002 NEOSTIGMINE 10 MG/10ML SOLUTION: Performed by: NURSE ANESTHETIST, CERTIFIED REGISTERED

## 2025-07-30 PROCEDURE — 80048 BASIC METABOLIC PNL TOTAL CA: CPT | Performed by: STUDENT IN AN ORGANIZED HEALTH CARE EDUCATION/TRAINING PROGRAM

## 2025-07-30 PROCEDURE — 25010000002 CEFAZOLIN PER 500 MG: Performed by: SURGERY

## 2025-07-30 PROCEDURE — 25810000003 LACTATED RINGERS PER 1000 ML: Performed by: NURSE ANESTHETIST, CERTIFIED REGISTERED

## 2025-07-30 PROCEDURE — 71045 X-RAY EXAM CHEST 1 VIEW: CPT

## 2025-07-30 PROCEDURE — 25010000002 FENTANYL CITRATE (PF) 50 MCG/ML SOLUTION: Performed by: NURSE ANESTHETIST, CERTIFIED REGISTERED

## 2025-07-30 PROCEDURE — 94640 AIRWAY INHALATION TREATMENT: CPT

## 2025-07-30 PROCEDURE — 99232 SBSQ HOSP IP/OBS MODERATE 35: CPT

## 2025-07-30 PROCEDURE — 25010000002 FAMOTIDINE (PF) 20 MG/2ML SOLUTION: Performed by: NURSE ANESTHETIST, CERTIFIED REGISTERED

## 2025-07-30 PROCEDURE — 25010000002 ROPIVACAINE PER 1 MG: Performed by: NURSE ANESTHETIST, CERTIFIED REGISTERED

## 2025-07-30 PROCEDURE — 25010000002 KETOROLAC TROMETHAMINE PER 15 MG: Performed by: NURSE ANESTHETIST, CERTIFIED REGISTERED

## 2025-07-30 PROCEDURE — 85025 COMPLETE CBC W/AUTO DIFF WBC: CPT | Performed by: STUDENT IN AN ORGANIZED HEALTH CARE EDUCATION/TRAINING PROGRAM

## 2025-07-30 PROCEDURE — 47562 LAPAROSCOPIC CHOLECYSTECTOMY: CPT | Performed by: SURGERY

## 2025-07-30 DEVICE — CLIP APPLIER
Type: IMPLANTABLE DEVICE | Site: ABDOMEN | Status: FUNCTIONAL
Brand: ENDO CLIP

## 2025-07-30 RX ORDER — SODIUM CHLORIDE, SODIUM LACTATE, POTASSIUM CHLORIDE, CALCIUM CHLORIDE 600; 310; 30; 20 MG/100ML; MG/100ML; MG/100ML; MG/100ML
100 INJECTION, SOLUTION INTRAVENOUS ONCE AS NEEDED
Status: DISCONTINUED | OUTPATIENT
Start: 2025-07-30 | End: 2025-07-30 | Stop reason: HOSPADM

## 2025-07-30 RX ORDER — BUPRENORPHINE HYDROCHLORIDE 0.32 MG/ML
INJECTION INTRAMUSCULAR; INTRAVENOUS
Status: COMPLETED | OUTPATIENT
Start: 2025-07-30 | End: 2025-07-30

## 2025-07-30 RX ORDER — FENTANYL CITRATE 50 UG/ML
50 INJECTION, SOLUTION INTRAMUSCULAR; INTRAVENOUS
Status: DISCONTINUED | OUTPATIENT
Start: 2025-07-30 | End: 2025-07-30 | Stop reason: HOSPADM

## 2025-07-30 RX ORDER — MEPERIDINE HYDROCHLORIDE 25 MG/ML
12.5 INJECTION INTRAMUSCULAR; INTRAVENOUS; SUBCUTANEOUS
Status: DISCONTINUED | OUTPATIENT
Start: 2025-07-30 | End: 2025-07-30 | Stop reason: HOSPADM

## 2025-07-30 RX ORDER — ONDANSETRON 2 MG/ML
4 INJECTION INTRAMUSCULAR; INTRAVENOUS AS NEEDED
Status: DISCONTINUED | OUTPATIENT
Start: 2025-07-30 | End: 2025-07-30 | Stop reason: HOSPADM

## 2025-07-30 RX ORDER — KETOROLAC TROMETHAMINE 30 MG/ML
15 INJECTION, SOLUTION INTRAMUSCULAR; INTRAVENOUS EVERY 6 HOURS PRN
Status: COMPLETED | OUTPATIENT
Start: 2025-07-30 | End: 2025-07-30

## 2025-07-30 RX ORDER — OXYCODONE AND ACETAMINOPHEN 5; 325 MG/1; MG/1
1 TABLET ORAL ONCE AS NEEDED
Status: COMPLETED | OUTPATIENT
Start: 2025-07-30 | End: 2025-07-30

## 2025-07-30 RX ORDER — FAMOTIDINE 10 MG/ML
INJECTION, SOLUTION INTRAVENOUS AS NEEDED
Status: DISCONTINUED | OUTPATIENT
Start: 2025-07-30 | End: 2025-07-30 | Stop reason: SURG

## 2025-07-30 RX ORDER — DEXAMETHASONE SODIUM PHOSPHATE 4 MG/ML
INJECTION, SOLUTION INTRA-ARTICULAR; INTRALESIONAL; INTRAMUSCULAR; INTRAVENOUS; SOFT TISSUE
Status: COMPLETED | OUTPATIENT
Start: 2025-07-30 | End: 2025-07-30

## 2025-07-30 RX ORDER — IPRATROPIUM BROMIDE AND ALBUTEROL SULFATE 2.5; .5 MG/3ML; MG/3ML
3 SOLUTION RESPIRATORY (INHALATION) ONCE AS NEEDED
Status: COMPLETED | OUTPATIENT
Start: 2025-07-30 | End: 2025-07-30

## 2025-07-30 RX ORDER — FENTANYL CITRATE 50 UG/ML
INJECTION, SOLUTION INTRAMUSCULAR; INTRAVENOUS AS NEEDED
Status: DISCONTINUED | OUTPATIENT
Start: 2025-07-30 | End: 2025-07-30 | Stop reason: SURG

## 2025-07-30 RX ORDER — ROPIVACAINE HYDROCHLORIDE 5 MG/ML
INJECTION, SOLUTION EPIDURAL; INFILTRATION; PERINEURAL
Status: COMPLETED | OUTPATIENT
Start: 2025-07-30 | End: 2025-07-30

## 2025-07-30 RX ORDER — PROPOFOL 10 MG/ML
INJECTION, EMULSION INTRAVENOUS AS NEEDED
Status: DISCONTINUED | OUTPATIENT
Start: 2025-07-30 | End: 2025-07-30 | Stop reason: SURG

## 2025-07-30 RX ORDER — NEOSTIGMINE METHYLSULFATE 1 MG/ML
INJECTION INTRAVENOUS AS NEEDED
Status: DISCONTINUED | OUTPATIENT
Start: 2025-07-30 | End: 2025-07-30 | Stop reason: SURG

## 2025-07-30 RX ORDER — SODIUM CHLORIDE 9 MG/ML
INJECTION, SOLUTION INTRAVENOUS AS NEEDED
Status: DISCONTINUED | OUTPATIENT
Start: 2025-07-30 | End: 2025-07-30 | Stop reason: HOSPADM

## 2025-07-30 RX ORDER — PHENYLEPHRINE HCL IN 0.9% NACL 1 MG/10 ML
SYRINGE (ML) INTRAVENOUS AS NEEDED
Status: DISCONTINUED | OUTPATIENT
Start: 2025-07-30 | End: 2025-07-30 | Stop reason: SURG

## 2025-07-30 RX ORDER — SODIUM CHLORIDE, SODIUM LACTATE, POTASSIUM CHLORIDE, CALCIUM CHLORIDE 600; 310; 30; 20 MG/100ML; MG/100ML; MG/100ML; MG/100ML
INJECTION, SOLUTION INTRAVENOUS CONTINUOUS PRN
Status: DISCONTINUED | OUTPATIENT
Start: 2025-07-30 | End: 2025-07-30 | Stop reason: SURG

## 2025-07-30 RX ORDER — GLYCOPYRROLATE 0.2 MG/ML
INJECTION INTRAMUSCULAR; INTRAVENOUS AS NEEDED
Status: DISCONTINUED | OUTPATIENT
Start: 2025-07-30 | End: 2025-07-30 | Stop reason: SURG

## 2025-07-30 RX ORDER — LIDOCAINE HYDROCHLORIDE 20 MG/ML
INJECTION, SOLUTION EPIDURAL; INFILTRATION; INTRACAUDAL; PERINEURAL AS NEEDED
Status: DISCONTINUED | OUTPATIENT
Start: 2025-07-30 | End: 2025-07-30 | Stop reason: SURG

## 2025-07-30 RX ORDER — EPHEDRINE SULFATE/0.9% NACL/PF 25 MG/5 ML
SYRINGE (ML) INTRAVENOUS AS NEEDED
Status: DISCONTINUED | OUTPATIENT
Start: 2025-07-30 | End: 2025-07-30 | Stop reason: SURG

## 2025-07-30 RX ORDER — ROCURONIUM BROMIDE 10 MG/ML
INJECTION, SOLUTION INTRAVENOUS AS NEEDED
Status: DISCONTINUED | OUTPATIENT
Start: 2025-07-30 | End: 2025-07-30 | Stop reason: SURG

## 2025-07-30 RX ADMIN — Medication 100 MCG: at 10:28

## 2025-07-30 RX ADMIN — EPHEDRINE SULFATE 10 MG: 5 INJECTION INTRAVENOUS at 10:56

## 2025-07-30 RX ADMIN — ROCURONIUM BROMIDE 30 MG: 10 INJECTION INTRAVENOUS at 10:19

## 2025-07-30 RX ADMIN — METOPROLOL TARTRATE 25 MG: 25 TABLET, FILM COATED ORAL at 20:41

## 2025-07-30 RX ADMIN — Medication 100 MCG: at 10:45

## 2025-07-30 RX ADMIN — Medication 100 MCG: at 10:34

## 2025-07-30 RX ADMIN — Medication 100 MCG: at 10:48

## 2025-07-30 RX ADMIN — Medication 100 MCG: at 10:39

## 2025-07-30 RX ADMIN — FAMOTIDINE 20 MG: 10 INJECTION, SOLUTION INTRAVENOUS at 10:15

## 2025-07-30 RX ADMIN — Medication 10 ML: at 08:18

## 2025-07-30 RX ADMIN — FINASTERIDE 5 MG: 5 TABLET, FILM COATED ORAL at 08:16

## 2025-07-30 RX ADMIN — GLYCOPYRROLATE 0.8 MG: 0.2 INJECTION INTRAMUSCULAR; INTRAVENOUS at 11:30

## 2025-07-30 RX ADMIN — METOPROLOL TARTRATE 25 MG: 25 TABLET, FILM COATED ORAL at 08:16

## 2025-07-30 RX ADMIN — ROCURONIUM BROMIDE 10 MG: 10 INJECTION INTRAVENOUS at 11:09

## 2025-07-30 RX ADMIN — Medication 100 MCG: at 11:00

## 2025-07-30 RX ADMIN — Medication 100 MCG: at 10:53

## 2025-07-30 RX ADMIN — FENTANYL CITRATE 100 MCG: 50 INJECTION, SOLUTION INTRAMUSCULAR; INTRAVENOUS at 10:19

## 2025-07-30 RX ADMIN — Medication 100 MCG: at 10:32

## 2025-07-30 RX ADMIN — EPHEDRINE SULFATE 15 MG: 5 INJECTION INTRAVENOUS at 10:44

## 2025-07-30 RX ADMIN — PANTOPRAZOLE SODIUM 40 MG: 40 INJECTION, POWDER, FOR SOLUTION INTRAVENOUS at 16:36

## 2025-07-30 RX ADMIN — Medication 100 MCG: at 10:57

## 2025-07-30 RX ADMIN — ROPIVACAINE HYDROCHLORIDE 250 MG: 5 INJECTION, SOLUTION EPIDURAL; INFILTRATION; PERINEURAL at 10:29

## 2025-07-30 RX ADMIN — KETOROLAC TROMETHAMINE 15 MG: 30 INJECTION INTRAMUSCULAR; INTRAVENOUS at 12:16

## 2025-07-30 RX ADMIN — Medication 100 MCG: at 10:36

## 2025-07-30 RX ADMIN — PANTOPRAZOLE SODIUM 40 MG: 40 INJECTION, POWDER, FOR SOLUTION INTRAVENOUS at 08:17

## 2025-07-30 RX ADMIN — IPRATROPIUM BROMIDE AND ALBUTEROL SULFATE 3 ML: .5; 3 SOLUTION RESPIRATORY (INHALATION) at 12:30

## 2025-07-30 RX ADMIN — PROPOFOL 150 MG: 10 INJECTION, EMULSION INTRAVENOUS at 10:19

## 2025-07-30 RX ADMIN — CEFAZOLIN 2000 MG: 2 INJECTION, POWDER, FOR SOLUTION INTRAMUSCULAR; INTRAVENOUS at 10:15

## 2025-07-30 RX ADMIN — Medication 100 MCG: at 10:30

## 2025-07-30 RX ADMIN — Medication 100 MCG: at 10:42

## 2025-07-30 RX ADMIN — DILTIAZEM HYDROCHLORIDE 240 MG: 120 CAPSULE, EXTENDED RELEASE ORAL at 08:16

## 2025-07-30 RX ADMIN — BUPRENORPHINE HYDROCHLORIDE 0.3 MG: 0.32 INJECTION INTRAMUSCULAR; INTRAVENOUS at 10:29

## 2025-07-30 RX ADMIN — TADALAFIL 5 MG: 5 TABLET ORAL at 20:41

## 2025-07-30 RX ADMIN — NEOSTIGMINE METHYLSULFATE 5 MG: 1 INJECTION INTRAVENOUS at 11:30

## 2025-07-30 RX ADMIN — LIDOCAINE HYDROCHLORIDE 60 MG: 20 INJECTION, SOLUTION EPIDURAL; INFILTRATION; INTRACAUDAL; PERINEURAL at 10:19

## 2025-07-30 RX ADMIN — SODIUM CHLORIDE, POTASSIUM CHLORIDE, SODIUM LACTATE AND CALCIUM CHLORIDE: 600; 310; 30; 20 INJECTION, SOLUTION INTRAVENOUS at 10:15

## 2025-07-30 RX ADMIN — Medication 10 ML: at 20:42

## 2025-07-30 RX ADMIN — SODIUM CHLORIDE 75 ML/HR: 9 INJECTION, SOLUTION INTRAVENOUS at 01:12

## 2025-07-30 RX ADMIN — OXYCODONE HYDROCHLORIDE AND ACETAMINOPHEN 1 TABLET: 5; 325 TABLET ORAL at 12:15

## 2025-07-30 RX ADMIN — DEXAMETHASONE SODIUM PHOSPHATE 8 MG: 4 INJECTION, SOLUTION INTRA-ARTICULAR; INTRALESIONAL; INTRAMUSCULAR; INTRAVENOUS; SOFT TISSUE at 10:29

## 2025-07-30 NOTE — PLAN OF CARE
Goal Outcome Evaluation:        Problem: Adult Inpatient Plan of Care  Goal: Plan of Care Review  Outcome: Progressing  Flowsheets  Taken 7/30/2025 1737 by Ada Staley RN  Progress: improving  Outcome Evaluation: Pt is alert and oriented, resting in bed. Aflutter 70s. Requiring 4LNC post op. Guzmán removed. Care ongoing.  Taken 7/30/2025 1245 by Beronica Rangel RN  Plan of Care Reviewed With: patient     Problem: Adult Inpatient Plan of Care  Goal: Patient-Specific Goal (Individualized)  Outcome: Progressing        Progress: improving  Outcome Evaluation: Pt is alert and oriented, resting in bed. Aflutter 70s. Requiring 4LNC post op. Guzmán removed. Care ongoing.

## 2025-07-30 NOTE — ANESTHESIA POSTPROCEDURE EVALUATION
Patient: Crow Zeng    Procedure Summary       Date: 07/30/25 Room / Location: [facility] OR 01 /  [facility] OR    Anesthesia Start: 1015 Anesthesia Stop: 1145    Procedure: CHOLECYSTECTOMY LAPAROSCOPIC (Abdomen) Diagnosis:       Gallstones      (Gallstones [K80.20])    Surgeons: Mirta Duggan MD Provider: Efren Johnson DO    Anesthesia Type: general, general with block ASA Status: 3            Anesthesia Type: general, general with block    Vitals  Vitals Value Taken Time   /86 07/30/25 12:49   Temp 97.7 °F (36.5 °C) 07/30/25 11:45   Pulse 68 07/30/25 12:51   Resp 16 07/30/25 12:40   SpO2 91 % 07/30/25 12:51   Vitals shown include unfiled device data.        Post Anesthesia Care and Evaluation    Patient location during evaluation: PHASE II  Patient participation: complete - patient participated  Level of consciousness: awake and alert  Pain score: 1  Pain management: adequate    Airway patency: patent  Anesthetic complications: No anesthetic complications  PONV Status: controlled  Cardiovascular status: acceptable  Respiratory status: acceptable and room air  Hydration status: euvolemic  No anesthesia care post op    
No

## 2025-07-30 NOTE — ANESTHESIA PROCEDURE NOTES
Airway  Reason: elective    Date/Time: 7/30/2025 10:20 AM  Airway not difficult    General Information and Staff    Patient location during procedure: OR  CRNA/CAA: Maia Black CRNA    Indications and Patient Condition  Indications for airway management: airway protection    Preoxygenated: yes  MILS maintained throughout    Mask difficulty assessment: 2 - vent by mask + OA or adjuvant +/- NMBA    Final Airway Details    Final airway type: endotracheal airway      Successful airway: ETT  Cuffed: yes   Successful intubation technique: direct laryngoscopy  Endotracheal tube insertion site: oral  Blade: Chicho  Blade size: 3  ETT size (mm): 7.5  Cormack-Lehane Classification: grade IIa - partial view of glottis  Placement verified by: chest auscultation, capnometry and palpation of cuff   Cuff volume (mL): 6  Measured from: lips  ETT/EBT  to lips (cm): 22  Number of attempts at approach: 1  Assessment: lips, teeth, and gum same as pre-op and atraumatic intubation    Additional Comments  PATIENT HAS A GROWTH NOTED ON HIS HARD PALETTE. MISSING SEVERAL TEETH. GUMS HAVE POOR PERFUSION. DECAY NOTED ON ALL REMAINING TEETH.    Dentition as preop. No complications noted. Patient tolerated well.  ETT secured.

## 2025-07-30 NOTE — PLAN OF CARE
Goal Outcome Evaluation:              Outcome Evaluation: Pt A&Ox4. VSS on 2L NC. Strict NPO since 0000. Pt states no complaints or concerns at this time. Safety measures maintained.

## 2025-07-30 NOTE — PROGRESS NOTES
[facility] HOSPITALIST PROGRESS NOTE     Patient Identification:  Name:  Crow Zeng  Age:  79 y.o.  Sex:  male  :  1945  MRN:  [id number]  Visit Number:  [id number]  ROOM: [place] [address]     Primary Care Provider:  Ruby Choudhary APRN     Date of Admission: 2025    Length of stay in inpatient status:  5    Subjective     Chief Compliant:    Chief Complaint   Patient presents with    Constipation    Rapid Heart Rate     History of Presenting Illness: 79-year-old male with past medical history significant for prostate cancer, BPH, hypertension presented to emergency department  due to constipation and rapid heart rate.    Patient was complaining of abdominal distention and pain in the emergency department patient previously saw his PCP prior to arrival and they recommended him to be evaluated by the emergency department.  Patient stated that he felt unwell beginning on Tuesday had some nausea vomiting on Wednesday and Thursday however felt better on the morning of admission he states he has been passing gas and had liquid bowel movements over the last several days denies any constipation.  On arrival to the emergency department heart rate is 162 noted to be in A-fib with RVR he was hypotensive 88/62 but subsequently improved once heart rate stabilized he received adenosine, diltiazem and amiodarone while in the emergency department and placed on amiodarone drip initial troponin was 20 2 repeat was 17 creatinine was 1.48 hemoglobin was 6.0 CT abdomen pelvis showed fluid-filled distended small bowel with largest area in the left upper lower quadrant caliber transition point within the right middle quadrants resembling early/partial small bowel obstruction.  Patient was admitted to the PCU.      Patient was transition off amiodarone drip to Cardizem and metoprolol.  Patient has been tolerating diet well.    : Continuing current rate control medications, patient is in sinus rhythm heart  rate 66, blood pressure stable.  Patient still has not had bowel movement however is passing gas, patient is tolerating GI diet, low irritant.    7/29: Urinary rentention ON, patient had Guzmán catheter placed, seem to have trauma had hematuria after Guzmán placement, heparin was held, hemoglobin has been stable, patient in normal sinus rhythm, patient also had some leaking around Guzmán catheter, 10 cc more of saline was placed in Guzmán bulb, leaking has stopped.  On evaluation urine was clear and yellow at the top of the Guzmán catheter, and no bleeding noted around urethral meatus.  General surgery was consulted due to nausea and vomiting and inability to tolerate diet, concern for worsening of SBO however general surgery thought it was likely due to his gallstones, planning for cholecystectomy tomorrow.  They recommend continue to hold heparin at this time and maintaining patient n.p.o. until after surgery.    7/30: Patient seen and examined at bedside, patient saturating well on 6 L nasal cannula, patient is postop still mildly drowsy, able to answer all questions, patient states that he had bowel movement this a.m., feels well after surgery.  Patient denies any difficulty breathing at this time however will obtain chest x-ray due to small pleural effusion on previous imaging to ensure it is not progressed.  Patient family member agreeable.  Will discontinue Guzmán at this time and do voiding trial.  Bladder scans every 6 hours, straight cath if greater than 350 postvoid residual volume.  Nursing staff made aware.      Objective     Current Hospital Meds:  dilTIAZem CD, 240 mg, Oral, Q24H  finasteride, 5 mg, Oral, Daily  metoprolol tartrate, 25 mg, Oral, Q12H  pantoprazole, 40 mg, Intravenous, BID AC  sodium chloride, 10 mL, Intravenous, Q12H  tadalafil, 5 mg, Oral, Nightly    Pharmacy Consult,   [Held by provider] Pharmacy to Dose Heparin,       Current Antimicrobial Therapy:  Anti-Infectives (From admission,  onward)      Ordered     Dose/Rate Route Frequency Start Stop    07/30/25 0901  ceFAZolin 2000 mg IVPB in 100 mL NS (VTB)  Status:  Discontinued        Ordering Provider: Mirta Duggan MD    2,000 mg  over 30 Minutes Intravenous On Call to O.R. 07/31/25 0600 07/30/25 1013          Current Diuretic Therapy:  Diuretics (From admission, onward)      None          ----------------------------------------------------------------------------------------------------------------------  Vital Signs:  Temp:  [97.7 °F (36.5 °C)-98.7 °F (37.1 °C)] 97.7 °F (36.5 °C)  Heart Rate:  [64-79] 64  Resp:  [12-28] 16  BP: ()/() 126/88  SpO2:  [90 %-98 %] 94 %  on  Flow (L/min) (Oxygen Therapy):  [2-10] 6;   Device (Oxygen Therapy): nasal cannula  Body mass index is 31.77 kg/m².    Wt Readings from Last 3 Encounters:   07/30/25 97.6 kg (215 lb 2.7 oz)   04/09/25 90.4 kg (199 lb 6.4 oz)   10/09/24 90.3 kg (199 lb)     Intake & Output (last 3 days)         07/27 0701 07/28 0700 07/28 0701 07/29 0700 07/29 0701 07/30 0700 07/30 0701 07/31 0700    P.O. 720 480 0 0    I.V. (mL/kg) 158 (1.8) 143.3 (1.7) 1220.1 (14.2) 750 (8.7)    IV Piggyback    100    Total Intake(mL/kg) 878 (10.2) 623.3 (7.2) 1220.1 (14.2) 850 (9.9)    Urine (mL/kg/hr) 2225 (1.1) 1100 (0.5) 150 (0.1) 60 (0.1)    Emesis/NG output    10    Stool 0  0     Total Output 2225 1100 150 70    Net -1347 -476.7 +1070.1 +780            Urine Unmeasured Occurrence  10 x      Stool Unmeasured Occurrence  0 x 3 x           Diet: Regular/House, Gastrointestinal; Low Irritant; Texture: Soft to Chew (NDD 3); Soft to Chew: Whole Meat; Fluid Consistency: Thin (IDDSI 0)  ----------------------------------------------------------------------------------------------------------------------  Physical Exam  Constitutional:       General: He is not in acute distress.  HENT:      Head: Normocephalic and atraumatic.      Right Ear: External ear normal.      Left Ear: External ear  normal.      Nose: Nose normal.      Mouth/Throat:      Mouth: Mucous membranes are moist.      Pharynx: Oropharynx is clear.   Eyes:      Extraocular Movements: Extraocular movements intact.      Pupils: Pupils are equal, round, and reactive to light.   Cardiovascular:      Rate and Rhythm: Normal rate and regular rhythm.      Pulses: Normal pulses.      Heart sounds: Normal heart sounds.   Pulmonary:      Effort: Pulmonary effort is normal.      Breath sounds: Normal breath sounds.      Comments: Nasal cannula in place, currently on 6 L  Abdominal:      General: Bowel sounds are normal. There is no distension.      Palpations: Abdomen is soft.   Genitourinary:     Penis: Normal.       Comments: Guzmán catheter in place  Musculoskeletal:         General: No swelling.      Right lower leg: No edema.      Left lower leg: No edema.   Skin:     Coloration: Skin is not jaundiced.   Neurological:      Mental Status: He is alert and oriented to person, place, and time. Mental status is at baseline.   Psychiatric:         Mood and Affect: Mood normal.       ----------------------------------------------------------------------------------------------------------------------      LABS:    Pending test results:  Pending Labs       Order Current Status    TISSUE EXAM, [facility] LAB[facility] ([facility]) Collected (07/30/25 1035)            CBC and coagulation:  Results from last 7 days   Lab Units 07/30/25  0048 07/29/25  1154 07/29/25  0138 07/28/25  0312 07/27/25  0630 07/25/25  2315 07/25/25  1235   LACTATE mmol/L  --   --   --   --   --   --  1.4   WBC 10*3/mm3 12.67*  --  12.86* 8.98 8.84 9.13 9.64   HEMOGLOBIN g/dL 13.6 14.6 15.0 13.0 13.4 13.4 14.5   HEMATOCRIT % 41.8 44.2 47.5 40.5 40.0 43.3 44.8   MCV fL 92.3  --  94.8 92.9 90.5 97.7* 94.1   MCHC g/dL 32.5  --  31.6 32.1 33.5 30.9* 32.4   PLATELETS 10*3/mm3 260  --  288 214 224 224 297   INR   --   --   --   --  1.11* 1.12* 1.05     Acid/base balance:          Renal and  electrolytes:  Results from last 7 days   Lab Units 07/30/25  0048 07/29/25  0138 07/28/25 0312 07/27/25 0630 07/25/25 2315 07/25/25  1235   SODIUM mmol/L 138 136 137 138 137 136   POTASSIUM mmol/L 4.1 4.1 3.6 3.7 4.2 4.3   MAGNESIUM mg/dL  --   --  1.9  --  2.1 2.1   CHLORIDE mmol/L 106 101 105 106 108* 102   CO2 mmol/L 22.2 20.9* 21.5* 21.2* 17.2* 20.2*   BUN mg/dL 10.7 9.9 7.6* 11.5 20.2 25.7*   CREATININE mg/dL 1.01 1.25 1.07 1.00 1.09 1.48*   CALCIUM mg/dL 7.8* 8.8 7.7* 7.9* 7.7* 8.9   GLUCOSE mg/dL 105* 136* 93 89 92 112*   ANION GAP mmol/L 9.8 14.1 10.5 10.8 11.8 13.8     Estimated Creatinine Clearance: 67.2 mL/min (by C-G formula based on SCr of 1.01 mg/dL).    Liver and pancreatic function:  Results from last 7 days   Lab Units 07/27/25 0630 07/25/25 2315 07/25/25  1235   ALBUMIN g/dL 3.2* 3.2* 4.2   BILIRUBIN mg/dL 0.3 0.3 0.9   ALK PHOS U/L 71 68 87   AST (SGOT) U/L 22 18 20   ALT (SGPT) U/L 8 8 10         Endocrine function:  Lab Results   Component Value Date    HGBA1C 6.06 (H) 07/25/2025    HGBA1C 6 (H) 06/17/2025    HGBA1C 6.1 (H) 12/17/2024     Point of care bedside glucose levels:      Glucose levels from the [facility]:  Results from last 7 days   Lab Units 07/30/25  0048 07/29/25  0138 07/28/25 0312 07/27/25 0630 07/25/25 2315 07/25/25  1235   GLUCOSE mg/dL 105* 136* 93 89 92 112*     Lab Results   Component Value Date    TSH 1.330 07/25/2025     Cardiac:  Results from last 7 days   Lab Units 07/25/25  1421 07/25/25  1235   CK TOTAL U/L 134  --    HSTROP T ng/L 17 22*     Results from last 7 days   Lab Units 07/25/25  1421   CHOLESTEROL mg/dL 125   TRIGLYCERIDES mg/dL 102   HDL CHOL mg/dL 38*   LDL CHOL mg/dL 68     Cultures:  Lab Results   Component Value Date    COLORU Yellow 07/25/2025    CLARITYU Clear 07/25/2025    SPECGRAV 1.015 01/18/2023    PHUR 5.5 07/25/2025    GLUCOSEU Negative 07/25/2025    KETONESU Trace (A) 07/25/2025    BLOODU Negative 07/25/2025    NITRITEU Negative 07/25/2025     "LEUKOCYTESUR Negative 07/25/2025    BILIRUBINUR Negative 07/25/2025    UROBILINOGEN 0.2 E.U./dL 07/25/2025     Microbiology Results (last 10 days)       ** No results found for the last 240 hours. **            No results found for: \"PREGTESTUR\", \"PREGSERUM\", \"HCG\", \"HCGQUANT\"  Pain Management Panel           No data to display                I have personally looked at the labs and they are summarized above.  ----------------------------------------------------------------------------------------------------------------------  Detailed radiology reports for the last 24 hours:    Imaging Results (Last 24 Hours)       Procedure Component Value Units Date/Time    XR Chest 1 View - In process [[id number]] Resulted: 07/30/25 1336     Updated: 07/30/25 1336    This result has not been signed. Information might be incomplete.      FL joe endo (surgery) [[id number]] Resulted: 07/30/25 1317     Updated: 07/30/25 1317    Narrative:      This procedure was auto-finalized with no dictation required.    XR Abdomen KUB [[id number]] Collected: 07/29/25 1432     Updated: 07/29/25 1435    Narrative:      EXAMINATION: XR ABDOMEN KUB-      CLINICAL INDICATION: N/V partial SBO; I48.91-Unspecified atrial  fibrillation; K56.690-Other partial intestinal obstruction        COMPARISON: None immediately available     FINDINGS:  Single view of the abdomen     There are distended loops of air-filled small bowel. There is gas  distally as well. This may represent an enteritis or partial small bowel  obstruction.       Impression:      Air-filled loops of small bowel with some dilatation.  Certainly may represent partial small bowel obstruction.        This report was finalized on 7/29/2025 2:33 PM by Dr. Dante De La Paz MD.                 Assessment & Plan      New onset atrial flutter with RVR:  - Continue Cardizem and Lopressor  - Continue heparin GTT until SBO completely resolved and patient has bowel movement then transition to oral " Eliquis.  - Echocardiogram shows EF of 51 to 55%    Partial small bowel obstruction:  - CT abdomen pelvis with contrast noted dilated distal small bowel with transition point in the right mid quadrant.  - Avoid NG tube  - N.p.o.  - PPI IV twice daily  - Bowel movement on morning of 7/30    Acute hypoxia likely secondary to anesthesia:  - Wean oxygen as tolerated  - Chest x-ray, follow-up    Cholelithiasis:  - Cholecystectomy per general surgery tomorrow  - Cholelithiasis likely culprit of causing nausea and vomiting  - Maintain n.p.o. status, surgery to advance diet as tolerated  - Heparin held, resume when cleared by surgery    Urinary retention/BPH:  - Guzmán catheter placed on 7/29 overnight  - Had a small amount of hematuria, has since stopped  - Heparin was initially held for this however continuing to hold in anticipation for surgery.  - Will need voiding trial  - Bladder scans every 6 hours  - DC Guzmán  - Continue tadalafil  - Continue finasteride    ERIC resolved  Prostate cancer      VTE Prophylaxis: SCDs  PPI: Protonix      : Consulted  Physical therapy/Occupational Therapy: Consulted    Disposition: To return home with spouse at discharge    Surya Simmons MD  [facility] Hospitalist  07/30/25  13:37 EDT

## 2025-07-30 NOTE — OP NOTE
Laparoscopic Cholecystectomy     Surgeon:  Mirta Duggan M.D., [facility]ON    Assistant:  Birgit    Indications: This patient presents with symptomatic gallbladder disease and will undergo laparoscopic cholecystectomy.    Pre-operative Diagnosis: cholelithiasis    Post-operative Diagnosis: same    Anesthesia: General with block    Procedure Details   After obtaining informed consent and with venous compression boots in place, patient was taken to the operating room and placed in the supine position. After induction of general anesthesia, antibiotic prophylaxis was administered. General endotracheal anesthesia was then administered and  the abdomen was prepped and draped in the usual sterile fashion.     An incision was made above the umbilicus and the Veress needle was inserted. Pneumoperitoneum was obtained to 15mmHg and the trocars were placed.  The camera was inserted, confirming position within the abdomen.  The patient was placed in reverse Trendelenburg and additional trocars were introduced under direct vision.    The gallbladder was identified, the fundus grasped and retracted cephalad. Adhesions were lysed and with the electrocautery where indicated, taking care not to injure any adjacent organs or viscus. The infundibulum was grasped and retracted laterally, exposing the peritoneum overlying the triangle of Calot. This was then divided and exposed in a blunt fashion. The cystic duct and cystic artery were clearly identified and dissected circumferentially.     The cystic duct was clipped proximally and divided.  The cystic artery was identified, dissected free, ligated with clips and divided as well.     The gallbladder was dissected from the liver bed in retrograde fashion with the electrocautery. The gallbladder was removed. The liver bed was irrigated and inspected. Hemostasis was achieved with the electrocautery.     Camera was switched to the subxiphoid position, the gallbladder was placed within the Endo  Catch and brought out through the umbilical port.  The umbilical fascia and subxiphoid fascia were closed.  The remaining trocars were removed and the skin was closed with a 4-0 Vicryl subcuticular stitch and a sterile dressing was applied.    Instrument, sponge, and needle counts were correct at closure and at the conclusion of the case.     Patient tolerated the procedure well, was taken to the recovery room in stable condition    Findings:  Moderate free fluid.  Terminal ileum and cecum identified - SB dilated all the way to cecum (no SBO)    Estimated Blood Loss: Minimal    Blood administered: None           Drains: None    Grafts and Implants: None           Total IV Fluids: Per anesthesia           Specimens: Gallbladder             Complications: None

## 2025-07-30 NOTE — ANESTHESIA PREPROCEDURE EVALUATION
Anesthesia Evaluation     Patient summary reviewed and Nursing notes reviewed   no history of anesthetic complications:   NPO Solid Status: > 8 hours  NPO Liquid Status: > 8 hours           Airway   Mallampati: II  TM distance: >3 FB  Neck ROM: full  No difficulty expected  Dental    (+) poor dentition        Pulmonary - negative pulmonary ROS   (+) ,decreased breath sounds  Cardiovascular     ECG reviewed  Rhythm: irregular  Rate: normal    (+) hypertension, dysrhythmias Atrial Fib, Atrial Flutter, Paroxysmal Atrial Fib, hyperlipidemia      Neuro/Psych- negative ROS  GI/Hepatic/Renal/Endo    (+) obesity, GERD    Musculoskeletal (-) negative ROS    Abdominal   (+) obese   Substance History - negative use     OB/GYN negative ob/gyn ROS         Other      history of cancer remission    ROS/Med Hx Other: Echo 7/26/2025:  ·  Left ventricular systolic function is low normal. Left ventricular ejection fraction appears to be 51 - 55%.  ·  Left ventricular diastolic function was normal.  ·  There is calcification of the aortic valve mainly affecting the non-coronary cusp(s).  ·  Estimated right ventricular systolic pressure from tricuspid regurgitation is normal (<35 mmHg).  ·  Mild dilation of the aortic root is present.           Phys Exam Other: Poor in situ dent.  Multiple broken/chipped/missing teeth.               Anesthesia Plan    ASA 3     general and general with block     (TAP blocks per surgeon request for post op pain control. Mr. Zeng denies CP/syncope/dyspnea. )  intravenous induction     Anesthetic plan, risks, benefits, and alternatives have been provided, discussed and informed consent has been obtained with: patient.  Pre-procedure education provided  Plan discussed with CRNA.    CODE STATUS:    Code Status (Patient has no pulse and is not breathing): CPR (Attempt to Resuscitate)  Medical Interventions (Patient has pulse or is breathing): Full Support

## 2025-07-30 NOTE — ANESTHESIA PROCEDURE NOTES
"Peripheral Block      Patient reassessed immediately prior to procedure    Patient location during procedure: OR  Start time: 7/30/2025 10:26 AM  Stop time: 7/30/2025 10:29 AM  Reason for block: at surgeon's request and post-op pain management  Performed by  CRNA/CAA: Gary Vazquez, ANUP  Assisted by: Maia Black CRNA  Preanesthetic Checklist  Completed: patient identified, IV checked, site marked, risks and benefits discussed, surgical consent, monitors and equipment checked, pre-op evaluation and timeout performed  Prep:  Pt Position: supine  Sterile barriers:cap, gloves, sterile barriers and mask  Prep: ChloraPrep  Patient monitoring: blood pressure monitoring, continuous pulse oximetry and EKG  Procedure    Sedation: yes (Sedation, GA, Spinal,Epidural )  Performed under: general  Guidance:ultrasound guided    ULTRASOUND INTERPRETATION.  Using ultrasound guidance a 20 G (20g 4\" Stimuplex) gauge needle was placed in close proximity to the nerve, at which point, under ultrasound guidance anesthetic was injected in the area of the nerve and spread of the anesthesia was seen on ultrasound in close proximity thereto.  There were no abnormalities seen on ultrasound; a digital image was taken; and the patient tolerated the procedure with no complications. Images:still images obtained, printed/placed on chart    Laterality:Bilateral  Block Type:TAP  Injection Technique:single-shot  Needle Type:short-bevel  Needle Gauge:20 G  Resistance on Injection: none    Medications Used: dexamethasone (DECADRON) injection - Injection, Transversus Abdominus Plane   8 mg - 7/30/2025 10:29:00 AM  buprenorphine (BUPRENEX) injection - Injection, Transversus Abdominus Plane   0.3 mg - 7/30/2025 10:29:00 AM  ropivacaine (NAROPIN) injection 0.5 % - Peripheral Nerve, Transversus Abdominus Plane   250 mg - 7/30/2025 10:29:00 AM      Medications  Preservative Free Saline:8ml  Comment:Block Injection:  Total volume divided " equally between Right and Left block        Post Assessment  Injection Assessment: negative aspiration for heme, incremental injection and no paresthesia on injection  Patient Tolerance:comfortable throughout block  Complications:no  Additional Notes  The pt was in the supine position under general anesthesia.    Under Ultrasound guidance, a BBraun 4inch 360 degree needle was advanced with Normal Saline hydro dissection of tissue.  The Internal Oblique and Transversus Abdominus muscles where visualized.  At or before the aponeurosis of Internal Oblique, local anesthetic spread was visualized in the Transversus Abdominus Plane. Injection was made incrementally with aspiration every 5 mls.  There was no  intravascular injection,  injection pressure was normal, there was no neural injection, and the procedure was completed without difficulty. The same procedure was completed for left and right sided tap blocks. Thank You.    Performed by: Maia Black CRNA

## 2025-07-31 ENCOUNTER — READMISSION MANAGEMENT (OUTPATIENT)
Dept: CALL CENTER | Facility: HOSPITAL | Age: 80
End: 2025-07-31
Payer: MEDICARE

## 2025-07-31 VITALS
HEIGHT: 69 IN | HEART RATE: 73 BPM | DIASTOLIC BLOOD PRESSURE: 79 MMHG | WEIGHT: 209.22 LBS | RESPIRATION RATE: 16 BRPM | OXYGEN SATURATION: 92 % | TEMPERATURE: 97.8 F | BODY MASS INDEX: 30.99 KG/M2 | SYSTOLIC BLOOD PRESSURE: 115 MMHG

## 2025-07-31 LAB
ANION GAP SERPL CALCULATED.3IONS-SCNC: 11.4 MMOL/L (ref 5–15)
BUN SERPL-MCNC: 17.2 MG/DL (ref 8–23)
BUN/CREAT SERPL: 15 (ref 7–25)
CALCIUM SPEC-SCNC: 8.3 MG/DL (ref 8.6–10.5)
CHLORIDE SERPL-SCNC: 101 MMOL/L (ref 98–107)
CO2 SERPL-SCNC: 20.6 MMOL/L (ref 22–29)
CREAT SERPL-MCNC: 1.15 MG/DL (ref 0.76–1.27)
EGFRCR SERPLBLD CKD-EPI 2021: 64.3 ML/MIN/1.73
GLUCOSE SERPL-MCNC: 156 MG/DL (ref 65–99)
POTASSIUM SERPL-SCNC: 4.6 MMOL/L (ref 3.5–5.2)
QT INTERVAL: 428 MS
QTC INTERVAL: 458 MS
SODIUM SERPL-SCNC: 133 MMOL/L (ref 136–145)

## 2025-07-31 PROCEDURE — 80048 BASIC METABOLIC PNL TOTAL CA: CPT | Performed by: HOSPITALIST

## 2025-07-31 PROCEDURE — 99239 HOSP IP/OBS DSCHRG MGMT >30: CPT

## 2025-07-31 PROCEDURE — 93005 ELECTROCARDIOGRAM TRACING: CPT

## 2025-07-31 RX ORDER — PANTOPRAZOLE SODIUM 40 MG/1
40 TABLET, DELAYED RELEASE ORAL DAILY
Qty: 30 TABLET | Refills: 0 | Status: SHIPPED | OUTPATIENT
Start: 2025-07-31

## 2025-07-31 RX ORDER — DILTIAZEM HYDROCHLORIDE 240 MG/1
240 CAPSULE, COATED, EXTENDED RELEASE ORAL
Qty: 30 CAPSULE | Refills: 0 | Status: SHIPPED | OUTPATIENT
Start: 2025-08-01

## 2025-07-31 RX ORDER — METOPROLOL TARTRATE 25 MG/1
25 TABLET, FILM COATED ORAL EVERY 12 HOURS SCHEDULED
Qty: 60 TABLET | Refills: 0 | Status: SHIPPED | OUTPATIENT
Start: 2025-07-31

## 2025-07-31 RX ORDER — FINASTERIDE 5 MG/1
5 TABLET, FILM COATED ORAL DAILY
Qty: 30 TABLET | Refills: 0 | Status: SHIPPED | OUTPATIENT
Start: 2025-08-01

## 2025-07-31 RX ADMIN — FINASTERIDE 5 MG: 5 TABLET, FILM COATED ORAL at 08:24

## 2025-07-31 RX ADMIN — DILTIAZEM HYDROCHLORIDE 240 MG: 120 CAPSULE, EXTENDED RELEASE ORAL at 08:23

## 2025-07-31 RX ADMIN — PANTOPRAZOLE SODIUM 40 MG: 40 INJECTION, POWDER, FOR SOLUTION INTRAVENOUS at 06:41

## 2025-07-31 RX ADMIN — Medication 10 ML: at 08:24

## 2025-07-31 RX ADMIN — APIXABAN 5 MG: 5 TABLET, FILM COATED ORAL at 12:01

## 2025-07-31 RX ADMIN — METOPROLOL TARTRATE 25 MG: 25 TABLET, FILM COATED ORAL at 08:22

## 2025-07-31 NOTE — PLAN OF CARE
Problem: Adult Inpatient Plan of Care  Goal: Plan of Care Review  Outcome: Adequate for Care Transition  Goal: Patient-Specific Goal (Individualized)  Outcome: Adequate for Care Transition  Goal: Absence of Hospital-Acquired Illness or Injury  Outcome: Adequate for Care Transition  Intervention: Identify and Manage Fall Risk  Recent Flowsheet Documentation  Taken 7/31/2025 1300 by Heather King RN  Safety Promotion/Fall Prevention: safety round/check completed  Taken 7/31/2025 1100 by Heather King RN  Safety Promotion/Fall Prevention: safety round/check completed  Taken 7/31/2025 0900 by Heather King RN  Safety Promotion/Fall Prevention: safety round/check completed  Taken 7/31/2025 0700 by Heather King RN  Safety Promotion/Fall Prevention: safety round/check completed  Intervention: Prevent and Manage VTE (Venous Thromboembolism) Risk  Recent Flowsheet Documentation  Taken 7/31/2025 0820 by Heather King RN  VTE Prevention/Management:   bilateral   SCDs (sequential compression devices) off   patient refused intervention  Intervention: Prevent Infection  Recent Flowsheet Documentation  Taken 7/31/2025 1300 by Heather King RN  Infection Prevention: rest/sleep promoted  Taken 7/31/2025 1100 by Heather King RN  Infection Prevention:   single patient room provided   rest/sleep promoted  Taken 7/31/2025 0900 by Heather King RN  Infection Prevention: rest/sleep promoted  Taken 7/31/2025 0700 by Heather King RN  Infection Prevention: rest/sleep promoted  Goal: Optimal Comfort and Wellbeing  Outcome: Adequate for Care Transition  Intervention: Provide Person-Centered Care  Recent Flowsheet Documentation  Taken 7/31/2025 0820 by Heather King RN  Trust Relationship/Rapport:   care explained   choices provided   emotional support provided   empathic listening provided   questions answered   questions encouraged   reassurance provided   thoughts/feelings acknowledged  Goal: Readiness for Transition of  Care  Outcome: Adequate for Care Transition     Problem: Fall Injury Risk  Goal: Absence of Fall and Fall-Related Injury  Outcome: Adequate for Care Transition  Intervention: Identify and Manage Contributors  Recent Flowsheet Documentation  Taken 7/31/2025 1300 by Heather King RN  Medication Review/Management: medications reviewed  Taken 7/31/2025 1100 by Heather King RN  Medication Review/Management: medications reviewed  Taken 7/31/2025 0900 by Heather King RN  Medication Review/Management: medications reviewed  Taken 7/31/2025 0700 by Heather King RN  Medication Review/Management: medications reviewed  Intervention: Promote Injury-Free Environment  Recent Flowsheet Documentation  Taken 7/31/2025 1300 by Heather King RN  Safety Promotion/Fall Prevention: safety round/check completed  Taken 7/31/2025 1100 by Heather King RN  Safety Promotion/Fall Prevention: safety round/check completed  Taken 7/31/2025 0900 by Heather King RN  Safety Promotion/Fall Prevention: safety round/check completed  Taken 7/31/2025 0700 by Heather King RN  Safety Promotion/Fall Prevention: safety round/check completed   Goal Outcome Evaluation:

## 2025-07-31 NOTE — PLAN OF CARE
Goal Outcome Evaluation:              Outcome Evaluation: Pt A&O. VSS on 2L NC. A flutter. Pt states no complaints or concerns at this time. Safety measures maintained.

## 2025-07-31 NOTE — DISCHARGE SUMMARY
[facility] DISCHARGE SUMMARY    Patient Identification:  Name:  Crow Zeng  Age:  79 y.o.  Sex:  male  :  1945  MRN:  [id number]  Visit Number:  [id number]    Date of Admission: 2025  Date of Discharge: 25      PCP: Ruby Choudhary APRN    DISCHARGE DIAGNOSES  New onset atrial fibrillation, cholelithiasis, status post cholecystectomy    CONSULTS   Surgery    PROCEDURES PERFORMED  Cholecystectomy    HOSPITAL COURSE    79-year-old male with past medical history significant for prostate cancer, BPH, hypertension presented to emergency department  due to constipation and rapid heart rate.     Patient was complaining of abdominal distention and pain in the emergency department patient previously saw his PCP prior to arrival and they recommended him to be evaluated by the emergency department.  Patient stated that he felt unwell beginning on Tuesday had some nausea vomiting on Wednesday and Thursday however felt better on the morning of admission he states he has been passing gas and had liquid bowel movements over the last several days denies any constipation.  On arrival to the emergency department heart rate is 162 noted to be in A-fib with RVR he was hypotensive 88/62 but subsequently improved once heart rate stabilized he received adenosine, diltiazem and amiodarone while in the emergency department and placed on amiodarone drip initial troponin was 20 2 repeat was 17 creatinine was 1.48 hemoglobin was 6.0 CT abdomen pelvis showed fluid-filled distended small bowel with largest area in the left upper lower quadrant caliber transition point within the right middle quadrants resembling early/partial small bowel obstruction.  Patient was admitted to the PCU.        Patient was transition off amiodarone drip to Cardizem and metoprolol.  Patient has been tolerating diet well.     : Continuing current rate control medications, patient is in sinus rhythm heart  rate 66, blood pressure stable.  Patient still has not had bowel movement however is passing gas, patient is tolerating GI diet, low irritant.     7/29: Urinary rentention ON, patient had Guzmán catheter placed, seem to have trauma had hematuria after Guzmán placement, heparin was held, hemoglobin has been stable, patient in normal sinus rhythm, patient also had some leaking around Guzmán catheter, 10 cc more of saline was placed in Guzmán bulb, leaking has stopped.  On evaluation urine was clear and yellow at the top of the Guzmán catheter, and no bleeding noted around urethral meatus.  General surgery was consulted due to nausea and vomiting and inability to tolerate diet, concern for worsening of SBO however general surgery thought it was likely due to his gallstones, planning for cholecystectomy tomorrow.  They recommend continue to hold heparin at this time and maintaining patient n.p.o. until after surgery.     7/30: Patient seen and examined at bedside, patient saturating well on 6 L nasal cannula, patient is postop still mildly drowsy, able to answer all questions, patient states that he had bowel movement this a.m., feels well after surgery.  Patient denies any difficulty breathing at this time however will obtain chest x-ray due to small pleural effusion on previous imaging to ensure it is not progressed.  Patient family member agreeable.  Will discontinue Guzmán at this time and do voiding trial.  Bladder scans every 6 hours, straight cath if greater than 350 postvoid residual volume.  Nursing staff made aware.    7/31: Patient seen and examined at bedside, saturating well on room air, x-ray from yesterday was fairly unremarkable, patient states that he is feels better today than he has in the last several days.  Patient is status post cholecystectomy yesterday by general surgery.  Patient has no abdominal pain, hemoglobin remained stable, no acute concerns for internal bleeding at this time.  Will start  Eliquis today and continue on discharge, patient to follow-up with cardiology outpatient for new onset atrial fibrillation and general surgery as needed as well as PCP within the next week.  Was in atrial flutter rate controlled with a heart rate of 73 on last EKG this a.m.  Patient is asymptomatic, patient is adamant about going home today.    VITAL SIGNS:  Temp:  [97.5 °F (36.4 °C)-98.1 °F (36.7 °C)] 97.8 °F (36.6 °C)  Heart Rate:  [68-77] 73  Resp:  [10-23] 16  BP: ()/() 115/79  SpO2:  [89 %-97 %] 92 %  on  Flow (L/min) (Oxygen Therapy):  [1-6] 1;   Device (Oxygen Therapy): room air    Body mass index is 30.9 kg/m².  Wt Readings from Last 3 Encounters:   07/31/25 94.9 kg (209 lb 3.5 oz)   04/09/25 90.4 kg (199 lb 6.4 oz)   10/09/24 90.3 kg (199 lb)       PHYSICAL EXAM:  Physical Exam  Constitutional:       General: He is not in acute distress.  HENT:      Head: Normocephalic and atraumatic.      Right Ear: External ear normal.      Left Ear: External ear normal.      Nose: Nose normal.      Mouth/Throat:      Mouth: Mucous membranes are moist.      Pharynx: Oropharynx is clear.   Eyes:      Extraocular Movements: Extraocular movements intact.      Pupils: Pupils are equal, round, and reactive to light.   Cardiovascular:      Rate and Rhythm: Normal rate and regular rhythm.      Pulses: Normal pulses.      Heart sounds: Normal heart sounds.   Pulmonary:      Effort: Pulmonary effort is normal.      Breath sounds: Normal breath sounds.   Abdominal:      General: Bowel sounds are normal. There is no distension.      Palpations: Abdomen is soft.      Tenderness: There is abdominal tenderness.   Musculoskeletal:         General: No swelling.      Right lower leg: No edema.      Left lower leg: No edema.   Skin:     Coloration: Skin is not jaundiced.   Neurological:      Mental Status: He is alert and oriented to person, place, and time. Mental status is at baseline.   Psychiatric:         Mood and Affect:  Mood normal.          DISCHARGE DISPOSITION   Stable       Discharge Medications        New Medications        Instructions Start Date   apixaban 5 MG tablet tablet  Commonly known as: ELIQUIS   5 mg, Oral, Every 12 Hours Scheduled      finasteride 5 MG tablet  Commonly known as: PROSCAR   5 mg, Oral, Daily   Start Date: August 1, 2025     metoprolol tartrate 25 MG tablet  Commonly known as: LOPRESSOR   25 mg, Oral, Every 12 Hours Scheduled      pantoprazole 40 MG EC tablet  Commonly known as: Protonix   40 mg, Oral, Daily             Changes to Medications        Instructions Start Date   dilTIAZem  MG 24 hr capsule  Commonly known as: CARDIZEM CD  What changed:   medication strength  how much to take  when to take this   240 mg, Oral, Every 24 Hours Scheduled   Start Date: August 1, 2025            Continue These Medications        Instructions Start Date   losartan 100 MG tablet  Commonly known as: COZAAR   100 mg, Daily      ondansetron 4 MG tablet  Commonly known as: ZOFRAN   4 mg, Oral, Every 8 Hours PRN      tadalafil 5 MG tablet  Commonly known as: CIALIS   5 mg, Nightly                  Follow-up Information       Ruby Choudhary APRN .    Specialty: Nurse Practitioner  Contact information:  [address] [place] [id number] [phone]                              TEST  RESULTS PENDING AT DISCHARGE  Pending Labs       Order Current Status    TISSUE EXAM, [facility] ([facility]) In process             CODE STATUS  Code Status and Medical Interventions: CPR (Attempt to Resuscitate); Full Support   Ordered at: 07/25/25 1613     Code Status (Patient has no pulse and is not breathing):    CPR (Attempt to Resuscitate)     Medical Interventions (Patient has pulse or is breathing):    Full Support       The ASCVD Risk score (Talib TURCIOS, et al., 2019) failed to calculate for the following reasons:    The 2019 ASCVD risk score is only valid for ages 40 to 79     Surya Simmons MD  [facility]  Hospitalist  07/31/25  13:50 EDT    Please note that this discharge summary required more than 30 minutes to complete.

## 2025-07-31 NOTE — OUTREACH NOTE
Prep Survey      Flowsheet Row Responses   [facility] patient discharged from? [facility]   Is LACE score < 7 ? No   Eligibility Readm Mgmt   Discharge diagnosis Atrial fibrillation with RVR, CHOLECYSTECTOMY LAPAROSCOPIC   Does the patient have one of the following disease processes/diagnoses(primary or secondary)? General Surgery   Does the patient have Home health ordered? No   Is there a DME ordered? No   Prep survey completed? Yes            KACEY HUANG - Registered Nurse

## 2025-07-31 NOTE — PROGRESS NOTES
LOS: 6 days   Patient Care Team:  Ruby Choudhary APRN as PCP - General (Nurse Practitioner)    Post op day # 1, status post laparoscopic cholecystectomy    Subjective     Interval History:  Patient states he feels well. Nausea resolved. Flatus but no bowel movement. Eliquis restarted.     History taken from patient.      Objective     Vital Signs  Temp:  [97.5 °F (36.4 °C)-98.1 °F (36.7 °C)] 97.9 °F (36.6 °C)  Heart Rate:  [64-77] 73  Resp:  [10-23] 18  BP: ()/() 104/77    Physical Exam:  This is a well-developed well-nourished male in no acute distress  HEENT examination: Sclera are anicteric  Abdomen: Hypoactive bowel sounds.  Expected incisional tenderness  Skin/incisions: Incision sites were inspected and demonstrate no drainage or erythema     Results Review:    Results from last 7 days   Lab Units 07/25/25  1421 07/25/25  1235   CK TOTAL U/L 134  --    HSTROP T ng/L 17 22*     Results from last 7 days   Lab Units 07/30/25  0048 07/29/25  1154 07/29/25  0138 07/28/25  0312 07/27/25  0630 07/25/25 2315 07/25/25  1235   LACTATE mmol/L  --   --   --   --   --   --  1.4   WBC 10*3/mm3 12.67*  --  12.86* 8.98 8.84 9.13 9.64   HEMOGLOBIN g/dL 13.6 14.6 15.0 13.0 13.4 13.4 14.5   HEMATOCRIT % 41.8 44.2 47.5 40.5 40.0 43.3 44.8   PLATELETS 10*3/mm3 260  --  288 214 224 224 297   INR   --   --   --   --  1.11* 1.12* 1.05         Results from last 7 days   Lab Units 07/31/25  0429 07/30/25  0048 07/29/25  0138 07/28/25  0312 07/27/25  0630 07/25/25  2315 07/25/25  1235   SODIUM mmol/L 133* 138 136 137 138 137 136   POTASSIUM mmol/L 4.6 4.1 4.1 3.6 3.7 4.2 4.3   MAGNESIUM mg/dL  --   --   --  1.9  --  2.1 2.1   CHLORIDE mmol/L 101 106 101 105 106 108* 102   CO2 mmol/L 20.6* 22.2 20.9* 21.5* 21.2* 17.2* 20.2*   BUN mg/dL 17.2 10.7 9.9 7.6* 11.5 20.2 25.7*   CREATININE mg/dL 1.15 1.01 1.25 1.07 1.00 1.09 1.48*   CALCIUM mg/dL 8.3* 7.8* 8.8 7.7* 7.9* 7.7* 8.9   GLUCOSE mg/dL 156* 105* 136* 93 89 92 112*  "  ALBUMIN g/dL  --   --   --   --  3.2* 3.2* 4.2   BILIRUBIN mg/dL  --   --   --   --  0.3 0.3 0.9   ALK PHOS U/L  --   --   --   --  71 68 87   AST (SGOT) U/L  --   --   --   --  22 18 20   ALT (SGPT) U/L  --   --   --   --  8 8 10   Estimated Creatinine Clearance: 58.3 mL/min (by C-G formula based on SCr of 1.15 mg/dL).  No results found for: \"AMMONIA\"  Results from last 7 days   Lab Units 07/25/25  1421   CHOLESTEROL mg/dL 125   TRIGLYCERIDES mg/dL 102   HDL CHOL mg/dL 38*   LDL CHOL mg/dL 68     No results found for: \"BLOODCX\"  No results found for: \"URINECX\"  No results found for: \"WOUNDCX\"  No results found for: \"STOOLCX\"    Imaging:  Imaging Results (Last 24 Hours)       Procedure Component Value Units Date/Time    XR Chest 1 View [[id number]] Collected: 07/30/25 1402     Updated: 07/30/25 1404    Narrative:      XR CHEST 1 VW-     CLINICAL INDICATION: Shortness of breath new oxygen requirement;  I48.91-Unspecified atrial fibrillation; K56.690-Other partial intestinal  obstruction; K80.20-Calculus of gallbladder without cholecystitis  without obstruction        COMPARISON: 7/25/2025     TECHNIQUE: Single frontal view of the chest.     FINDINGS:     LUNGS: Lungs are adequately aerated.      HEART AND MEDIASTINUM: Heart and mediastinal contours are unremarkable        SKELETON: Bony and soft tissue structures are unremarkable.             Impression:      No radiographic evidence of acute cardiac or pulmonary disease.           This report was finalized on 7/30/2025 2:02 PM by Dr. Dante De La Paz MD.       [place] joe endo (surgery) [[id number]] Resulted: 07/30/25 1317     Updated: 07/30/25 1317    Narrative:      This procedure was auto-finalized with no dictation required.             Impression:  Stable course, status post laparoscopic cholecystectomy    Plan:  Surgically stable for discharge    Mirta Duggan MD  07/31/25  13:03 EDT      Please note that portions of this note were completed with a voice " recognition program.

## 2025-07-31 NOTE — DISCHARGE INSTR - APPOINTMENTS
PCP - Ruby Choudhary, APRN  August 8, 2025 at 10:15 am  [phone]    Surgery - Dr. Duggan  August 11, 2025 at 4 pm  [phone]    Cardiology - Nicole Sparks, APRN  August 18, 2025 at 9 am  [phone]

## 2025-08-01 LAB
QT INTERVAL: 330 MS
QTC INTERVAL: 363 MS
REF LAB TEST METHOD: NORMAL

## 2025-08-11 ENCOUNTER — OFFICE VISIT (OUTPATIENT)
Dept: SURGERY | Facility: CLINIC | Age: 80
End: 2025-08-11
Payer: MEDICARE

## 2025-08-11 VITALS
DIASTOLIC BLOOD PRESSURE: 76 MMHG | WEIGHT: 191 LBS | HEIGHT: 69 IN | HEART RATE: 82 BPM | SYSTOLIC BLOOD PRESSURE: 132 MMHG | BODY MASS INDEX: 28.29 KG/M2

## 2025-08-11 DIAGNOSIS — Z09 POSTOP CHECK: ICD-10-CM

## 2025-08-11 DIAGNOSIS — K80.20 GALLSTONES: Primary | ICD-10-CM

## 2025-08-11 PROCEDURE — 3078F DIAST BP <80 MM HG: CPT | Performed by: SURGERY

## 2025-08-11 PROCEDURE — 1160F RVW MEDS BY RX/DR IN RCRD: CPT | Performed by: SURGERY

## 2025-08-11 PROCEDURE — 99024 POSTOP FOLLOW-UP VISIT: CPT | Performed by: SURGERY

## 2025-08-11 PROCEDURE — 1159F MED LIST DOCD IN RCRD: CPT | Performed by: SURGERY

## 2025-08-11 PROCEDURE — 3075F SYST BP GE 130 - 139MM HG: CPT | Performed by: SURGERY

## 2025-08-12 ENCOUNTER — READMISSION MANAGEMENT (OUTPATIENT)
Dept: CALL CENTER | Facility: HOSPITAL | Age: 80
End: 2025-08-12
Payer: MEDICARE

## 2025-08-18 ENCOUNTER — OFFICE VISIT (OUTPATIENT)
Dept: CARDIOLOGY | Facility: CLINIC | Age: 80
End: 2025-08-18
Payer: MEDICARE

## 2025-08-18 VITALS
WEIGHT: 190.8 LBS | HEIGHT: 69 IN | HEART RATE: 96 BPM | DIASTOLIC BLOOD PRESSURE: 102 MMHG | SYSTOLIC BLOOD PRESSURE: 158 MMHG | BODY MASS INDEX: 28.26 KG/M2 | OXYGEN SATURATION: 98 %

## 2025-08-18 DIAGNOSIS — I48.19 PERSISTENT ATRIAL FIBRILLATION: Primary | ICD-10-CM

## 2025-08-18 DIAGNOSIS — Z79.01 CHRONIC ANTICOAGULATION: ICD-10-CM

## 2025-08-18 DIAGNOSIS — Z92.89 HISTORY OF RECENT HOSPITALIZATION: ICD-10-CM

## 2025-08-18 DIAGNOSIS — Z91.89 MULTIPLE RISK FACTORS FOR CORONARY ARTERY DISEASE: ICD-10-CM

## 2025-08-18 DIAGNOSIS — I10 ESSENTIAL HYPERTENSION: ICD-10-CM

## 2025-08-18 PROCEDURE — 93000 ELECTROCARDIOGRAM COMPLETE: CPT | Performed by: NURSE PRACTITIONER

## 2025-08-18 PROCEDURE — 99214 OFFICE O/P EST MOD 30 MIN: CPT | Performed by: NURSE PRACTITIONER

## 2025-08-18 PROCEDURE — 3077F SYST BP >= 140 MM HG: CPT | Performed by: NURSE PRACTITIONER

## 2025-08-18 PROCEDURE — 3080F DIAST BP >= 90 MM HG: CPT | Performed by: NURSE PRACTITIONER

## 2025-08-18 PROCEDURE — 1159F MED LIST DOCD IN RCRD: CPT | Performed by: NURSE PRACTITIONER

## 2025-08-18 PROCEDURE — 1160F RVW MEDS BY RX/DR IN RCRD: CPT | Performed by: NURSE PRACTITIONER

## 2025-08-18 RX ORDER — HYDROCHLOROTHIAZIDE 12.5 MG/1
12.5 CAPSULE ORAL DAILY
Qty: 90 CAPSULE | Refills: 0 | Status: SHIPPED | OUTPATIENT
Start: 2025-08-18

## 2025-08-18 RX ORDER — METOPROLOL TARTRATE 25 MG/1
25 TABLET, FILM COATED ORAL EVERY 12 HOURS SCHEDULED
Qty: 180 TABLET | Refills: 0 | Status: SHIPPED | OUTPATIENT
Start: 2025-08-18

## 2025-08-18 RX ORDER — DILTIAZEM HYDROCHLORIDE 240 MG/1
240 CAPSULE, COATED, EXTENDED RELEASE ORAL
Qty: 90 CAPSULE | Refills: 0 | Status: SHIPPED | OUTPATIENT
Start: 2025-08-18

## 2025-08-21 RX ORDER — PANTOPRAZOLE SODIUM 40 MG/1
40 TABLET, DELAYED RELEASE ORAL DAILY
Qty: 30 TABLET | Refills: 0 | Status: SHIPPED | OUTPATIENT
Start: 2025-08-21

## 2025-08-26 RX ORDER — FINASTERIDE 5 MG/1
5 TABLET, FILM COATED ORAL DAILY
Qty: 30 TABLET | Refills: 0 | Status: SHIPPED | OUTPATIENT
Start: 2025-08-26

## (undated) DEVICE — 40595 XL TRENDELENBURG POSITIONING KIT: Brand: 40595 XL TRENDELENBURG POSITIONING KIT

## (undated) DEVICE — DRSNG WND BORDR/ADHS NONADHR/GZ LF 2X2IN STRL

## (undated) DEVICE — TROCAR: Brand: KII FIOS FIRST ENTRY

## (undated) DEVICE — PK LAP GEN 70

## (undated) DEVICE — 2, DISPOSABLE SUCTION/IRRIGATOR WITH DISPOSABLE TIP: Brand: STRYKEFLOW

## (undated) DEVICE — TUBING, SUCTION, 1/4" X 20', STRAIGHT: Brand: MEDLINE INDUSTRIES, INC.

## (undated) DEVICE — MONOPOLAR METZENBAUM SCISSOR TIP, DISPOSABLE: Brand: MONOPOLAR METZENBAUM SCISSOR TIP, DISPOSABLE

## (undated) DEVICE — ENDOPOUCH RETRIEVER SPECIMEN RETRIEVAL BAGS: Brand: ENDOPOUCH RETRIEVER

## (undated) DEVICE — INSUFFLATION NEEDLE TO ESTABLISH PNEUMOPERITONEUM.: Brand: INSUFFLATION NEEDLE

## (undated) DEVICE — ENDOPATH XCEL BLADELESS TROCARS WITH STABILITY SLEEVES: Brand: ENDOPATH XCEL

## (undated) DEVICE — ELECTRD BLD EZ CLN MOD XLNG 2.75IN

## (undated) DEVICE — PENCL ES MEGADINE EZ/CLEAN BUTN W/HOLSTR 10FT

## (undated) DEVICE — UNDYED BRAIDED (POLYGLACTIN 910), SYNTHETIC ABSORBABLE SUTURE: Brand: COATED VICRYL

## (undated) DEVICE — GLV SURG PREMIERPRO MIC LTX PF SZ7 BRN

## (undated) DEVICE — TROCAR: Brand: KII SLEEVE

## (undated) DEVICE — SUT MNCRYL PLS ANTIB UD 4/0 PS2 18IN

## (undated) DEVICE — PATIENT RETURN ELECTRODE, SINGLE-USE, CONTACT QUALITY MONITORING, ADULT, WITH 9FT CORD, FOR PATIENTS WEIGING OVER 33LBS. (15KG): Brand: MEGADYNE